# Patient Record
Sex: FEMALE | Race: WHITE | NOT HISPANIC OR LATINO | Employment: UNEMPLOYED | URBAN - METROPOLITAN AREA
[De-identification: names, ages, dates, MRNs, and addresses within clinical notes are randomized per-mention and may not be internally consistent; named-entity substitution may affect disease eponyms.]

---

## 2019-01-01 ENCOUNTER — TELEPHONE (OUTPATIENT)
Dept: ONCOLOGY | Facility: CLINIC | Age: 64
End: 2019-01-01

## 2019-01-01 ENCOUNTER — EDUCATION (OUTPATIENT)
Dept: ONCOLOGY | Facility: HOSPITAL | Age: 64
End: 2019-01-01

## 2019-01-01 ENCOUNTER — OFFICE VISIT (OUTPATIENT)
Dept: ONCOLOGY | Facility: CLINIC | Age: 64
End: 2019-01-01

## 2019-01-01 ENCOUNTER — INFUSION (OUTPATIENT)
Dept: ONCOLOGY | Facility: HOSPITAL | Age: 64
End: 2019-01-01

## 2019-01-01 ENCOUNTER — HOSPITAL ENCOUNTER (OUTPATIENT)
Dept: ONCOLOGY | Facility: HOSPITAL | Age: 64
Discharge: HOME | End: 2019-11-14

## 2019-01-01 ENCOUNTER — SPECIALTY PHARMACY (OUTPATIENT)
Dept: ONCOLOGY | Facility: HOSPITAL | Age: 64
End: 2019-01-01

## 2019-01-01 ENCOUNTER — RESULTS ENCOUNTER (OUTPATIENT)
Dept: ONCOLOGY | Facility: CLINIC | Age: 64
End: 2019-01-01

## 2019-01-01 ENCOUNTER — HOSPITAL ENCOUNTER (OUTPATIENT)
Dept: CARDIOLOGY | Facility: HOSPITAL | Age: 64
Discharge: HOME | End: 2019-12-11
Admitting: NURSE PRACTITIONER

## 2019-01-01 ENCOUNTER — TELEPHONE (OUTPATIENT)
Dept: NUTRITION | Facility: HOSPITAL | Age: 64
End: 2019-01-01

## 2019-01-01 ENCOUNTER — HOSPITAL ENCOUNTER (OUTPATIENT)
Dept: ONCOLOGY | Facility: HOSPITAL | Age: 64
Discharge: HOME | End: 2019-11-01
Admitting: INTERNAL MEDICINE

## 2019-01-01 ENCOUNTER — SPECIALTY PHARMACY (OUTPATIENT)
Dept: ONCOLOGY | Facility: CLINIC | Age: 64
End: 2019-01-01

## 2019-01-01 ENCOUNTER — TELEPHONE (OUTPATIENT)
Dept: ONCOLOGY | Facility: HOSPITAL | Age: 64
End: 2019-01-01

## 2019-01-01 ENCOUNTER — HOSPITAL ENCOUNTER (OUTPATIENT)
Dept: CARDIOLOGY | Facility: HOSPITAL | Age: 64
Discharge: HOME | End: 2019-11-01

## 2019-01-01 ENCOUNTER — HOSPITAL ENCOUNTER (OUTPATIENT)
Dept: ONCOLOGY | Facility: HOSPITAL | Age: 64
Setting detail: INFUSION SERIES
Discharge: HOME | End: 2019-11-14

## 2019-01-01 ENCOUNTER — APPOINTMENT (OUTPATIENT)
Dept: ONCOLOGY | Facility: HOSPITAL | Age: 64
End: 2019-01-01

## 2019-01-01 ENCOUNTER — LAB (OUTPATIENT)
Dept: ONCOLOGY | Facility: HOSPITAL | Age: 64
End: 2019-01-01

## 2019-01-01 ENCOUNTER — TELEPHONE (OUTPATIENT)
Dept: SOCIAL WORK | Facility: HOSPITAL | Age: 64
End: 2019-01-01

## 2019-01-01 ENCOUNTER — DOCUMENTATION (OUTPATIENT)
Dept: NUTRITION | Facility: HOSPITAL | Age: 64
End: 2019-01-01

## 2019-01-01 VITALS
SYSTOLIC BLOOD PRESSURE: 130 MMHG | BODY MASS INDEX: 23.23 KG/M2 | RESPIRATION RATE: 16 BRPM | DIASTOLIC BLOOD PRESSURE: 68 MMHG | TEMPERATURE: 98 F | HEART RATE: 90 BPM | WEIGHT: 145 LBS

## 2019-01-01 VITALS
TEMPERATURE: 98.6 F | WEIGHT: 148 LBS | SYSTOLIC BLOOD PRESSURE: 126 MMHG | DIASTOLIC BLOOD PRESSURE: 62 MMHG | HEIGHT: 66 IN | RESPIRATION RATE: 16 BRPM | HEART RATE: 74 BPM | BODY MASS INDEX: 23.78 KG/M2

## 2019-01-01 VITALS
BODY MASS INDEX: 22.93 KG/M2 | TEMPERATURE: 97.2 F | HEART RATE: 89 BPM | WEIGHT: 143.2 LBS | SYSTOLIC BLOOD PRESSURE: 138 MMHG | DIASTOLIC BLOOD PRESSURE: 89 MMHG | RESPIRATION RATE: 18 BRPM

## 2019-01-01 VITALS — WEIGHT: 143.08 LBS | BODY MASS INDEX: 22.99 KG/M2 | HEIGHT: 66 IN

## 2019-01-01 VITALS
BODY MASS INDEX: 23.24 KG/M2 | RESPIRATION RATE: 16 BRPM | HEART RATE: 103 BPM | SYSTOLIC BLOOD PRESSURE: 115 MMHG | TEMPERATURE: 97.7 F | DIASTOLIC BLOOD PRESSURE: 74 MMHG | WEIGHT: 144 LBS

## 2019-01-01 VITALS
WEIGHT: 143 LBS | HEIGHT: 66 IN | DIASTOLIC BLOOD PRESSURE: 80 MMHG | RESPIRATION RATE: 20 BRPM | BODY MASS INDEX: 22.98 KG/M2 | TEMPERATURE: 98.6 F | SYSTOLIC BLOOD PRESSURE: 130 MMHG | HEART RATE: 80 BPM

## 2019-01-01 VITALS
BODY MASS INDEX: 22.9 KG/M2 | SYSTOLIC BLOOD PRESSURE: 125 MMHG | HEART RATE: 85 BPM | TEMPERATURE: 98.3 F | DIASTOLIC BLOOD PRESSURE: 75 MMHG | RESPIRATION RATE: 18 BRPM | WEIGHT: 143 LBS

## 2019-01-01 VITALS
SYSTOLIC BLOOD PRESSURE: 138 MMHG | TEMPERATURE: 98 F | HEART RATE: 84 BPM | BODY MASS INDEX: 23.48 KG/M2 | WEIGHT: 146.6 LBS | DIASTOLIC BLOOD PRESSURE: 77 MMHG

## 2019-01-01 VITALS
DIASTOLIC BLOOD PRESSURE: 59 MMHG | HEART RATE: 80 BPM | BODY MASS INDEX: 23.3 KG/M2 | WEIGHT: 145 LBS | SYSTOLIC BLOOD PRESSURE: 130 MMHG | HEIGHT: 66 IN | RESPIRATION RATE: 18 BRPM | TEMPERATURE: 98.6 F

## 2019-01-01 VITALS
TEMPERATURE: 98.2 F | BODY MASS INDEX: 23.24 KG/M2 | DIASTOLIC BLOOD PRESSURE: 68 MMHG | RESPIRATION RATE: 18 BRPM | SYSTOLIC BLOOD PRESSURE: 131 MMHG | WEIGHT: 144 LBS | HEART RATE: 88 BPM

## 2019-01-01 VITALS
WEIGHT: 143.2 LBS | TEMPERATURE: 97.9 F | SYSTOLIC BLOOD PRESSURE: 130 MMHG | HEART RATE: 100 BPM | DIASTOLIC BLOOD PRESSURE: 66 MMHG | RESPIRATION RATE: 16 BRPM | BODY MASS INDEX: 23.11 KG/M2

## 2019-01-01 VITALS
SYSTOLIC BLOOD PRESSURE: 134 MMHG | RESPIRATION RATE: 16 BRPM | BODY MASS INDEX: 23.22 KG/M2 | HEART RATE: 103 BPM | DIASTOLIC BLOOD PRESSURE: 78 MMHG | WEIGHT: 145 LBS | TEMPERATURE: 98.8 F

## 2019-01-01 VITALS
RESPIRATION RATE: 16 BRPM | DIASTOLIC BLOOD PRESSURE: 75 MMHG | WEIGHT: 145.4 LBS | TEMPERATURE: 98.4 F | HEART RATE: 79 BPM | SYSTOLIC BLOOD PRESSURE: 141 MMHG | BODY MASS INDEX: 23.47 KG/M2

## 2019-01-01 VITALS
WEIGHT: 142.6 LBS | RESPIRATION RATE: 16 BRPM | TEMPERATURE: 98.1 F | HEART RATE: 102 BPM | DIASTOLIC BLOOD PRESSURE: 64 MMHG | BODY MASS INDEX: 23.02 KG/M2 | SYSTOLIC BLOOD PRESSURE: 123 MMHG

## 2019-01-01 VITALS
SYSTOLIC BLOOD PRESSURE: 173 MMHG | RESPIRATION RATE: 18 BRPM | HEART RATE: 83 BPM | DIASTOLIC BLOOD PRESSURE: 84 MMHG | BODY MASS INDEX: 22.66 KG/M2 | WEIGHT: 141 LBS | HEIGHT: 66 IN | TEMPERATURE: 98.9 F

## 2019-01-01 VITALS
WEIGHT: 149.4 LBS | TEMPERATURE: 97.7 F | DIASTOLIC BLOOD PRESSURE: 79 MMHG | SYSTOLIC BLOOD PRESSURE: 136 MMHG | WEIGHT: 146.8 LBS | BODY MASS INDEX: 23.93 KG/M2 | TEMPERATURE: 97.9 F | RESPIRATION RATE: 18 BRPM | DIASTOLIC BLOOD PRESSURE: 66 MMHG | SYSTOLIC BLOOD PRESSURE: 146 MMHG | RESPIRATION RATE: 18 BRPM | BODY MASS INDEX: 23.52 KG/M2 | HEART RATE: 72 BPM | HEART RATE: 83 BPM

## 2019-01-01 VITALS
WEIGHT: 143.96 LBS | HEART RATE: 82 BPM | TEMPERATURE: 98 F | SYSTOLIC BLOOD PRESSURE: 121 MMHG | DIASTOLIC BLOOD PRESSURE: 56 MMHG | BODY MASS INDEX: 23.05 KG/M2 | RESPIRATION RATE: 18 BRPM

## 2019-01-01 VITALS
DIASTOLIC BLOOD PRESSURE: 76 MMHG | HEIGHT: 66 IN | WEIGHT: 147 LBS | RESPIRATION RATE: 18 BRPM | BODY MASS INDEX: 23.63 KG/M2 | SYSTOLIC BLOOD PRESSURE: 139 MMHG | HEART RATE: 92 BPM | TEMPERATURE: 98.5 F

## 2019-01-01 VITALS
RESPIRATION RATE: 18 BRPM | HEIGHT: 66 IN | DIASTOLIC BLOOD PRESSURE: 80 MMHG | WEIGHT: 148 LBS | BODY MASS INDEX: 23.78 KG/M2 | SYSTOLIC BLOOD PRESSURE: 178 MMHG | TEMPERATURE: 98.8 F | HEART RATE: 126 BPM

## 2019-01-01 VITALS
HEIGHT: 66 IN | SYSTOLIC BLOOD PRESSURE: 120 MMHG | HEART RATE: 80 BPM | OXYGEN SATURATION: 97 % | BODY MASS INDEX: 23.95 KG/M2 | TEMPERATURE: 99.2 F | RESPIRATION RATE: 16 BRPM | DIASTOLIC BLOOD PRESSURE: 74 MMHG | WEIGHT: 149 LBS

## 2019-01-01 VITALS
HEART RATE: 94 BPM | TEMPERATURE: 98 F | RESPIRATION RATE: 16 BRPM | DIASTOLIC BLOOD PRESSURE: 82 MMHG | BODY MASS INDEX: 23.55 KG/M2 | SYSTOLIC BLOOD PRESSURE: 145 MMHG | WEIGHT: 147 LBS

## 2019-01-01 VITALS
DIASTOLIC BLOOD PRESSURE: 66 MMHG | WEIGHT: 147 LBS | RESPIRATION RATE: 18 BRPM | TEMPERATURE: 97.9 F | HEIGHT: 66 IN | BODY MASS INDEX: 23.63 KG/M2 | HEART RATE: 73 BPM | SYSTOLIC BLOOD PRESSURE: 127 MMHG

## 2019-01-01 VITALS
SYSTOLIC BLOOD PRESSURE: 110 MMHG | RESPIRATION RATE: 18 BRPM | HEART RATE: 112 BPM | BODY MASS INDEX: 23.22 KG/M2 | WEIGHT: 145 LBS | TEMPERATURE: 98.8 F | DIASTOLIC BLOOD PRESSURE: 65 MMHG

## 2019-01-01 VITALS
WEIGHT: 144 LBS | SYSTOLIC BLOOD PRESSURE: 138 MMHG | TEMPERATURE: 98.1 F | HEART RATE: 85 BPM | DIASTOLIC BLOOD PRESSURE: 79 MMHG | HEIGHT: 66 IN | RESPIRATION RATE: 18 BRPM | BODY MASS INDEX: 23.14 KG/M2

## 2019-01-01 VITALS
RESPIRATION RATE: 18 BRPM | HEART RATE: 84 BPM | BODY MASS INDEX: 23.16 KG/M2 | DIASTOLIC BLOOD PRESSURE: 88 MMHG | WEIGHT: 144.6 LBS | SYSTOLIC BLOOD PRESSURE: 156 MMHG | TEMPERATURE: 98.3 F

## 2019-01-01 VITALS — BODY MASS INDEX: 23.3 KG/M2 | HEIGHT: 66 IN | WEIGHT: 145 LBS

## 2019-01-01 VITALS
HEART RATE: 80 BPM | BODY MASS INDEX: 23.15 KG/M2 | DIASTOLIC BLOOD PRESSURE: 46 MMHG | RESPIRATION RATE: 18 BRPM | WEIGHT: 143.4 LBS | SYSTOLIC BLOOD PRESSURE: 130 MMHG | TEMPERATURE: 98.9 F

## 2019-01-01 DIAGNOSIS — C18.8 OVERLAPPING MALIGNANT NEOPLASM OF COLON (HCC): Primary | ICD-10-CM

## 2019-01-01 DIAGNOSIS — C18.8 OVERLAPPING MALIGNANT NEOPLASM OF COLON (HCC): ICD-10-CM

## 2019-01-01 DIAGNOSIS — Z45.2 ENCOUNTER FOR VENOUS ACCESS DEVICE CARE: ICD-10-CM

## 2019-01-01 DIAGNOSIS — C18.8 OVERLAPPING MALIGNANT NEOPLASM OF COLON (HCC): Primary | Chronic | ICD-10-CM

## 2019-01-01 DIAGNOSIS — C18.8 OVERLAPPING MALIGNANT NEOPLASM OF COLON (HCC): Chronic | ICD-10-CM

## 2019-01-01 DIAGNOSIS — Z51.11 ENCOUNTER FOR ANTINEOPLASTIC CHEMOTHERAPY: Primary | ICD-10-CM

## 2019-01-01 DIAGNOSIS — Z45.2 ENCOUNTER FOR VENOUS ACCESS DEVICE CARE: Primary | ICD-10-CM

## 2019-01-01 DIAGNOSIS — Z51.11 ENCOUNTER FOR ANTINEOPLASTIC CHEMOTHERAPY: ICD-10-CM

## 2019-01-01 DIAGNOSIS — C78.6 PERITONEAL CARCINOMATOSIS (HCC): ICD-10-CM

## 2019-01-01 DIAGNOSIS — C78.6 PERITONEAL CARCINOMATOSIS (HCC): Primary | ICD-10-CM

## 2019-01-01 LAB
ALBUMIN SERPL-MCNC: 3.6 G/DL (ref 3.6–4.8)
ALBUMIN SERPL-MCNC: 3.6 G/DL (ref 3.6–4.8)
ALBUMIN SERPL-MCNC: 3.7 G/DL (ref 3.6–4.8)
ALBUMIN SERPL-MCNC: 3.7 G/DL (ref 3.6–4.8)
ALBUMIN SERPL-MCNC: 3.8 G/DL (ref 3.6–4.8)
ALBUMIN SERPL-MCNC: 3.9 G/DL (ref 3.6–4.8)
ALBUMIN SERPL-MCNC: 4 G/DL (ref 3.5–5.2)
ALBUMIN SERPL-MCNC: 4 G/DL (ref 3.6–4.8)
ALBUMIN SERPL-MCNC: 4.1 G/DL (ref 3.5–5.2)
ALBUMIN SERPL-MCNC: 4.1 G/DL (ref 3.6–4.8)
ALBUMIN SERPL-MCNC: 4.1 G/DL (ref 3.6–4.8)
ALBUMIN SERPL-MCNC: NORMAL G/DL
ALBUMIN/GLOB SERPL: 1.3 G/DL
ALBUMIN/GLOB SERPL: 1.4 {RATIO} (ref 1.2–2.2)
ALBUMIN/GLOB SERPL: 1.5 {RATIO} (ref 1.2–2.2)
ALBUMIN/GLOB SERPL: 1.6 {RATIO} (ref 1.2–2.2)
ALBUMIN/GLOB SERPL: 1.6 {RATIO} (ref 1.2–2.2)
ALBUMIN/GLOB SERPL: 1.7 G/DL
ALBUMIN/GLOB SERPL: 1.7 {RATIO} (ref 1.2–2.2)
ALBUMIN/GLOB SERPL: 1.8 {RATIO} (ref 1.2–2.2)
ALBUMIN/GLOB SERPL: 2.1 {RATIO} (ref 1.2–2.2)
ALP SERPL-CCNC: 102 IU/L (ref 39–117)
ALP SERPL-CCNC: 105 IU/L (ref 39–117)
ALP SERPL-CCNC: 108 IU/L (ref 39–117)
ALP SERPL-CCNC: 116 IU/L (ref 39–117)
ALP SERPL-CCNC: 116 IU/L (ref 39–117)
ALP SERPL-CCNC: 121 IU/L (ref 39–117)
ALP SERPL-CCNC: 123 IU/L (ref 39–117)
ALP SERPL-CCNC: 145 U/L (ref 39–117)
ALP SERPL-CCNC: 148 IU/L (ref 39–117)
ALP SERPL-CCNC: 153 U/L (ref 39–117)
ALP SERPL-CCNC: 98 IU/L (ref 39–117)
ALP SERPL-CCNC: NORMAL U/L
ALT SERPL W P-5'-P-CCNC: 40 U/L (ref 1–33)
ALT SERPL-CCNC: 17 IU/L (ref 0–32)
ALT SERPL-CCNC: 20 IU/L (ref 0–32)
ALT SERPL-CCNC: 20 IU/L (ref 0–32)
ALT SERPL-CCNC: 27 IU/L (ref 0–32)
ALT SERPL-CCNC: 27 IU/L (ref 0–32)
ALT SERPL-CCNC: 28 IU/L (ref 0–32)
ALT SERPL-CCNC: 31 IU/L (ref 0–32)
ALT SERPL-CCNC: 36 IU/L (ref 0–32)
ALT SERPL-CCNC: 44 IU/L (ref 0–32)
ALT SERPL-CCNC: 47 U/L (ref 1–33)
ALT SERPL-CCNC: NORMAL U/L
ANION GAP SERPL CALCULATED.3IONS-SCNC: 11 MMOL/L (ref 5–15)
APPEARANCE UR: ABNORMAL
APPEARANCE UR: CLEAR
ASCENDING AORTA: 2.9 CM
AST SERPL-CCNC: 17 IU/L (ref 0–40)
AST SERPL-CCNC: 20 IU/L (ref 0–40)
AST SERPL-CCNC: 21 IU/L (ref 0–40)
AST SERPL-CCNC: 25 IU/L (ref 0–40)
AST SERPL-CCNC: 26 IU/L (ref 0–40)
AST SERPL-CCNC: 29 U/L (ref 1–32)
AST SERPL-CCNC: 31 IU/L (ref 0–40)
AST SERPL-CCNC: 33 IU/L (ref 0–40)
AST SERPL-CCNC: 35 U/L (ref 1–32)
AST SERPL-CCNC: NORMAL U/L
BASOPHILS # BLD AUTO: 0 X10E3/UL (ref 0–0.2)
BASOPHILS # BLD AUTO: 0.05 10*3/MM3 (ref 0–0.2)
BASOPHILS # BLD AUTO: 0.1 X10E3/UL (ref 0–0.2)
BASOPHILS # BLD AUTO: 0.1 X10E3/UL (ref 0–0.2)
BASOPHILS NFR BLD AUTO: 0 %
BASOPHILS NFR BLD AUTO: 0 %
BASOPHILS NFR BLD AUTO: 0.8 % (ref 0–1.5)
BASOPHILS NFR BLD AUTO: 1 %
BH CV ECHO MEAS - AO MAX PG (FULL): 1.5 MMHG
BH CV ECHO MEAS - AO MAX PG: 4.9 MMHG
BH CV ECHO MEAS - AO MEAN PG (FULL): 0.95 MMHG
BH CV ECHO MEAS - AO MEAN PG: 2.6 MMHG
BH CV ECHO MEAS - AO ROOT AREA (BSA CORRECTED): 1.6
BH CV ECHO MEAS - AO ROOT AREA: 6.1 CM^2
BH CV ECHO MEAS - AO ROOT DIAM: 2.8 CM
BH CV ECHO MEAS - AO V2 MAX: 110.8 CM/SEC
BH CV ECHO MEAS - AO V2 MEAN: 76.4 CM/SEC
BH CV ECHO MEAS - AO V2 VTI: 22.9 CM
BH CV ECHO MEAS - ASC AORTA: 2.9 CM
BH CV ECHO MEAS - AVA(I,A): 2.5 CM^2
BH CV ECHO MEAS - AVA(I,D): 2.5 CM^2
BH CV ECHO MEAS - AVA(V,A): 2.5 CM^2
BH CV ECHO MEAS - AVA(V,D): 2.5 CM^2
BH CV ECHO MEAS - BSA(HAYCOCK): 1.7 M^2
BH CV ECHO MEAS - BSA(HAYCOCK): 1.8 M^2
BH CV ECHO MEAS - BSA: 1.7 M^2
BH CV ECHO MEAS - BSA: 1.7 M^2
BH CV ECHO MEAS - BZI_BMI: 23.1 KILOGRAMS/M^2
BH CV ECHO MEAS - BZI_BMI: 23.4 KILOGRAMS/M^2
BH CV ECHO MEAS - BZI_METRIC_HEIGHT: 167.6 CM
BH CV ECHO MEAS - BZI_METRIC_HEIGHT: 167.6 CM
BH CV ECHO MEAS - BZI_METRIC_WEIGHT: 64.9 KG
BH CV ECHO MEAS - BZI_METRIC_WEIGHT: 65.8 KG
BH CV ECHO MEAS - EDV(CUBED): 103.2 ML
BH CV ECHO MEAS - EDV(CUBED): 139 ML
BH CV ECHO MEAS - EDV(MOD-SP2): 46 ML
BH CV ECHO MEAS - EDV(MOD-SP2): 70 ML
BH CV ECHO MEAS - EDV(MOD-SP4): 110 ML
BH CV ECHO MEAS - EDV(MOD-SP4): 79 ML
BH CV ECHO MEAS - EDV(TEICH): 101.9 ML
BH CV ECHO MEAS - EDV(TEICH): 128.3 ML
BH CV ECHO MEAS - EF(CUBED): 60.4 %
BH CV ECHO MEAS - EF(CUBED): 62.9 %
BH CV ECHO MEAS - EF(MOD-BP): 59 %
BH CV ECHO MEAS - EF(MOD-BP): 72 %
BH CV ECHO MEAS - EF(MOD-SP2): 55.7 %
BH CV ECHO MEAS - EF(MOD-SP2): 71.7 %
BH CV ECHO MEAS - EF(MOD-SP4): 60.9 %
BH CV ECHO MEAS - EF(MOD-SP4): 73.4 %
BH CV ECHO MEAS - EF(TEICH): 52 %
BH CV ECHO MEAS - EF(TEICH): 54 %
BH CV ECHO MEAS - ESV(CUBED): 40.9 ML
BH CV ECHO MEAS - ESV(CUBED): 51.6 ML
BH CV ECHO MEAS - ESV(MOD-SP2): 13 ML
BH CV ECHO MEAS - ESV(MOD-SP2): 31 ML
BH CV ECHO MEAS - ESV(MOD-SP4): 21 ML
BH CV ECHO MEAS - ESV(MOD-SP4): 43 ML
BH CV ECHO MEAS - ESV(TEICH): 48.9 ML
BH CV ECHO MEAS - ESV(TEICH): 59 ML
BH CV ECHO MEAS - FS: 26.6 %
BH CV ECHO MEAS - FS: 28.1 %
BH CV ECHO MEAS - IVS/LVPW: 0.99
BH CV ECHO MEAS - IVS/LVPW: 1.3
BH CV ECHO MEAS - IVSD: 0.72 CM
BH CV ECHO MEAS - IVSD: 0.89 CM
BH CV ECHO MEAS - LA DIMENSION: 3.1 CM
BH CV ECHO MEAS - LA/AO: 1.1
BH CV ECHO MEAS - LAD MAJOR: 4.3 CM
BH CV ECHO MEAS - LAT PEAK E' VEL: 9.5 CM/SEC
BH CV ECHO MEAS - LATERAL E/E' RATIO: 10.8
BH CV ECHO MEAS - LV DIASTOLIC VOL/BSA (35-75): 45.3 ML/M^2
BH CV ECHO MEAS - LV DIASTOLIC VOL/BSA (35-75): 63.4 ML/M^2
BH CV ECHO MEAS - LV MASS(C)D: 109.9 GRAMS
BH CV ECHO MEAS - LV MASS(C)D: 140.7 GRAMS
BH CV ECHO MEAS - LV MASS(C)DI: 63.3 GRAMS/M^2
BH CV ECHO MEAS - LV MASS(C)DI: 80.7 GRAMS/M^2
BH CV ECHO MEAS - LV MAX PG: 3.4 MMHG
BH CV ECHO MEAS - LV MEAN PG: 1.7 MMHG
BH CV ECHO MEAS - LV SYSTOLIC VOL/BSA (12-30): 12 ML/M^2
BH CV ECHO MEAS - LV SYSTOLIC VOL/BSA (12-30): 24.8 ML/M^2
BH CV ECHO MEAS - LV V1 MAX: 92.1 CM/SEC
BH CV ECHO MEAS - LV V1 MEAN: 59.9 CM/SEC
BH CV ECHO MEAS - LV V1 VTI: 18.6 CM
BH CV ECHO MEAS - LVIDD: 4.7 CM
BH CV ECHO MEAS - LVIDD: 5.2 CM
BH CV ECHO MEAS - LVIDS: 3.4 CM
BH CV ECHO MEAS - LVIDS: 3.7 CM
BH CV ECHO MEAS - LVLD AP2: 6.3 CM
BH CV ECHO MEAS - LVLD AP2: 6.8 CM
BH CV ECHO MEAS - LVLD AP4: 6.6 CM
BH CV ECHO MEAS - LVLD AP4: 7.6 CM
BH CV ECHO MEAS - LVLS AP2: 5.2 CM
BH CV ECHO MEAS - LVLS AP2: 5.5 CM
BH CV ECHO MEAS - LVLS AP4: 5.3 CM
BH CV ECHO MEAS - LVLS AP4: 5.8 CM
BH CV ECHO MEAS - LVOT AREA (M): 3.1 CM^2
BH CV ECHO MEAS - LVOT AREA: 3.1 CM^2
BH CV ECHO MEAS - LVOT DIAM: 2 CM
BH CV ECHO MEAS - LVPWD: 0.57 CM
BH CV ECHO MEAS - LVPWD: 0.9 CM
BH CV ECHO MEAS - MED PEAK E' VEL: 4.3 CM/SEC
BH CV ECHO MEAS - MEDIAL E/E' RATIO: 23.5
BH CV ECHO MEAS - MV A MAX VEL: 96 CM/SEC
BH CV ECHO MEAS - MV DEC TIME: 0.23 SEC
BH CV ECHO MEAS - MV E MAX VEL: 104.7 CM/SEC
BH CV ECHO MEAS - MV E/A: 1.1
BH CV ECHO MEAS - MV MAX PG: 4.9 MMHG
BH CV ECHO MEAS - MV MEAN PG: 2.1 MMHG
BH CV ECHO MEAS - MV V2 MAX: 111.2 CM/SEC
BH CV ECHO MEAS - MV V2 MEAN: 65.9 CM/SEC
BH CV ECHO MEAS - MV V2 VTI: 29.7 CM
BH CV ECHO MEAS - MVA(VTI): 1.9 CM^2
BH CV ECHO MEAS - PA ACC SLOPE: 570.7 CM/SEC^2
BH CV ECHO MEAS - PA ACC TIME: 0.15 SEC
BH CV ECHO MEAS - PA MAX PG: 3.5 MMHG
BH CV ECHO MEAS - PA MEAN PG: 2.1 MMHG
BH CV ECHO MEAS - PA PR(ACCEL): 12.5 MMHG
BH CV ECHO MEAS - PA V2 MAX: 93.1 CM/SEC
BH CV ECHO MEAS - PA V2 MEAN: 68.1 CM/SEC
BH CV ECHO MEAS - PA V2 VTI: 17.7 CM
BH CV ECHO MEAS - RAP SYSTOLE: 3 MMHG
BH CV ECHO MEAS - RVSP: 18 MMHG
BH CV ECHO MEAS - SI(AO): 80.5 ML/M^2
BH CV ECHO MEAS - SI(CUBED): 35.7 ML/M^2
BH CV ECHO MEAS - SI(CUBED): 50.4 ML/M^2
BH CV ECHO MEAS - SI(LVOT): 32.6 ML/M^2
BH CV ECHO MEAS - SI(MOD-SP2): 18.9 ML/M^2
BH CV ECHO MEAS - SI(MOD-SP2): 22.5 ML/M^2
BH CV ECHO MEAS - SI(MOD-SP4): 33.2 ML/M^2
BH CV ECHO MEAS - SI(MOD-SP4): 38.6 ML/M^2
BH CV ECHO MEAS - SI(TEICH): 30.4 ML/M^2
BH CV ECHO MEAS - SI(TEICH): 40 ML/M^2
BH CV ECHO MEAS - SV(AO): 139.7 ML
BH CV ECHO MEAS - SV(CUBED): 62.4 ML
BH CV ECHO MEAS - SV(CUBED): 87.4 ML
BH CV ECHO MEAS - SV(LVOT): 56.6 ML
BH CV ECHO MEAS - SV(MOD-SP2): 33 ML
BH CV ECHO MEAS - SV(MOD-SP2): 39 ML
BH CV ECHO MEAS - SV(MOD-SP4): 58 ML
BH CV ECHO MEAS - SV(MOD-SP4): 67 ML
BH CV ECHO MEAS - SV(TEICH): 53 ML
BH CV ECHO MEAS - SV(TEICH): 69.4 ML
BH CV ECHO MEAS - TAPSE (>1.6): 2.5 CM2
BH CV ECHO MEAS - TR MAX PG: 15 MMHG
BH CV ECHO MEAS - TR MAX VEL: 189.4 CM/SEC
BH CV ECHO MEASUREMENTS AVERAGE E/E' RATIO: 15.17
BH CV VAS BP RIGHT ARM: NORMAL MMHG
BH CV XLRA - RV BASE: 3.4 CM
BH CV XLRA - RV LENGTH: 6.9 CM
BH CV XLRA - RV MID: 2.2 CM
BH CV XLRA - TDI S': 13.8 CM/SEC
BILIRUB SERPL-MCNC: 0.2 MG/DL (ref 0.2–1.2)
BILIRUB SERPL-MCNC: 0.2 MG/DL (ref 0–1.2)
BILIRUB SERPL-MCNC: 0.3 MG/DL (ref 0–1.2)
BILIRUB SERPL-MCNC: 0.3 MG/DL (ref 0–1.2)
BILIRUB SERPL-MCNC: <0.2 MG/DL (ref 0.2–1.2)
BILIRUB SERPL-MCNC: <0.2 MG/DL (ref 0–1.2)
BILIRUB SERPL-MCNC: NORMAL MG/DL
BILIRUB UR QL STRIP: NEGATIVE
BUN BLD-MCNC: 12 MG/DL (ref 8–23)
BUN SERPL-MCNC: 10 MG/DL (ref 8–27)
BUN SERPL-MCNC: 12 MG/DL (ref 8–27)
BUN SERPL-MCNC: 12 MG/DL (ref 8–27)
BUN SERPL-MCNC: 13 MG/DL (ref 8–27)
BUN SERPL-MCNC: 14 MG/DL (ref 8–23)
BUN SERPL-MCNC: 14 MG/DL (ref 8–27)
BUN SERPL-MCNC: 17 MG/DL (ref 8–27)
BUN SERPL-MCNC: 9 MG/DL (ref 8–27)
BUN SERPL-MCNC: NORMAL MG/DL
BUN/CREAT SERPL: 16 (ref 12–28)
BUN/CREAT SERPL: 18 (ref 12–28)
BUN/CREAT SERPL: 18 (ref 12–28)
BUN/CREAT SERPL: 18.8 (ref 7–25)
BUN/CREAT SERPL: 19 (ref 12–28)
BUN/CREAT SERPL: 19 (ref 12–28)
BUN/CREAT SERPL: 21 (ref 12–28)
BUN/CREAT SERPL: 21 (ref 12–28)
BUN/CREAT SERPL: 22.2 (ref 7–25)
BUN/CREAT SERPL: 25 (ref 12–28)
BUN/CREAT SERPL: 26 (ref 12–28)
BUN/CREAT SERPL: 30 (ref 12–28)
CALCIUM SERPL-MCNC: 9 MG/DL (ref 8.7–10.3)
CALCIUM SERPL-MCNC: 9.1 MG/DL (ref 8.7–10.3)
CALCIUM SERPL-MCNC: 9.1 MG/DL (ref 8.7–10.3)
CALCIUM SERPL-MCNC: 9.3 MG/DL (ref 8.6–10.5)
CALCIUM SERPL-MCNC: 9.3 MG/DL (ref 8.7–10.3)
CALCIUM SERPL-MCNC: 9.4 MG/DL (ref 8.7–10.3)
CALCIUM SERPL-MCNC: 9.6 MG/DL (ref 8.7–10.3)
CALCIUM SERPL-MCNC: NORMAL MG/DL
CALCIUM SPEC-SCNC: 9.6 MG/DL (ref 8.6–10.5)
CEA SERPL-MCNC: 14.5 NG/ML (ref 0–4.7)
CEA SERPL-MCNC: 3.4 NG/ML (ref 0–4.7)
CEA SERPL-MCNC: 4.9 NG/ML (ref 0–4.7)
CEA SERPL-MCNC: 6.8 NG/ML (ref 0–4.7)
CEA SERPL-MCNC: 8.4 NG/ML (ref 0–4.7)
CHLORIDE SERPL-SCNC: 100 MMOL/L (ref 96–106)
CHLORIDE SERPL-SCNC: 101 MMOL/L (ref 96–106)
CHLORIDE SERPL-SCNC: 102 MMOL/L (ref 98–107)
CHLORIDE SERPL-SCNC: 103 MMOL/L (ref 96–106)
CHLORIDE SERPL-SCNC: 103 MMOL/L (ref 98–107)
CHLORIDE SERPL-SCNC: 104 MMOL/L (ref 96–106)
CHLORIDE SERPL-SCNC: 105 MMOL/L (ref 96–106)
CHLORIDE SERPL-SCNC: 106 MMOL/L (ref 96–106)
CHLORIDE SERPL-SCNC: 107 MMOL/L (ref 96–106)
CHLORIDE SERPL-SCNC: 107 MMOL/L (ref 96–106)
CHLORIDE SERPL-SCNC: NORMAL MMOL/L
CK SERPL-CCNC: 111 U/L (ref 24–173)
CK SERPL-CCNC: 53 U/L (ref 20–180)
CO2 SERPL-SCNC: 18 MMOL/L (ref 20–29)
CO2 SERPL-SCNC: 20 MMOL/L (ref 20–29)
CO2 SERPL-SCNC: 21 MMOL/L (ref 20–29)
CO2 SERPL-SCNC: 22 MMOL/L (ref 20–29)
CO2 SERPL-SCNC: 22 MMOL/L (ref 20–29)
CO2 SERPL-SCNC: 23 MMOL/L (ref 20–29)
CO2 SERPL-SCNC: 23 MMOL/L (ref 20–29)
CO2 SERPL-SCNC: 25 MMOL/L (ref 20–29)
CO2 SERPL-SCNC: 25.3 MMOL/L (ref 22–29)
CO2 SERPL-SCNC: 27 MMOL/L (ref 22–29)
CO2 SERPL-SCNC: NORMAL MMOL/L
COLOR UR: YELLOW
CONV .: NORMAL
CONV .: NORMAL
CREAT BLD-MCNC: 0.64 MG/DL (ref 0.57–1)
CREAT SERPL-MCNC: 0.5 MG/DL (ref 0.57–1)
CREAT SERPL-MCNC: 0.52 MG/DL (ref 0.57–1)
CREAT SERPL-MCNC: 0.56 MG/DL (ref 0.57–1)
CREAT SERPL-MCNC: 0.57 MG/DL (ref 0.57–1)
CREAT SERPL-MCNC: 0.57 MG/DL (ref 0.57–1)
CREAT SERPL-MCNC: 0.61 MG/DL (ref 0.57–1)
CREAT SERPL-MCNC: 0.63 MG/DL (ref 0.57–1)
CREAT SERPL-MCNC: 0.64 MG/DL (ref 0.57–1)
CREAT SERPL-MCNC: 0.67 MG/DL (ref 0.57–1)
CREAT SERPL-MCNC: 0.68 MG/DL (ref 0.57–1)
CREAT SERPL-MCNC: 0.68 MG/DL (ref 0.57–1)
CREAT SERPL-MCNC: NORMAL MG/DL
EOSINOPHIL # BLD AUTO: 0.2 X10E3/UL (ref 0–0.4)
EOSINOPHIL # BLD AUTO: 0.2 X10E3/UL (ref 0–0.4)
EOSINOPHIL # BLD AUTO: 0.3 X10E3/UL (ref 0–0.4)
EOSINOPHIL # BLD AUTO: 0.3 X10E3/UL (ref 0–0.4)
EOSINOPHIL # BLD AUTO: 0.4 X10E3/UL (ref 0–0.4)
EOSINOPHIL # BLD AUTO: 0.4 X10E3/UL (ref 0–0.4)
EOSINOPHIL # BLD AUTO: 0.44 10*3/MM3 (ref 0–0.4)
EOSINOPHIL # BLD AUTO: 0.5 X10E3/UL (ref 0–0.4)
EOSINOPHIL # BLD AUTO: 1 X10E3/UL (ref 0–0.4)
EOSINOPHIL NFR BLD AUTO: 11 %
EOSINOPHIL NFR BLD AUTO: 12 %
EOSINOPHIL NFR BLD AUTO: 6 %
EOSINOPHIL NFR BLD AUTO: 6 %
EOSINOPHIL NFR BLD AUTO: 7 %
EOSINOPHIL NFR BLD AUTO: 7.2 % (ref 0.3–6.2)
EOSINOPHIL NFR BLD AUTO: 8 %
EOSINOPHIL NFR BLD AUTO: 9 %
EOSINOPHIL NFR BLD AUTO: 9 %
ERYTHROCYTE [DISTWIDTH] IN BLOOD BY AUTOMATED COUNT: 14.2 % (ref 12.3–15.4)
ERYTHROCYTE [DISTWIDTH] IN BLOOD BY AUTOMATED COUNT: 14.8 % (ref 12.3–15.4)
ERYTHROCYTE [DISTWIDTH] IN BLOOD BY AUTOMATED COUNT: 15 % (ref 12.3–15.4)
ERYTHROCYTE [DISTWIDTH] IN BLOOD BY AUTOMATED COUNT: 15.8 % (ref 12.3–15.4)
ERYTHROCYTE [DISTWIDTH] IN BLOOD BY AUTOMATED COUNT: 15.8 % (ref 12.3–15.4)
ERYTHROCYTE [DISTWIDTH] IN BLOOD BY AUTOMATED COUNT: 16.2 % (ref 12.3–15.4)
ERYTHROCYTE [DISTWIDTH] IN BLOOD BY AUTOMATED COUNT: 16.7 % (ref 12.3–15.4)
ERYTHROCYTE [DISTWIDTH] IN BLOOD BY AUTOMATED COUNT: 17.6 % (ref 12.3–15.4)
ERYTHROCYTE [DISTWIDTH] IN BLOOD BY AUTOMATED COUNT: 18.9 % (ref 12.3–15.4)
ERYTHROCYTE [DISTWIDTH] IN BLOOD BY AUTOMATED COUNT: 21.1 % (ref 12.3–15.4)
ERYTHROCYTE [DISTWIDTH] IN BLOOD BY AUTOMATED COUNT: 22.5 % (ref 12.3–15.4)
ERYTHROCYTE [DISTWIDTH] IN BLOOD BY AUTOMATED COUNT: 23 % (ref 12.3–15.4)
GFR SERPL CREATININE-BSD FRML MDRD: 93 ML/MIN/1.73
GLOBULIN SER CALC-MCNC: 2 G/DL (ref 1.5–4.5)
GLOBULIN SER CALC-MCNC: 2.1 G/DL (ref 1.5–4.5)
GLOBULIN SER CALC-MCNC: 2.1 G/DL (ref 1.5–4.5)
GLOBULIN SER CALC-MCNC: 2.2 G/DL (ref 1.5–4.5)
GLOBULIN SER CALC-MCNC: 2.2 G/DL (ref 1.5–4.5)
GLOBULIN SER CALC-MCNC: 2.3 G/DL (ref 1.5–4.5)
GLOBULIN SER CALC-MCNC: 2.3 GM/DL
GLOBULIN SER CALC-MCNC: 2.4 G/DL (ref 1.5–4.5)
GLOBULIN SER CALC-MCNC: 2.5 G/DL (ref 1.5–4.5)
GLOBULIN SER CALC-MCNC: 2.6 G/DL (ref 1.5–4.5)
GLOBULIN UR ELPH-MCNC: 3.1 GM/DL
GLUCOSE BLD-MCNC: 143 MG/DL (ref 65–99)
GLUCOSE SERPL-MCNC: 100 MG/DL (ref 65–99)
GLUCOSE SERPL-MCNC: 103 MG/DL (ref 65–99)
GLUCOSE SERPL-MCNC: 116 MG/DL (ref 65–99)
GLUCOSE SERPL-MCNC: 133 MG/DL (ref 65–99)
GLUCOSE SERPL-MCNC: 166 MG/DL (ref 65–99)
GLUCOSE SERPL-MCNC: 78 MG/DL (ref 65–99)
GLUCOSE SERPL-MCNC: 81 MG/DL (ref 65–99)
GLUCOSE SERPL-MCNC: 83 MG/DL (ref 65–99)
GLUCOSE SERPL-MCNC: 83 MG/DL (ref 65–99)
GLUCOSE SERPL-MCNC: 87 MG/DL (ref 65–99)
GLUCOSE SERPL-MCNC: 88 MG/DL (ref 65–99)
GLUCOSE SERPL-MCNC: NORMAL MG/DL
GLUCOSE UR QL: NEGATIVE
HCT VFR BLD AUTO: 29.7 % (ref 34–46.6)
HCT VFR BLD AUTO: 30.6 % (ref 34–46.6)
HCT VFR BLD AUTO: 32.2 % (ref 34–46.6)
HCT VFR BLD AUTO: 32.3 % (ref 34–46.6)
HCT VFR BLD AUTO: 32.6 % (ref 34–46.6)
HCT VFR BLD AUTO: 32.8 % (ref 34–46.6)
HCT VFR BLD AUTO: 32.8 % (ref 34–46.6)
HCT VFR BLD AUTO: 33.2 % (ref 34–46.6)
HCT VFR BLD AUTO: 33.2 % (ref 34–46.6)
HCT VFR BLD AUTO: 34.9 % (ref 34–46.6)
HCT VFR BLD AUTO: 35.3 % (ref 34–46.6)
HCT VFR BLD AUTO: 36.8 % (ref 34–46.6)
HGB BLD-MCNC: 10.1 G/DL (ref 11.1–15.9)
HGB BLD-MCNC: 10.7 G/DL (ref 11.1–15.9)
HGB BLD-MCNC: 10.8 G/DL (ref 12–15.9)
HGB BLD-MCNC: 10.9 G/DL (ref 11.1–15.9)
HGB BLD-MCNC: 11 G/DL (ref 11.1–15.9)
HGB BLD-MCNC: 11 G/DL (ref 11.1–15.9)
HGB BLD-MCNC: 11 G/DL (ref 12–15.9)
HGB BLD-MCNC: 11.1 G/DL (ref 11.1–15.9)
HGB BLD-MCNC: 11.4 G/DL (ref 11.1–15.9)
HGB BLD-MCNC: 11.4 G/DL (ref 11.1–15.9)
HGB BLD-MCNC: 11.6 G/DL (ref 11.1–15.9)
HGB BLD-MCNC: 9.8 G/DL (ref 11.1–15.9)
HGB UR QL STRIP: NEGATIVE
IMM GRANULOCYTES # BLD AUTO: 0 X10E3/UL (ref 0–0.1)
IMM GRANULOCYTES # BLD AUTO: 0.01 10*3/MM3 (ref 0–0.05)
IMM GRANULOCYTES NFR BLD AUTO: 0 %
IMM GRANULOCYTES NFR BLD AUTO: 0.2 % (ref 0–0.5)
KETONES UR QL STRIP: NEGATIVE
LEFT ATRIUM VOLUME INDEX: 38.1 ML/M^2
LEFT ATRIUM VOLUME: 66 ML
LEUKOCYTE ESTERASE UR QL STRIP: ABNORMAL
LEUKOCYTE ESTERASE UR QL STRIP: NEGATIVE
LYMPHOCYTES # BLD AUTO: 0.3 X10E3/UL (ref 0.7–3.1)
LYMPHOCYTES # BLD AUTO: 0.4 X10E3/UL (ref 0.7–3.1)
LYMPHOCYTES # BLD AUTO: 0.45 10*3/MM3 (ref 0.7–3.1)
LYMPHOCYTES # BLD AUTO: 0.5 X10E3/UL (ref 0.7–3.1)
LYMPHOCYTES # BLD AUTO: 0.6 X10E3/UL (ref 0.7–3.1)
LYMPHOCYTES # BLD AUTO: 0.7 10*3/MM3 (ref 0.7–3.1)
LYMPHOCYTES # BLD AUTO: 0.9 X10E3/UL (ref 0.7–3.1)
LYMPHOCYTES NFR BLD AUTO: 11 %
LYMPHOCYTES NFR BLD AUTO: 11 %
LYMPHOCYTES NFR BLD AUTO: 13.8 % (ref 19.6–45.3)
LYMPHOCYTES NFR BLD AUTO: 17 %
LYMPHOCYTES NFR BLD AUTO: 17 %
LYMPHOCYTES NFR BLD AUTO: 5 %
LYMPHOCYTES NFR BLD AUTO: 7.3 % (ref 19.6–45.3)
LYMPHOCYTES NFR BLD AUTO: 8 %
LYMPHOCYTES NFR BLD AUTO: 9 %
Lab: NORMAL
Lab: NORMAL
MAGNESIUM SERPL-MCNC: 1.9 MG/DL (ref 1.6–2.3)
MAGNESIUM SERPL-MCNC: 2 MG/DL (ref 1.6–2.3)
MAGNESIUM SERPL-MCNC: 2 MG/DL (ref 1.6–2.4)
MAGNESIUM SERPL-MCNC: 2.1 MG/DL (ref 1.6–2.4)
MAXIMAL PREDICTED HEART RATE: 154 BPM
MAXIMAL PREDICTED HEART RATE: 154 BPM
MCH RBC QN AUTO: 29.8 PG (ref 26.6–33)
MCH RBC QN AUTO: 30.4 PG (ref 26.6–33)
MCH RBC QN AUTO: 30.4 PG (ref 26.6–33)
MCH RBC QN AUTO: 31 PG (ref 26.6–33)
MCH RBC QN AUTO: 31.6 PG (ref 26.6–33)
MCH RBC QN AUTO: 32.4 PG (ref 26.6–33)
MCH RBC QN AUTO: 32.7 PG (ref 26.6–33)
MCH RBC QN AUTO: 33 PG (ref 26.6–33)
MCH RBC QN AUTO: 33.1 PG (ref 26.6–33)
MCH RBC QN AUTO: 35.7 PG (ref 26.6–33)
MCHC RBC AUTO-ENTMCNC: 31 G/DL (ref 31.5–35.7)
MCHC RBC AUTO-ENTMCNC: 32.3 G/DL (ref 31.5–35.7)
MCHC RBC AUTO-ENTMCNC: 33 G/DL (ref 31.5–35.7)
MCHC RBC AUTO-ENTMCNC: 33 G/DL (ref 31.5–35.7)
MCHC RBC AUTO-ENTMCNC: 33.1 G/DL (ref 31.5–35.7)
MCHC RBC AUTO-ENTMCNC: 33.2 G/DL (ref 31.5–35.7)
MCHC RBC AUTO-ENTMCNC: 33.2 G/DL (ref 31.5–35.7)
MCHC RBC AUTO-ENTMCNC: 33.5 G/DL (ref 31.5–35.7)
MCHC RBC AUTO-ENTMCNC: 33.8 G/DL (ref 31.5–35.7)
MCHC RBC AUTO-ENTMCNC: 33.9 G/DL (ref 31.5–35.7)
MCV RBC AUTO: 100 FL (ref 79–97)
MCV RBC AUTO: 102 FL (ref 79–97)
MCV RBC AUTO: 107.6 FL (ref 79–97)
MCV RBC AUTO: 90 FL (ref 79–97)
MCV RBC AUTO: 91 FL (ref 79–97)
MCV RBC AUTO: 92 FL (ref 79–97)
MCV RBC AUTO: 92 FL (ref 79–97)
MCV RBC AUTO: 98 FL (ref 79–97)
MCV RBC AUTO: 99 FL (ref 79–97)
MCV RBC AUTO: 99 FL (ref 79–97)
MONOCYTES # BLD AUTO: 0.3 10*3/MM3 (ref 0.1–0.9)
MONOCYTES # BLD AUTO: 0.3 X10E3/UL (ref 0.1–0.9)
MONOCYTES # BLD AUTO: 0.4 X10E3/UL (ref 0.1–0.9)
MONOCYTES # BLD AUTO: 0.4 X10E3/UL (ref 0.1–0.9)
MONOCYTES # BLD AUTO: 0.5 X10E3/UL (ref 0.1–0.9)
MONOCYTES # BLD AUTO: 0.6 X10E3/UL (ref 0.1–0.9)
MONOCYTES # BLD AUTO: 0.66 10*3/MM3 (ref 0.1–0.9)
MONOCYTES NFR BLD AUTO: 10 %
MONOCYTES NFR BLD AUTO: 10.8 % (ref 5–12)
MONOCYTES NFR BLD AUTO: 15 %
MONOCYTES NFR BLD AUTO: 17 %
MONOCYTES NFR BLD AUTO: 19 %
MONOCYTES NFR BLD AUTO: 5.8 % (ref 5–12)
MONOCYTES NFR BLD AUTO: 6 %
MONOCYTES NFR BLD AUTO: 6 %
MONOCYTES NFR BLD AUTO: 7 %
MONOCYTES NFR BLD AUTO: 8 %
MONOCYTES NFR BLD AUTO: 8 %
MONOCYTES NFR BLD AUTO: 9 %
MORPHOLOGY BLD-IMP: (no result)
MORPHOLOGY BLD-IMP: (no result)
NEUTROPHILS # BLD AUTO: 1.5 X10E3/UL (ref 1.4–7)
NEUTROPHILS # BLD AUTO: 1.7 X10E3/UL (ref 1.4–7)
NEUTROPHILS # BLD AUTO: 2.7 X10E3/UL (ref 1.4–7)
NEUTROPHILS # BLD AUTO: 3.7 X10E3/UL (ref 1.4–7)
NEUTROPHILS # BLD AUTO: 3.7 X10E3/UL (ref 1.4–7)
NEUTROPHILS # BLD AUTO: 3.8 X10E3/UL (ref 1.4–7)
NEUTROPHILS # BLD AUTO: 4.1 X10E3/UL (ref 1.4–7)
NEUTROPHILS # BLD AUTO: 4.2 10*3/MM3 (ref 1.7–7)
NEUTROPHILS # BLD AUTO: 4.2 X10E3/UL (ref 1.4–7)
NEUTROPHILS # BLD AUTO: 4.52 10*3/MM3 (ref 1.7–7)
NEUTROPHILS # BLD AUTO: 4.9 X10E3/UL (ref 1.4–7)
NEUTROPHILS # BLD AUTO: 6.6 X10E3/UL (ref 1.4–7)
NEUTROPHILS NFR BLD AUTO: 51 %
NEUTROPHILS NFR BLD AUTO: 57 %
NEUTROPHILS NFR BLD AUTO: 67 %
NEUTROPHILS NFR BLD AUTO: 69 %
NEUTROPHILS NFR BLD AUTO: 73 %
NEUTROPHILS NFR BLD AUTO: 73 %
NEUTROPHILS NFR BLD AUTO: 73.7 % (ref 42.7–76)
NEUTROPHILS NFR BLD AUTO: 74 %
NEUTROPHILS NFR BLD AUTO: 75 %
NEUTROPHILS NFR BLD AUTO: 79 %
NEUTROPHILS NFR BLD AUTO: 80 %
NEUTROPHILS NFR BLD AUTO: 80.4 % (ref 42.7–76)
NITRITE UR QL STRIP: NEGATIVE
NRBC BLD AUTO-RTO: 0 /100 WBC (ref 0–0.2)
PH UR STRIP: 5.5 [PH] (ref 5–7.5)
PH UR STRIP: 6 [PH] (ref 5–7.5)
PLATELET # BLD AUTO: 267 X10E3/UL (ref 150–450)
PLATELET # BLD AUTO: 275 X10E3/UL (ref 150–450)
PLATELET # BLD AUTO: 284 X10E3/UL (ref 150–450)
PLATELET # BLD AUTO: 310 X10E3/UL (ref 150–450)
PLATELET # BLD AUTO: 316 10*3/MM3 (ref 140–450)
PLATELET # BLD AUTO: 347 X10E3/UL (ref 150–450)
PLATELET # BLD AUTO: 360 X10E3/UL (ref 150–450)
PLATELET # BLD AUTO: 368 X10E3/UL (ref 150–450)
PLATELET # BLD AUTO: 370 X10E3/UL (ref 150–450)
PLATELET # BLD AUTO: 423 10*3/MM3 (ref 140–450)
PLATELET # BLD AUTO: 428 X10E3/UL (ref 150–450)
PLATELET # BLD AUTO: 519 X10E3/UL (ref 150–450)
PMV BLD AUTO: 6.1 FL (ref 6–12)
POTASSIUM BLD-SCNC: 3.7 MMOL/L (ref 3.5–5.2)
POTASSIUM SERPL-SCNC: 3.4 MMOL/L (ref 3.5–5.2)
POTASSIUM SERPL-SCNC: 3.6 MMOL/L (ref 3.5–5.2)
POTASSIUM SERPL-SCNC: 3.8 MMOL/L (ref 3.5–5.2)
POTASSIUM SERPL-SCNC: 3.8 MMOL/L (ref 3.5–5.2)
POTASSIUM SERPL-SCNC: 4 MMOL/L (ref 3.5–5.2)
POTASSIUM SERPL-SCNC: 4 MMOL/L (ref 3.5–5.2)
POTASSIUM SERPL-SCNC: 4.1 MMOL/L (ref 3.5–5.2)
POTASSIUM SERPL-SCNC: 4.2 MMOL/L (ref 3.5–5.2)
POTASSIUM SERPL-SCNC: 4.5 MMOL/L (ref 3.5–5.2)
POTASSIUM SERPL-SCNC: NORMAL MMOL/L
PROT SERPL-MCNC: 5.8 G/DL (ref 6–8.5)
PROT SERPL-MCNC: 5.8 G/DL (ref 6–8.5)
PROT SERPL-MCNC: 5.9 G/DL (ref 6–8.5)
PROT SERPL-MCNC: 6 G/DL (ref 6–8.5)
PROT SERPL-MCNC: 6.1 G/DL (ref 6–8.5)
PROT SERPL-MCNC: 6.1 G/DL (ref 6–8.5)
PROT SERPL-MCNC: 6.3 G/DL (ref 6–8.5)
PROT SERPL-MCNC: 6.6 G/DL (ref 6–8.5)
PROT SERPL-MCNC: 7.2 G/DL (ref 6–8.5)
PROT SERPL-MCNC: NORMAL G/DL
PROT UR QL STRIP: ABNORMAL
PROT UR QL STRIP: ABNORMAL
PROT UR QL STRIP: NEGATIVE
PROT UR QL STRIP: NEGATIVE
RBC # BLD AUTO: 2.97 X10E6/UL (ref 3.77–5.28)
RBC # BLD AUTO: 3.03 10*6/MM3 (ref 3.77–5.28)
RBC # BLD AUTO: 3.12 X10E6/UL (ref 3.77–5.28)
RBC # BLD AUTO: 3.24 X10E6/UL (ref 3.77–5.28)
RBC # BLD AUTO: 3.32 10*6/MM3 (ref 3.77–5.28)
RBC # BLD AUTO: 3.33 X10E6/UL (ref 3.77–5.28)
RBC # BLD AUTO: 3.36 X10E6/UL (ref 3.77–5.28)
RBC # BLD AUTO: 3.58 X10E6/UL (ref 3.77–5.28)
RBC # BLD AUTO: 3.58 X10E6/UL (ref 3.77–5.28)
RBC # BLD AUTO: 3.61 X10E6/UL (ref 3.77–5.28)
RBC # BLD AUTO: 3.82 X10E6/UL (ref 3.77–5.28)
RBC # BLD AUTO: 3.82 X10E6/UL (ref 3.77–5.28)
SODIUM BLD-SCNC: 141 MMOL/L (ref 136–145)
SODIUM SERPL-SCNC: 139 MMOL/L (ref 134–144)
SODIUM SERPL-SCNC: 141 MMOL/L (ref 136–145)
SODIUM SERPL-SCNC: 142 MMOL/L (ref 134–144)
SODIUM SERPL-SCNC: 143 MMOL/L (ref 134–144)
SODIUM SERPL-SCNC: 143 MMOL/L (ref 134–144)
SODIUM SERPL-SCNC: 144 MMOL/L (ref 134–144)
SODIUM SERPL-SCNC: NORMAL MMOL/L
SP GR UR: 1.01 (ref 1–1.03)
SP GR UR: 1.01 (ref 1–1.03)
SP GR UR: 1.02 (ref 1–1.03)
SP GR UR: 1.02 (ref 1–1.03)
SPECIMEN STATUS: NORMAL
STRESS TARGET HR: 131 BPM
STRESS TARGET HR: 131 BPM
UROBILINOGEN UR STRIP-MCNC: 0.2 MG/DL (ref 0.2–1)
WBC # BLD AUTO: 2.9 X10E3/UL (ref 3.4–10.8)
WBC # BLD AUTO: 2.9 X10E3/UL (ref 3.4–10.8)
WBC # BLD AUTO: 4.1 X10E3/UL (ref 3.4–10.8)
WBC # BLD AUTO: 5 X10E3/UL (ref 3.4–10.8)
WBC # BLD AUTO: 5.2 X10E3/UL (ref 3.4–10.8)
WBC # BLD AUTO: 5.3 X10E3/UL (ref 3.4–10.8)
WBC # BLD AUTO: 5.3 X10E3/UL (ref 3.4–10.8)
WBC # BLD AUTO: 5.5 X10E3/UL (ref 3.4–10.8)
WBC # BLD AUTO: 6.13 10*3/MM3 (ref 3.4–10.8)
WBC # BLD AUTO: 6.3 X10E3/UL (ref 3.4–10.8)
WBC # BLD AUTO: 9 X10E3/UL (ref 3.4–10.8)
WBC NRBC COR # BLD: 5.2 10*3/MM3 (ref 3.4–10.8)
WRITTEN AUTHORIZATION: NORMAL
WRITTEN AUTHORIZATION: NORMAL

## 2019-01-01 PROCEDURE — 99215 OFFICE O/P EST HI 40 MIN: CPT | Performed by: INTERNAL MEDICINE

## 2019-01-01 PROCEDURE — 85025 COMPLETE CBC W/AUTO DIFF WBC: CPT | Performed by: INTERNAL MEDICINE

## 2019-01-01 PROCEDURE — 25010000002 BEVACIZUMAB 100 MG/4ML SOLUTION 4 ML VIAL: Performed by: NURSE PRACTITIONER

## 2019-01-01 PROCEDURE — 25010000002 METHYLPREDNISOLONE PER 125 MG: Performed by: INTERNAL MEDICINE

## 2019-01-01 PROCEDURE — 96413 CHEMO IV INFUSION 1 HR: CPT

## 2019-01-01 PROCEDURE — 36591 DRAW BLOOD OFF VENOUS DEVICE: CPT

## 2019-01-01 PROCEDURE — 96375 TX/PRO/DX INJ NEW DRUG ADDON: CPT

## 2019-01-01 PROCEDURE — 25010000002 DIPHENHYDRAMINE PER 50 MG: Performed by: NURSE PRACTITIONER

## 2019-01-01 PROCEDURE — 25010000003 HEPARIN LOCK FLUCH PER 10 UNITS: Performed by: INTERNAL MEDICINE

## 2019-01-01 PROCEDURE — 80053 COMPREHEN METABOLIC PANEL: CPT | Performed by: INTERNAL MEDICINE

## 2019-01-01 PROCEDURE — 25010000002 DEXAMETHASONE SODIUM PHOSPHATE 100 MG/10ML SOLUTION 10 ML VIAL: Performed by: NURSE PRACTITIONER

## 2019-01-01 PROCEDURE — 25010000002 PALONOSETRON PER 25 MCG: Performed by: NURSE PRACTITIONER

## 2019-01-01 PROCEDURE — 25010000002 BEVACIZUMAB PER 10 MG: Performed by: NURSE PRACTITIONER

## 2019-01-01 PROCEDURE — 25010000002 METHYLPREDNISOLONE PER 125 MG: Performed by: NURSE PRACTITIONER

## 2019-01-01 PROCEDURE — 25010000002 OXALIPLATIN PER 0.5 MG: Performed by: NURSE PRACTITIONER

## 2019-01-01 PROCEDURE — 99215 OFFICE O/P EST HI 40 MIN: CPT | Performed by: NURSE PRACTITIONER

## 2019-01-01 PROCEDURE — 93306 TTE W/DOPPLER COMPLETE: CPT

## 2019-01-01 PROCEDURE — 99213 OFFICE O/P EST LOW 20 MIN: CPT | Performed by: NURSE PRACTITIONER

## 2019-01-01 PROCEDURE — 82550 ASSAY OF CK (CPK): CPT | Performed by: INTERNAL MEDICINE

## 2019-01-01 PROCEDURE — 25010000002 DIPHENHYDRAMINE PER 50 MG: Performed by: INTERNAL MEDICINE

## 2019-01-01 PROCEDURE — 96415 CHEMO IV INFUSION ADDL HR: CPT

## 2019-01-01 PROCEDURE — 0399T HC MYOCARDL STRAIN IMAG QUAN ASSMT PER SESS: CPT

## 2019-01-01 PROCEDURE — 99214 OFFICE O/P EST MOD 30 MIN: CPT | Performed by: INTERNAL MEDICINE

## 2019-01-01 PROCEDURE — 96417 CHEMO IV INFUS EACH ADDL SEQ: CPT

## 2019-01-01 PROCEDURE — 25010000002 CETUXIMAB PER 10 MG: Performed by: NURSE PRACTITIONER

## 2019-01-01 PROCEDURE — 96367 TX/PROPH/DG ADDL SEQ IV INF: CPT

## 2019-01-01 PROCEDURE — 99212 OFFICE O/P EST SF 10 MIN: CPT | Performed by: NURSE PRACTITIONER

## 2019-01-01 PROCEDURE — 83735 ASSAY OF MAGNESIUM: CPT | Performed by: INTERNAL MEDICINE

## 2019-01-01 PROCEDURE — 36415 COLL VENOUS BLD VENIPUNCTURE: CPT

## 2019-01-01 PROCEDURE — 99214 OFFICE O/P EST MOD 30 MIN: CPT | Performed by: NURSE PRACTITIONER

## 2019-01-01 PROCEDURE — 25010000002 CETUXIMAB PER 10 MG: Performed by: INTERNAL MEDICINE

## 2019-01-01 PROCEDURE — 93306 TTE W/DOPPLER COMPLETE: CPT | Performed by: INTERNAL MEDICINE

## 2019-01-01 PROCEDURE — 93308 TTE F-UP OR LMTD: CPT

## 2019-01-01 PROCEDURE — 93308 TTE F-UP OR LMTD: CPT | Performed by: INTERNAL MEDICINE

## 2019-01-01 RX ORDER — FAMOTIDINE 10 MG/ML
20 INJECTION, SOLUTION INTRAVENOUS AS NEEDED
Status: CANCELLED | OUTPATIENT
Start: 2019-01-01

## 2019-01-01 RX ORDER — PALONOSETRON 0.05 MG/ML
0.25 INJECTION, SOLUTION INTRAVENOUS ONCE
Status: COMPLETED | OUTPATIENT
Start: 2019-01-01 | End: 2019-01-01

## 2019-01-01 RX ORDER — SODIUM CHLORIDE 9 MG/ML
250 INJECTION, SOLUTION INTRAVENOUS ONCE
Status: CANCELLED | OUTPATIENT
Start: 2019-01-01

## 2019-01-01 RX ORDER — MEPERIDINE HYDROCHLORIDE 50 MG/ML
25 INJECTION INTRAMUSCULAR; INTRAVENOUS; SUBCUTANEOUS
Status: DISCONTINUED | OUTPATIENT
Start: 2019-01-01 | End: 2019-01-01 | Stop reason: HOSPADM

## 2019-01-01 RX ORDER — METHYLPREDNISOLONE SODIUM SUCCINATE 125 MG/2ML
125 INJECTION, POWDER, LYOPHILIZED, FOR SOLUTION INTRAMUSCULAR; INTRAVENOUS ONCE
Status: CANCELLED | OUTPATIENT
Start: 2019-01-01

## 2019-01-01 RX ORDER — FAMOTIDINE 10 MG/ML
20 INJECTION, SOLUTION INTRAVENOUS AS NEEDED
Status: CANCELLED | OUTPATIENT
Start: 2020-01-01

## 2019-01-01 RX ORDER — PALONOSETRON 0.05 MG/ML
0.25 INJECTION, SOLUTION INTRAVENOUS ONCE
Status: CANCELLED | OUTPATIENT
Start: 2019-01-01

## 2019-01-01 RX ORDER — ACETAMINOPHEN 325 MG/1
650 TABLET ORAL ONCE
Status: COMPLETED | OUTPATIENT
Start: 2019-01-01 | End: 2019-01-01

## 2019-01-01 RX ORDER — ONDANSETRON HYDROCHLORIDE 8 MG/1
8 TABLET, FILM COATED ORAL 3 TIMES DAILY PRN
Qty: 30 TABLET | Refills: 5 | Status: SHIPPED | OUTPATIENT
Start: 2019-01-01 | End: 2020-01-01 | Stop reason: SDUPTHER

## 2019-01-01 RX ORDER — METHYLPREDNISOLONE SODIUM SUCCINATE 125 MG/2ML
125 INJECTION, POWDER, LYOPHILIZED, FOR SOLUTION INTRAMUSCULAR; INTRAVENOUS ONCE
Status: COMPLETED | OUTPATIENT
Start: 2019-01-01 | End: 2019-01-01

## 2019-01-01 RX ORDER — SODIUM CHLORIDE 9 MG/ML
250 INJECTION, SOLUTION INTRAVENOUS ONCE
Status: COMPLETED | OUTPATIENT
Start: 2019-01-01 | End: 2019-01-01

## 2019-01-01 RX ORDER — FAMOTIDINE 10 MG/ML
20 INJECTION, SOLUTION INTRAVENOUS ONCE
Status: CANCELLED | OUTPATIENT
Start: 2019-01-01

## 2019-01-01 RX ORDER — FAMOTIDINE 10 MG/ML
20 INJECTION, SOLUTION INTRAVENOUS ONCE
Status: COMPLETED | OUTPATIENT
Start: 2019-01-01 | End: 2019-01-01

## 2019-01-01 RX ORDER — DIPHENHYDRAMINE HYDROCHLORIDE 50 MG/ML
50 INJECTION INTRAMUSCULAR; INTRAVENOUS AS NEEDED
Status: CANCELLED | OUTPATIENT
Start: 2019-01-01

## 2019-01-01 RX ORDER — DEXTROSE MONOHYDRATE 50 MG/ML
250 INJECTION, SOLUTION INTRAVENOUS ONCE
Status: COMPLETED | OUTPATIENT
Start: 2019-01-01 | End: 2019-01-01

## 2019-01-01 RX ORDER — METHYLPREDNISOLONE SODIUM SUCCINATE 125 MG/2ML
125 INJECTION, POWDER, LYOPHILIZED, FOR SOLUTION INTRAMUSCULAR; INTRAVENOUS ONCE
Status: CANCELLED | OUTPATIENT
Start: 2020-01-01

## 2019-01-01 RX ORDER — ACETAMINOPHEN 325 MG/1
650 TABLET ORAL ONCE
Status: CANCELLED | OUTPATIENT
Start: 2019-01-01

## 2019-01-01 RX ORDER — MEPERIDINE HYDROCHLORIDE 50 MG/ML
25 INJECTION INTRAMUSCULAR; INTRAVENOUS; SUBCUTANEOUS
Status: CANCELLED | OUTPATIENT
Start: 2019-01-01 | End: 2019-01-01

## 2019-01-01 RX ORDER — DIPHENHYDRAMINE HYDROCHLORIDE 50 MG/ML
50 INJECTION INTRAMUSCULAR; INTRAVENOUS AS NEEDED
Status: CANCELLED | OUTPATIENT
Start: 2020-01-01

## 2019-01-01 RX ORDER — SODIUM CHLORIDE 9 MG/ML
250 INJECTION, SOLUTION INTRAVENOUS ONCE
Status: CANCELLED | OUTPATIENT
Start: 2020-01-01

## 2019-01-01 RX ORDER — LIDOCAINE AND PRILOCAINE 25; 25 MG/G; MG/G
CREAM TOPICAL AS NEEDED
Qty: 30 G | Refills: 3 | Status: SHIPPED | OUTPATIENT
Start: 2019-01-01

## 2019-01-01 RX ORDER — DEXTROSE MONOHYDRATE 50 MG/ML
250 INJECTION, SOLUTION INTRAVENOUS ONCE
Status: CANCELLED | OUTPATIENT
Start: 2019-01-01

## 2019-01-01 RX ORDER — MEPERIDINE HYDROCHLORIDE 50 MG/ML
25 INJECTION INTRAMUSCULAR; INTRAVENOUS; SUBCUTANEOUS
Status: CANCELLED | OUTPATIENT
Start: 2020-01-01

## 2019-01-01 RX ORDER — FAMOTIDINE 10 MG/ML
20 INJECTION, SOLUTION INTRAVENOUS ONCE
Status: CANCELLED | OUTPATIENT
Start: 2020-01-01

## 2019-01-01 RX ORDER — DIPHENOXYLATE HYDROCHLORIDE AND ATROPINE SULFATE 2.5; .025 MG/1; MG/1
1 TABLET ORAL 4 TIMES DAILY PRN
Qty: 30 TABLET | Refills: 0 | Status: SHIPPED | OUTPATIENT
Start: 2019-01-01

## 2019-01-01 RX ORDER — MEPERIDINE HYDROCHLORIDE 50 MG/ML
25 INJECTION INTRAMUSCULAR; INTRAVENOUS; SUBCUTANEOUS
Status: CANCELLED | OUTPATIENT
Start: 2019-01-01

## 2019-01-01 RX ORDER — DIPHENHYDRAMINE HYDROCHLORIDE 50 MG/ML
50 INJECTION INTRAMUSCULAR; INTRAVENOUS AS NEEDED
Status: DISCONTINUED | OUTPATIENT
Start: 2019-01-01 | End: 2019-01-01 | Stop reason: HOSPADM

## 2019-01-01 RX ORDER — FAMOTIDINE 10 MG/ML
20 INJECTION, SOLUTION INTRAVENOUS ONCE
Status: DISCONTINUED | OUTPATIENT
Start: 2019-01-01 | End: 2019-01-01 | Stop reason: HOSPADM

## 2019-01-01 RX ORDER — FAMOTIDINE 10 MG/ML
20 INJECTION, SOLUTION INTRAVENOUS AS NEEDED
Status: DISCONTINUED | OUTPATIENT
Start: 2019-01-01 | End: 2019-01-01 | Stop reason: HOSPADM

## 2019-01-01 RX ORDER — CAPECITABINE 500 MG/1
TABLET, FILM COATED ORAL
Qty: 84 TABLET | Refills: 7 | Status: SHIPPED | OUTPATIENT
Start: 2019-01-01 | End: 2019-01-01 | Stop reason: ALTCHOICE

## 2019-01-01 RX ORDER — ACETAMINOPHEN 325 MG/1
650 TABLET ORAL ONCE
Status: CANCELLED | OUTPATIENT
Start: 2020-01-01

## 2019-01-01 RX ORDER — ACETAMINOPHEN 325 MG/1
650 TABLET ORAL ONCE
Status: DISCONTINUED | OUTPATIENT
Start: 2019-01-01 | End: 2019-01-01 | Stop reason: HOSPADM

## 2019-01-01 RX ADMIN — HEPARIN 500 UNITS: 100 SYRINGE at 13:15

## 2019-01-01 RX ADMIN — PALONOSETRON HYDROCHLORIDE 0.25 MG: 0.25 INJECTION INTRAVENOUS at 09:39

## 2019-01-01 RX ADMIN — HEPARIN 500 UNITS: 100 SYRINGE at 14:54

## 2019-01-01 RX ADMIN — SODIUM CHLORIDE, PRESERVATIVE FREE 500 UNITS: 5 INJECTION INTRAVENOUS at 13:43

## 2019-01-01 RX ADMIN — CETUXIMAB 400 MG: 2 SOLUTION INTRAVENOUS at 11:21

## 2019-01-01 RX ADMIN — METHYLPREDNISOLONE SODIUM SUCCINATE 125 MG: 125 INJECTION, POWDER, FOR SOLUTION INTRAMUSCULAR; INTRAVENOUS at 10:15

## 2019-01-01 RX ADMIN — DIPHENHYDRAMINE HYDROCHLORIDE 50 MG: 50 INJECTION INTRAMUSCULAR; INTRAVENOUS at 10:48

## 2019-01-01 RX ADMIN — HEPARIN 500 UNITS: 100 SYRINGE at 14:10

## 2019-01-01 RX ADMIN — SODIUM CHLORIDE 250 ML: 9 INJECTION, SOLUTION INTRAVENOUS at 09:17

## 2019-01-01 RX ADMIN — PALONOSETRON HYDROCHLORIDE 0.25 MG: 0.25 INJECTION INTRAVENOUS at 09:32

## 2019-01-01 RX ADMIN — FAMOTIDINE 20 MG: 10 INJECTION INTRAVENOUS at 09:18

## 2019-01-01 RX ADMIN — FAMOTIDINE 20 MG: 10 INJECTION INTRAVENOUS at 13:19

## 2019-01-01 RX ADMIN — HEPARIN 500 UNITS: 100 SYRINGE at 09:35

## 2019-01-01 RX ADMIN — ACETAMINOPHEN 650 MG: 325 TABLET ORAL at 08:50

## 2019-01-01 RX ADMIN — DEXTROSE MONOHYDRATE 250 ML: 50 INJECTION, SOLUTION INTRAVENOUS at 11:18

## 2019-01-01 RX ADMIN — SODIUM CHLORIDE 250 ML: 9 INJECTION, SOLUTION INTRAVENOUS at 08:53

## 2019-01-01 RX ADMIN — HEPARIN 500 UNITS: 100 SYRINGE at 10:38

## 2019-01-01 RX ADMIN — FAMOTIDINE 20 MG: 10 INJECTION INTRAVENOUS at 13:40

## 2019-01-01 RX ADMIN — DEXAMETHASONE SODIUM PHOSPHATE 12 MG: 10 INJECTION, SOLUTION INTRAMUSCULAR; INTRAVENOUS at 09:36

## 2019-01-01 RX ADMIN — SODIUM CHLORIDE 250 ML: 9 INJECTION, SOLUTION INTRAVENOUS at 11:28

## 2019-01-01 RX ADMIN — HEPARIN 500 UNITS: 100 SYRINGE at 15:58

## 2019-01-01 RX ADMIN — HEPARIN SODIUM (PORCINE) LOCK FLUSH IV SOLN 100 UNIT/ML 500 UNITS: 100 SOLUTION at 10:10

## 2019-01-01 RX ADMIN — HEPARIN SODIUM (PORCINE) LOCK FLUSH IV SOLN 100 UNIT/ML 500 UNITS: 100 SOLUTION at 12:52

## 2019-01-01 RX ADMIN — SODIUM CHLORIDE 250 ML: 9 INJECTION, SOLUTION INTRAVENOUS at 13:39

## 2019-01-01 RX ADMIN — OXALIPLATIN 230 MG: 5 INJECTION, SOLUTION INTRAVENOUS at 11:34

## 2019-01-01 RX ADMIN — HEPARIN SODIUM (PORCINE) LOCK FLUSH IV SOLN 100 UNIT/ML 500 UNITS: 100 SOLUTION at 12:55

## 2019-01-01 RX ADMIN — DEXTROSE MONOHYDRATE 250 ML: 50 INJECTION, SOLUTION INTRAVENOUS at 10:43

## 2019-01-01 RX ADMIN — DEXAMETHASONE SODIUM PHOSPHATE 12 MG: 10 INJECTION, SOLUTION INTRAMUSCULAR; INTRAVENOUS at 11:19

## 2019-01-01 RX ADMIN — SODIUM CHLORIDE 250 ML: 9 INJECTION, SOLUTION INTRAVENOUS at 10:14

## 2019-01-01 RX ADMIN — METHYLPREDNISOLONE SODIUM SUCCINATE 125 MG: 125 INJECTION, POWDER, FOR SOLUTION INTRAMUSCULAR; INTRAVENOUS at 11:30

## 2019-01-01 RX ADMIN — DIPHENHYDRAMINE HYDROCHLORIDE 50 MG: 50 INJECTION INTRAMUSCULAR; INTRAVENOUS at 11:37

## 2019-01-01 RX ADMIN — METHYLPREDNISOLONE SODIUM SUCCINATE 125 MG: 125 INJECTION, POWDER, FOR SOLUTION INTRAMUSCULAR; INTRAVENOUS at 10:46

## 2019-01-01 RX ADMIN — METHYLPREDNISOLONE SODIUM SUCCINATE 125 MG: 125 INJECTION, POWDER, FOR SOLUTION INTRAMUSCULAR; INTRAVENOUS at 13:41

## 2019-01-01 RX ADMIN — SODIUM CHLORIDE 250 ML: 9 INJECTION, SOLUTION INTRAVENOUS at 09:45

## 2019-01-01 RX ADMIN — ACETAMINOPHEN 650 MG: 325 TABLET ORAL at 13:17

## 2019-01-01 RX ADMIN — HEPARIN 500 UNITS: 100 SYRINGE at 12:54

## 2019-01-01 RX ADMIN — OXALIPLATIN 230 MG: 5 INJECTION, SOLUTION INTRAVENOUS at 12:46

## 2019-01-01 RX ADMIN — HEPARIN SODIUM (PORCINE) LOCK FLUSH IV SOLN 100 UNIT/ML 500 UNITS: 100 SOLUTION at 14:00

## 2019-01-01 RX ADMIN — CETUXIMAB 400 MG: 2 SOLUTION INTRAVENOUS at 09:25

## 2019-01-01 RX ADMIN — OXALIPLATIN 230 MG: 5 INJECTION, SOLUTION INTRAVENOUS at 10:54

## 2019-01-01 RX ADMIN — BEVACIZUMAB 500 MG: 400 INJECTION, SOLUTION INTRAVENOUS at 09:53

## 2019-01-01 RX ADMIN — FAMOTIDINE 20 MG: 10 INJECTION, SOLUTION INTRAVENOUS at 10:44

## 2019-01-01 RX ADMIN — DEXTROSE MONOHYDRATE 250 ML: 50 INJECTION, SOLUTION INTRAVENOUS at 10:54

## 2019-01-01 RX ADMIN — METHYLPREDNISOLONE SODIUM SUCCINATE 125 MG: 125 INJECTION, POWDER, FOR SOLUTION INTRAMUSCULAR; INTRAVENOUS at 13:17

## 2019-01-01 RX ADMIN — FAMOTIDINE 20 MG: 10 INJECTION INTRAVENOUS at 08:59

## 2019-01-01 RX ADMIN — ACETAMINOPHEN 650 MG: 325 TABLET ORAL at 11:29

## 2019-01-01 RX ADMIN — BEVACIZUMAB 500 MG: 400 INJECTION, SOLUTION INTRAVENOUS at 10:02

## 2019-01-01 RX ADMIN — DEXAMETHASONE SODIUM PHOSPHATE 12 MG: 10 INJECTION, SOLUTION INTRAMUSCULAR; INTRAVENOUS at 09:48

## 2019-01-01 RX ADMIN — ACETAMINOPHEN 650 MG: 325 TABLET ORAL at 09:17

## 2019-01-01 RX ADMIN — DIPHENHYDRAMINE HYDROCHLORIDE 50 MG: 50 INJECTION INTRAMUSCULAR; INTRAVENOUS at 09:04

## 2019-01-01 RX ADMIN — HEPARIN 500 UNITS: 100 SYRINGE at 15:05

## 2019-01-01 RX ADMIN — BEVACIZUMAB 500 MG: 400 INJECTION, SOLUTION INTRAVENOUS at 10:09

## 2019-01-01 RX ADMIN — CETUXIMAB 400 MG: 2 SOLUTION INTRAVENOUS at 12:09

## 2019-01-01 RX ADMIN — FAMOTIDINE 20 MG: 10 INJECTION, SOLUTION INTRAVENOUS at 10:20

## 2019-01-01 RX ADMIN — CETUXIMAB 400 MG: 2 SOLUTION INTRAVENOUS at 09:47

## 2019-01-01 RX ADMIN — SODIUM CHLORIDE 250 ML: 9 INJECTION, SOLUTION INTRAVENOUS at 09:31

## 2019-01-01 RX ADMIN — OXALIPLATIN 230 MG: 5 INJECTION, SOLUTION, CONCENTRATE INTRAVENOUS at 10:43

## 2019-01-01 RX ADMIN — ACETAMINOPHEN 650 MG: 325 TABLET ORAL at 10:44

## 2019-01-01 RX ADMIN — HEPARIN 500 UNITS: 100 SYRINGE at 13:30

## 2019-01-01 RX ADMIN — SODIUM CHLORIDE 250 ML: 9 INJECTION, SOLUTION INTRAVENOUS at 10:44

## 2019-01-01 RX ADMIN — DIPHENHYDRAMINE HYDROCHLORIDE 50 MG: 50 INJECTION INTRAMUSCULAR; INTRAVENOUS at 13:43

## 2019-01-01 RX ADMIN — SODIUM CHLORIDE 250 ML: 9 INJECTION, SOLUTION INTRAVENOUS at 09:39

## 2019-01-01 RX ADMIN — HEPARIN SODIUM (PORCINE) LOCK FLUSH IV SOLN 100 UNIT/ML 500 UNITS: 100 SOLUTION at 13:30

## 2019-01-01 RX ADMIN — HEPARIN 500 UNITS: 100 SYRINGE at 12:29

## 2019-01-01 RX ADMIN — HEPARIN 500 UNITS: 100 SYRINGE at 14:16

## 2019-01-01 RX ADMIN — CETUXIMAB 700 MG: 2 SOLUTION INTRAVENOUS at 11:06

## 2019-01-01 RX ADMIN — DIPHENHYDRAMINE HYDROCHLORIDE 50 MG: 50 INJECTION INTRAMUSCULAR; INTRAVENOUS at 13:20

## 2019-01-01 RX ADMIN — DIPHENHYDRAMINE HYDROCHLORIDE 50 MG: 50 INJECTION INTRAMUSCULAR; INTRAVENOUS at 10:20

## 2019-01-01 RX ADMIN — CETUXIMAB 440 MG: 2 SOLUTION INTRAVENOUS at 13:57

## 2019-01-01 RX ADMIN — PALONOSETRON HYDROCHLORIDE 0.25 MG: 0.25 INJECTION INTRAVENOUS at 11:17

## 2019-01-01 RX ADMIN — HEPARIN SODIUM (PORCINE) LOCK FLUSH IV SOLN 100 UNIT/ML 500 UNITS: 100 SOLUTION at 09:30

## 2019-01-01 RX ADMIN — CETUXIMAB 400 MG: 2 SOLUTION INTRAVENOUS at 14:01

## 2019-01-01 RX ADMIN — HEPARIN SODIUM (PORCINE) LOCK FLUSH IV SOLN 100 UNIT/ML 500 UNITS: 100 SOLUTION at 10:47

## 2019-01-01 RX ADMIN — DEXAMETHASONE SODIUM PHOSPHATE 12 MG: 10 INJECTION, SOLUTION INTRAMUSCULAR; INTRAVENOUS at 09:40

## 2019-01-01 RX ADMIN — SODIUM CHLORIDE 250 ML: 9 INJECTION, SOLUTION INTRAVENOUS at 13:20

## 2019-01-01 RX ADMIN — BEVACIZUMAB 500 MG: 400 INJECTION, SOLUTION INTRAVENOUS at 11:36

## 2019-01-01 RX ADMIN — ACETAMINOPHEN 650 MG: 325 TABLET ORAL at 10:13

## 2019-01-01 RX ADMIN — METHYLPREDNISOLONE SODIUM SUCCINATE 125 MG: 125 INJECTION, POWDER, FOR SOLUTION INTRAMUSCULAR; INTRAVENOUS at 08:54

## 2019-01-01 RX ADMIN — PALONOSETRON HYDROCHLORIDE 0.25 MG: 0.25 INJECTION INTRAVENOUS at 09:46

## 2019-01-01 RX ADMIN — FAMOTIDINE 20 MG: 10 INJECTION, SOLUTION INTRAVENOUS at 11:32

## 2019-01-01 RX ADMIN — DIPHENHYDRAMINE HYDROCHLORIDE 50 MG: 50 INJECTION INTRAMUSCULAR; INTRAVENOUS at 09:23

## 2019-01-01 RX ADMIN — METHYLPREDNISOLONE SODIUM SUCCINATE 125 MG: 125 INJECTION, POWDER, FOR SOLUTION INTRAMUSCULAR; INTRAVENOUS at 09:21

## 2019-01-01 RX ADMIN — DEXTROSE MONOHYDRATE 250 ML: 50 INJECTION, SOLUTION INTRAVENOUS at 11:34

## 2019-04-30 ENCOUNTER — CONSULT (OUTPATIENT)
Dept: ONCOLOGY | Facility: CLINIC | Age: 64
End: 2019-04-30

## 2019-04-30 ENCOUNTER — RESULTS ENCOUNTER (OUTPATIENT)
Dept: ONCOLOGY | Facility: CLINIC | Age: 64
End: 2019-04-30

## 2019-04-30 VITALS — WEIGHT: 148 LBS | RESPIRATION RATE: 18 BRPM | HEIGHT: 66 IN | BODY MASS INDEX: 23.78 KG/M2 | TEMPERATURE: 98.5 F

## 2019-04-30 DIAGNOSIS — C18.8 OVERLAPPING MALIGNANT NEOPLASM OF COLON (HCC): Primary | ICD-10-CM

## 2019-04-30 DIAGNOSIS — C18.8 OVERLAPPING MALIGNANT NEOPLASM OF COLON (HCC): ICD-10-CM

## 2019-04-30 PROCEDURE — 99205 OFFICE O/P NEW HI 60 MIN: CPT | Performed by: INTERNAL MEDICINE

## 2019-04-30 RX ORDER — IBUPROFEN 400 MG/1
400 TABLET ORAL 2 TIMES DAILY
COMMUNITY

## 2019-05-01 ENCOUNTER — TELEPHONE (OUTPATIENT)
Dept: ONCOLOGY | Facility: CLINIC | Age: 64
End: 2019-05-01

## 2019-05-01 LAB
BASOPHILS # BLD AUTO: 0 X10E3/UL (ref 0–0.2)
BASOPHILS NFR BLD AUTO: 0 %
EOSINOPHIL # BLD AUTO: 0.2 X10E3/UL (ref 0–0.4)
EOSINOPHIL NFR BLD AUTO: 4 %
ERYTHROCYTE [DISTWIDTH] IN BLOOD BY AUTOMATED COUNT: 14.5 % (ref 12.3–15.4)
HCT VFR BLD AUTO: 36.5 % (ref 34–46.6)
HGB BLD-MCNC: 11.9 G/DL (ref 11.1–15.9)
IMM GRANULOCYTES # BLD AUTO: 0 X10E3/UL (ref 0–0.1)
IMM GRANULOCYTES NFR BLD AUTO: 0 %
LYMPHOCYTES # BLD AUTO: 1.5 X10E3/UL (ref 0.7–3.1)
LYMPHOCYTES NFR BLD AUTO: 27 %
MCH RBC QN AUTO: 29.5 PG (ref 26.6–33)
MCHC RBC AUTO-ENTMCNC: 32.6 G/DL (ref 31.5–35.7)
MCV RBC AUTO: 91 FL (ref 79–97)
MONOCYTES # BLD AUTO: 0.4 X10E3/UL (ref 0.1–0.9)
MONOCYTES NFR BLD AUTO: 8 %
NEUTROPHILS # BLD AUTO: 3.5 X10E3/UL (ref 1.4–7)
NEUTROPHILS NFR BLD AUTO: 61 %
PLATELET # BLD AUTO: 342 X10E3/UL (ref 150–379)
RBC # BLD AUTO: 4.03 X10E6/UL (ref 3.77–5.28)
WBC # BLD AUTO: 5.7 X10E3/UL (ref 3.4–10.8)

## 2019-05-01 NOTE — TELEPHONE ENCOUNTER
----- Message from Casi Vázquez sent at 5/1/2019  8:39 AM EDT -----  Regarding: BOOGIE - QUESTIONS   Contact: [phone]  PATIENT CALLED REGARDING HER CONSULTATION WITH DR HYMAN YESTERDAY. SHE WOULD LIKE TO SPEAK TO THE NURSE AND MAYBE GO OVER SOME THINGS THAT DR HYMAN HAD SAID AND MAKE SURE SHE UNDERSTOOD THINGS CORRECTLY AS TO WHAT KIND OF TEST HE WAS ORDERING, ETC.

## 2019-05-01 NOTE — TELEPHONE ENCOUNTER
Spoke with patient and explained MSI and MMR testing and why Dr. Menchaca ordered it.  Verbalized understanding.

## 2019-05-02 ENCOUNTER — TELEPHONE (OUTPATIENT)
Dept: GENETICS | Facility: HOSPITAL | Age: 64
End: 2019-05-02

## 2019-05-03 ENCOUNTER — TELEPHONE (OUTPATIENT)
Dept: ONCOLOGY | Facility: CLINIC | Age: 64
End: 2019-05-03

## 2019-05-03 NOTE — TELEPHONE ENCOUNTER
Advised patient she we would likely start about 1 week after her office visit on the 14th, but we will not know for sure until we know the definite plan of care.  We should have results for MSI/MMR testing by the 14th.  Patient also asked when she needs to see genetic counselor.  Discussed with Dr. Menchaca.  Ok to see sometime in the next 1-2 months if she needs to wait a little while to coordinate appointment so her  can be there.  Patient verbalized understanding.

## 2019-05-03 NOTE — TELEPHONE ENCOUNTER
----- Message from Carissa Johnson sent at 5/3/2019  9:05 AM EDT -----  Regarding: HYMAN-CHEMO  Contact: [phone]  Patient called and wants to know when her chemo is going to start she and her  are planning a vacation. Please call.

## 2019-05-14 ENCOUNTER — OFFICE VISIT (OUTPATIENT)
Dept: ONCOLOGY | Facility: CLINIC | Age: 64
End: 2019-05-14

## 2019-05-14 ENCOUNTER — TELEPHONE (OUTPATIENT)
Dept: GENETICS | Facility: HOSPITAL | Age: 64
End: 2019-05-14

## 2019-05-14 ENCOUNTER — SPECIALTY PHARMACY (OUTPATIENT)
Dept: ONCOLOGY | Facility: HOSPITAL | Age: 64
End: 2019-05-14

## 2019-05-14 VITALS
DIASTOLIC BLOOD PRESSURE: 55 MMHG | WEIGHT: 149 LBS | HEART RATE: 79 BPM | SYSTOLIC BLOOD PRESSURE: 121 MMHG | BODY MASS INDEX: 23.95 KG/M2 | HEIGHT: 66 IN | RESPIRATION RATE: 18 BRPM | TEMPERATURE: 98.4 F

## 2019-05-14 DIAGNOSIS — C18.8 OVERLAPPING MALIGNANT NEOPLASM OF COLON (HCC): Primary | Chronic | ICD-10-CM

## 2019-05-14 DIAGNOSIS — C18.8 OVERLAPPING MALIGNANT NEOPLASM OF COLON (HCC): Primary | ICD-10-CM

## 2019-05-14 PROCEDURE — 99214 OFFICE O/P EST MOD 30 MIN: CPT | Performed by: INTERNAL MEDICINE

## 2019-05-14 RX ORDER — CAPECITABINE 500 MG/1
TABLET, FILM COATED ORAL
Qty: 180 TABLET | Refills: 0 | Status: SHIPPED | OUTPATIENT
Start: 2019-05-14 | End: 2019-01-01 | Stop reason: SDUPTHER

## 2019-05-14 NOTE — PROGRESS NOTES
CHIEF COMPLAINT: Stage IIIb colon cancer with a history of breast and cervical cancer as below    Problem List:  Oncology/Hematology History    1. Stage IIIB [id number] moderately differentiated colon cancer  2. History of breast cancer 2003 treated with chemotherapy and radiation with Dr. Rivers  3. History of cervical cancer treated with radiation in 1993    -3/25/2019 screening colonoscopy found 5.5 cm tumor at 50 cm near the splenic fracture which was tattooed.  This showed moderately differentiated adenocarcinoma and there was a separate hyperplastic polyp 5 mm at 15 cm.  -3/27/2019 CT chest abdomen pelvis showed tumor at the splenic flexure T3 with transmural invasion and no distant metastases.  Positive gallstones and hiatal hernia.  CEA 2.9 with normal CMP.  -4/3/2019 transverse colectomy to the splenic flexure with primary re-anastomosis revealed moderately differentiated adenocarcinoma extending to the serosa/mesenteric fat with 8 out of 12 lymph nodes involved the largest being 2 cm.  Margins negative.  T3N2B stage IIIb  -4/30/2019 initial medical oncology visit: I reviewed her history.  She has 3 first-degree relatives with breast cancer, thyroid cancer in her family, prostate cancer in her first-degree relatives, and has had 2 other malignancies prior to her colon cancer as outlined above.  I have spoken with Dr. Freddy Esquivel to get mismatch repair testing.  She has seen Dr. Adam Royal who, because of the extent of this next to the spleen feels that localized radiation may be reasonable while not standard.  -5/14/2019 office visit: PCR showed she was mismatch repair proficient; therefore, consider Xeloda radiation followed by 6 months of Xeloda oxaliplatin.  Coordinated this with Dr. Royal who will start radiation May 27.  Following that we will start Xeloda oxaliplatin early July.  She will take 3 tablets of Xeloda twice a day Monday through Friday with radiation.  She will get a chemotherapy  education appointment with our pharmacy doctorate's in [place].  Though mismatch repair proficient, we will still have her see genetic counseling.  I        Overlapping malignant neoplasm of colon (CMS/HCC)    4/30/2019 Initial Diagnosis     Overlapping malignant neoplasm of colon (CMS/HCC)            HISTORY OF PRESENT ILLNESS:  The patient is a 63 y.o. female, here for follow up on management of stage IIIb colon cancer with history of breast and cervical cancer.  She was mismatch repair proficient.  See below for plans and discussions.      History reviewed. No pertinent past medical history.  Past Surgical History:   Procedure Laterality Date   • BREAST LUMPECTOMY Left 2003    [facility]   • COLON SURGERY  2019    [facility] Dr. Hammer       Allergies   Allergen Reactions   • Penicillins Nausea And Vomiting       Family History and Social History reviewed and changed as necessary      REVIEW OF SYSTEM:   Review of Systems   Constitutional: Negative for appetite change, chills, diaphoresis, fatigue, fever and unexpected weight change.   HENT:   Negative for mouth sores, sore throat and trouble swallowing.    Eyes: Negative for icterus.   Respiratory: Negative for cough, hemoptysis and shortness of breath.    Cardiovascular: Negative for chest pain, leg swelling and palpitations.   Gastrointestinal: Negative for abdominal distention, abdominal pain, blood in stool, constipation, diarrhea, nausea and vomiting.   Endocrine: Negative for hot flashes.   Genitourinary: Negative for bladder incontinence, difficulty urinating, dysuria, frequency and hematuria.    Musculoskeletal: Negative for gait problem, neck pain and neck stiffness.   Skin: Negative for rash.   Neurological: Negative for dizziness, gait problem, headaches, light-headedness and numbness.   Hematological: Negative for adenopathy. Does not bruise/bleed easily.   Psychiatric/Behavioral: Negative for depression. The patient is not nervous/anxious.    All  "other systems reviewed and are negative.       PHYSICAL EXAM    Vitals:    05/14/19 1044   BP: 121/55   Pulse: 79   Resp: 18   Temp: 98.4 °F (36.9 °C)   Weight: 67.6 kg (149 lb)   Height: 168.3 cm (66.25\")     Constitutional: Appears well-developed and well-nourished. No distress.   ECOG: (0) Fully active, able to carry on all predisease performance without restriction  HENT:   Head: Normocephalic.   Mouth/Throat: Oropharynx is clear and moist.   Eyes: Conjunctivae are normal. Pupils are equal, round, and reactive to light. No scleral icterus.   Neck: Neck supple. No JVD present. No thyromegaly present.   Cardiovascular: Normal rate, regular rhythm and normal heart sounds.    Pulmonary/Chest: Breath sounds normal. No respiratory distress.   Abdominal: Soft. Exhibits no distension and no mass. There is no hepatosplenomegaly. There is no tenderness. There is no rebound and no guarding.   Musculoskeletal:Exhibits no edema, tenderness or deformity.   Neurological: Alert and oriented to person, place, and time. Exhibits normal muscle tone.   Skin: No ecchymosis, no petechiae and no rash noted. Not diaphoretic. No cyanosis. Nails show no clubbing.   Psychiatric: Normal mood and affect.   Vitals reviewed.      Lab Results   Component Value Date    HGB 11.9 04/30/2019    HCT 36.5 04/30/2019    MCV 91 04/30/2019     04/30/2019    WBC 5.7 04/30/2019    NEUTROABS 3.5 04/30/2019    LYMPHSABS 1.5 04/30/2019    MONOSABS 0.4 04/30/2019    EOSABS 0.2 04/30/2019    BASOSABS 0.0 04/30/2019       No results found for: GLUCOSE, BUN, CREATININE, NA, K, CL, CO2, CALCIUM, PROTEINTOT, ALBUMIN, BILITOT, ALKPHOS, AST, ALT                ASSESSMENT & PLAN:    1. Stage III colon cancer  2. History of breast cancer  3. History of cervical cancer    Discussion:PCR showed she was mismatch repair proficient; therefore, consider Xeloda radiation followed by 6 months of Xeloda oxaliplatin.  Coordinated this with Dr. Royal who will start " radiation May 27.  Following that we will start Xeloda oxaliplatin early July.  She will take 3 tablets of Xeloda twice a day Monday through Friday with radiation.  She will get a chemotherapy education appointment with our pharmacy doctorate's in [place].  Though mismatch repair proficient, we will still have her see genetic counseling.  We will do periodic follow-up scanning every 3 months for the first year or 2 and this will all be according to NCCN guidelines for her follow-up imaging and labs and endoscopies.  She will see my nurse practitioner after she has started treatment for a couple of weeks to make sure she is tolerating the Xeloda along with radiation.  Side effects of Xeloda were covered in detail and will also be reiterated by our pharmacy education team.  Discussed with patient 30 minutes greater than 50% spent counseling.      Zhang Menchaca MD    05/14/2019

## 2019-05-14 NOTE — PROGRESS NOTES
Oral Chemotherapy Teaching      Patient Name/:  Naz Singh   1955  Oral Chemotherapy Regimen:Xeloda 1500mg PO BID on Monday-Friday with Radiation  Date Started Medication: pending radiation start date and medication acquisition      Additional Notes:  Script for Xeloda 1500mg PO BID on MOnday -Friday with RT #180 with 0 RF submitted to [facility] for processing. Pt will need scheduled in oral chemo education clinic for EDU and financial navigation.

## 2019-05-17 ENCOUNTER — SPECIALTY PHARMACY (OUTPATIENT)
Dept: ONCOLOGY | Facility: HOSPITAL | Age: 64
End: 2019-05-17

## 2019-05-17 ENCOUNTER — CLINICAL SUPPORT (OUTPATIENT)
Dept: GENETICS | Facility: HOSPITAL | Age: 64
End: 2019-05-17

## 2019-05-17 ENCOUNTER — APPOINTMENT (OUTPATIENT)
Dept: LAB | Facility: HOSPITAL | Age: 64
End: 2019-05-17

## 2019-05-17 ENCOUNTER — HOSPITAL ENCOUNTER (OUTPATIENT)
Dept: ONCOLOGY | Facility: HOSPITAL | Age: 64
Discharge: HOME | End: 2019-05-17

## 2019-05-17 DIAGNOSIS — Z80.3 FAMILY HISTORY OF BREAST CANCER: ICD-10-CM

## 2019-05-17 DIAGNOSIS — C18.8 OVERLAPPING MALIGNANT NEOPLASM OF COLON (HCC): Primary | ICD-10-CM

## 2019-05-17 DIAGNOSIS — C18.9 MALIGNANT NEOPLASM OF COLON, UNSPECIFIED PART OF COLON (HCC): ICD-10-CM

## 2019-05-17 DIAGNOSIS — Z85.3 HISTORY OF MALIGNANT NEOPLASM OF BREAST: Primary | ICD-10-CM

## 2019-05-17 DIAGNOSIS — Z13.79 GENETIC TESTING: Primary | ICD-10-CM

## 2019-05-17 PROCEDURE — 96040: CPT | Performed by: GENETIC COUNSELOR, MS

## 2019-05-17 RX ORDER — ONDANSETRON HYDROCHLORIDE 8 MG/1
8 TABLET, FILM COATED ORAL 3 TIMES DAILY PRN
Qty: 30 TABLET | Refills: 5 | Status: SHIPPED | OUTPATIENT
Start: 2019-05-17 | End: 2019-01-01 | Stop reason: SDUPTHER

## 2019-05-17 NOTE — PROGRESS NOTES
Oral Chemotherapy Teaching      Patient Name/:  Naz Singh   1955  Oral Chemotherapy Regimen:Xeloda 1500mg PO BID on Monday-Friday with Radiation  Date Started Medication: pending radiation start date and medication acquisition        Initial Teaching Follow Up Comments     Safety     Storage instructions (away from children; away from heat/cold, sunlight, or moisture), handling - use of gloves (caregivers), washing hands after touching pills, managing waste     “How are you storing your medications?”, reminders on storage, proper handling (caregivers using gloves, washing hands, away from children, managing waste, etc.), disposal of medication with D/C or dosage change    Patient counseled on hazardous handling and storage precautions. Discussed plan to wash hands after handling. Caregivers to handle with latex gloves. Store at room temp, away from pets and children. Pt verbalized understanding.      Adherence      patient and/or caregiver on how to take medication, take with/without food, assess their adherence potential, stress importance of adherence, ways to manage adherence (pill boxes, phone reminders, calendars), what to do if miss a dose   “How are you taking your medication?” “How are you remembering to take your medication?”, “How many doses have you missed?”, determine reasons for non-adherence (not remembering, side effects, etc), ways to improve, overadherence? Remind patient of ways to improve/maintain adherence   Discussed plan for Xeloda 1500mg (3 tablets) by mouth twice a day on Monday-Friday with RT. Plan to start with RT. Pt reports Rt start date still pending. Reviewed dosing instructions, take with food, and provided calendar template. All questions answered and printed handout provided. Medication ready to dispense from [facility].      Side Effects/Adverse Reactions      patient on potential side effects, s/s, ways to manage, when to call MD/seek help     Determine if patient  experiencing side effects, ways to manage  Discussed the following side effects including but not limited to: N/V, diarrhea, HFS, rash, neutropenia, fatigue, and mucositis. Reviewed PRN zofran use      Miscellaneous     Food interactions, DDIs, financial issues Determine if patient started any new medications since being placed on oral chemo (analyze for DDI) No DDIs noted with active medication list. Pt with no part D insurance, paying cash      Additional Notes:Discussed aforementioned material with patient in clinic/. All questions and concerns addressed. Provided patient with my contact information and instructions to call should additional questions arise.

## 2019-05-20 NOTE — PROGRESS NOTES
Naz Singh is a 63-year-old female who was seen for genetic counseling due to a personal history of breast cancer and colon cancer.  Ms. Singh was diagnosed with cervical cancer at age 38.  She was diagnosed with a breast cancer at age 47, for which she had a lumpectomy.  She was recently diagnosed with a colon cancer.  IHC showed normal staining of the MMR proteins.  She retains her uterus and ovaries.   Ms. Singh was interested in discussing her risk for a hereditary cancer syndrome, and decided to pursue genetic testing.   Ms. Singh opted to pursue comprehensive testing via the CancerNext panel ordered through [facility] which includes BRCA1/2 and 32 additional genes associated with an increased risk of breast cancer or other cancers (APC, OLIVER, BARD1, BMPR1A, BRCA1, BRCA2, BRIP1, CDH1, CDK4, CDKN2A, CHEK2, EPCAM, GREM1, MLH1, MRE11A, MSH2, MSH6, MUTYH, NBN, NF1, PALB2, PMS2, POLD1, POLE, PTEN, RAD50, RAD51C, RAD51D, SMAD4, SMARCA4, STK11, and TP53). Results are expected in 2-3 weeks.     PERTINENT FAMILY HISTORY:  Brother:  Prostate cancer, 84  Brother:  Colon cancer, 70s  Sister:   Breast cancer     Lung cancer     Kidney cancer  Sister:   Breast cancer, 56  Mother:  Breast cancer, 65  Mat. great-aunts (x2): Breast cancer    Multiple maternal and paternal female relatives have had RAIZA-BSOs in their 20-30s due to uterine fibroids.    RISK ASSESSMENT:  Ms. Singh’s personal history of early onset breast cancer raises the question of a hereditary cancer syndrome.   NCCN guidelines recommend BRCA1/2 testing for individual with breast cancer at or under age 50, if there is any additional history of breast cancer.  Therefore, testing is appropriate for Ms. Singh.  We discussed BRCA1/2 testing as well as the option of pursuing a panel that would test for other genes known to impact cancer risk in addition to BRCA1/2. This risk assessment is based on the family history information provided at the time of the appointment.   The assessment could change in the future should new information be obtained.     GENETIC COUNSELING (35 minutes): We reviewed the family history information in detail.  Cases of breast cancer follow three general patterns: sporadic, familial, and hereditary.  While most cancer is sporadic, some cases appear to occur in family clusters.  These cases are said to be familial and account for 10-20% of breast cancer cases.  Familial cases may be due to a combination of shared genes and environmental factors among family members.  In even fewer families, the cancer is said to be inherited, and the genes responsible for the cancer are known.       Family histories typical of hereditary cancer syndromes usually include multiple first- and second-degree relatives diagnosed with cancer types that define a syndrome.  These cases tend to be diagnosed at younger-than-expected ages and can be bilateral or multifocal.  The cancer in these families follows an autosomal dominant inheritance pattern, which indicates the likely presence of a mutation in a cancer susceptibility gene.  Children and siblings of an individual believed to carry this mutation have a 50% chance of inheriting that mutation, thereby inheriting the increased risk to develop cancer.  These mutations can be passed down from the maternal or the paternal lineage.     Hereditary breast cancer accounts for 5-10% of all cases of breast cancer.  A significant proportion of hereditary breast cancer can be attributed to mutations in the BRCA1 and BRCA2 genes.  Mutations in these genes confer an increased risk for breast cancer, ovarian cancer, male breast cancer, prostate cancer and pancreatic cancer.  Women with a BRCA1 or BRCA2 mutation who have already been diagnosed with breast cancer have up to a 60% lifetime risk of a second breast cancer.  There are other genes that are known to be associated with an increased risk for breast cancer and other cancers, including  colon cancer.   Based on Ms. Singh’s desire to get as much information as possible regarding her personal risks and potential risks for her family, she opted to pursue testing through a panel that would look at several other genes known to increase the risk of cancer.     GENETIC TESTING:  The risks, benefits and limitations of genetic testing and implications for clinical management following testing were reviewed.  DNA test results can influence decisions regarding screening, prevention and surgical management.  Genetic testing can have significant psychological implications for both individuals and families.  Also discussed was the possibility of insurance discrimination based on genetic test results and the laws ([place]) in place to prevent this.     We discussed panel testing, which would involve testing for BRCA1/2 as well as several other cancer susceptibility genes at the same time.  The benefits and limitations of genetic testing were discussed and Ms. Singh decided to pursue testing. The implications of a positive or negative test result were discussed. We discussed the possibility that, in some cases, genetic test results may be uninformative due to the identification of a genetic variant. These variants may or may not be associated with an increased cancer risk.  Given her personal history, a negative test result would not eliminate all breast cancer risk to her relatives, although the risk would not be as high as it would with positive genetic testing.          PLAN: Results are expected in 2-3 weeks, and the patient will be contacted by telephone at that time.  Genetic counseling remains available to Ms. Singh and her family in the future, and she is welcome to contact us at [phone] with any questions she may have.        Itzel Elliott MS, [facility], [facility]  Licensed Certified Genetic Counselor

## 2019-05-22 ENCOUNTER — TELEPHONE (OUTPATIENT)
Dept: ONCOLOGY | Facility: CLINIC | Age: 64
End: 2019-05-22

## 2019-05-22 NOTE — TELEPHONE ENCOUNTER
----- Message from Carissa Johnson sent at 5/22/2019  8:18 AM EDT -----  Regarding: BOOGIE-CHEMO PILL  Contact: [phone]  Patient called and she won't be starting the chemo pill until 6/3/19 because she is going on a mini vacation and will be back on 6/2/19 Dr. Jones is okay with this and she wanted Dr. Menchaca to know this.

## 2019-06-05 ENCOUNTER — DOCUMENTATION (OUTPATIENT)
Dept: GENETICS | Facility: HOSPITAL | Age: 64
End: 2019-06-05

## 2019-06-05 NOTE — PROGRESS NOTES
Naz Singh is a 63-year-old female who was seen for genetic counseling due to a personal history of breast cancer and colon cancer.  Ms. Singh was diagnosed with cervical cancer at age 38.  She was diagnosed with a breast cancer at age 47, for which she had a lumpectomy.  She was recently diagnosed with a colon cancer.  IHC showed normal staining of the MMR proteins.  She retains her uterus and ovaries.   Ms. Singh was interested in discussing her risk for a hereditary cancer syndrome, and decided to pursue genetic testing.   Ms. Singh opted to pursue comprehensive testing via the CancerNext panel ordered through [facility] which includes BRCA1/2 and 32 additional genes associated with an increased risk of breast cancer or other cancers (APC, OLIVER, BARD1, BMPR1A, BRCA1, BRCA2, BRIP1, CDH1, CDK4, CDKN2A, CHEK2, EPCAM, GREM1, MLH1, MRE11A, MSH2, MSH6, MUTYH, NBN, NF1, PALB2, PMS2, POLD1, POLE, PTEN, RAD50, RAD51C, RAD51D, SMAD4, SMARCA4, STK11, and TP53). Genetic testing was negative for mutations in BRCA1/2 and all 32 additional genes on the CancerNext Panel.  Ms. Singh was contacted with these normal results by telephone on 6/5/19.       PERTINENT FAMILY HISTORY:  Brother:  Prostate cancer, 84  Brother:  Colon cancer, 70s  Sister:   Breast cancer     Lung cancer     Kidney cancer  Sister:   Breast cancer, 56  Mother:  Breast cancer, 65  Mat. great-aunts (x2): Breast cancer    Multiple maternal and paternal female relatives have had RAIZA-BSOs in their 20-30s due to uterine fibroids.    RISK ASSESSMENT:  Ms. Singh’s personal history of early onset breast cancer raises the question of a hereditary cancer syndrome.   NCCN guidelines recommend BRCA1/2 testing for individual with breast cancer at or under age 50, if there is any additional history of breast cancer.  Therefore, genetic testing is appropriate for Ms. Singh.  Based on the extent of her personal and family history, we discussed pursuing a multigene panel that  would test for genes related to other cancer risks including colon cancer. This risk assessment is based on the family history information provided at the time of the appointment.  The assessment could change in the future should new information be obtained.     GENETIC COUNSELING: We reviewed the family history information in detail.  Cases of breast cancer follow three general patterns: sporadic, familial, and hereditary.  While most cancer is sporadic, some cases appear to occur in family clusters.  These cases are said to be familial and account for 10-20% of breast cancer cases.  Familial cases may be due to a combination of shared genes and environmental factors among family members.  In even fewer families, the cancer is said to be inherited, and the genes responsible for the cancer are known.       Family histories typical of hereditary cancer syndromes usually include multiple first- and second-degree relatives diagnosed with cancer types that define a syndrome.  These cases tend to be diagnosed at younger-than-expected ages and can be bilateral or multifocal.  The cancer in these families follows an autosomal dominant inheritance pattern, which indicates the likely presence of a mutation in a cancer susceptibility gene.  Children and siblings of an individual believed to carry this mutation have a 50% chance of inheriting that mutation, thereby inheriting the increased risk to develop cancer.  These mutations can be passed down from the maternal or the paternal lineage.     Hereditary breast cancer accounts for 5-10% of all cases of breast cancer.  A significant proportion of hereditary breast cancer can be attributed to mutations in the BRCA1 and BRCA2 genes.  Mutations in these genes confer an increased risk for breast cancer, ovarian cancer, male breast cancer, prostate cancer and pancreatic cancer.  Women with a BRCA1 or BRCA2 mutation who have already been diagnosed with breast cancer have up to a 60%  lifetime risk of a second breast cancer.  There are other genes that are known to be associated with an increased risk for breast cancer and other cancers, including colon cancer.   Based on Ms. Singh’s desire to get as much information as possible regarding her personal risks and potential risks for her family, she opted to pursue testing through a panel that would look at several other genes known to increase the risk of cancer.     GENETIC TESTING:  The risks, benefits and limitations of genetic testing and implications for clinical management following testing were reviewed.  DNA test results can influence decisions regarding screening, prevention and surgical management.  Genetic testing can have significant psychological implications for both individuals and families.  Also discussed was the possibility of insurance discrimination based on genetic test results and the laws ([place]) in place to prevent this.     We discussed panel testing, which would involve testing for BRCA1/2 as well as several other cancer susceptibility genes at the same time.  The benefits and limitations of genetic testing were discussed and Ms. Singh decided to pursue testing. The implications of a positive or negative test result were discussed. We discussed the possibility that, in some cases, genetic test results may be uninformative due to the identification of a genetic variant. These variants may or may not be associated with an increased cancer risk.  Given her personal history, a negative test result would not eliminate all breast cancer risk to her relatives, although the risk would not be as high as it would with positive genetic testing.          TEST RESULTS:  Genetic testing was negative by sequencing and deletion/duplication testing for mutations in BRCA1/2 and the additional 32 genes on the CancerNext panel.   The negative result greatly lowers the risk of a hereditary cancer syndrome.   Ms. Singh’s female relatives may still  be considered to have a somewhat increased risk for breast cancer based on the family history.  This assessment is based on the information provided at the time of the consultation.    Cancer Prevention:  Despite the negative genetic test results, Ms. Singh’s relatives may have an increased lifetime risk for cancer based on family history.      Surveillance for individuals with a high lifetime risk of breast cancer (>20%, versus the average risk of 12%), based on NCCN guidelines, would consist of semi-annual clinical breast exams and monthly self-breast exams starting by age 18 and annual mammography starting 10 years younger than the earliest diagnosis in a close relative, or starting by age 40.  According to an American Cancer Society expert panel, annual breast MRI should be offered to women whose lifetime risk of breast cancer is 20-25 percent or more, also starting by age 40 or earlier if indicated by family history.  Female relatives could have a risk assessment performed using a family history-based model, such as the Tyrer-Cuzick model, to determine their individual risks.      Per NCCN guidelines, based on her colon cancer diagnosis, first-degree relatives (including her children) should have screening colonoscopy at age 40 and repeat every 5 years or more frequently based on findings.      PLAN:  Genetic counseling remains available for Ms. Singh.  If Ms. Singh has any questions or concerns she is welcome to call us at [phone].           Itzel Elliott MS, [facility] [place], [facility]  Licensed Certified Genetic Counselor    Cc: Naz Menchaca MD

## 2019-06-12 ENCOUNTER — OFFICE VISIT (OUTPATIENT)
Dept: ONCOLOGY | Facility: CLINIC | Age: 64
End: 2019-06-12

## 2019-06-12 VITALS
TEMPERATURE: 98.6 F | DIASTOLIC BLOOD PRESSURE: 67 MMHG | HEART RATE: 83 BPM | HEIGHT: 66 IN | RESPIRATION RATE: 18 BRPM | BODY MASS INDEX: 23.78 KG/M2 | WEIGHT: 148 LBS | SYSTOLIC BLOOD PRESSURE: 119 MMHG

## 2019-06-12 DIAGNOSIS — C18.8 OVERLAPPING MALIGNANT NEOPLASM OF COLON (HCC): Primary | Chronic | ICD-10-CM

## 2019-06-12 PROCEDURE — 99213 OFFICE O/P EST LOW 20 MIN: CPT | Performed by: NURSE PRACTITIONER

## 2019-06-12 NOTE — PROGRESS NOTES
CHIEF COMPLAINT: follow up Stage IIIb colon cancer     Problem List:  Oncology/Hematology History    1.  Stage IIIB [id number] moderately differentiated colon cancer  2.   History of breast cancer 2003 treated with chemotherapy and radiation with Dr. Rivers  3.   History of cervical cancer treated with radiation in 1993    -3/25/2019 screening colonoscopy found 5.5 cm tumor at 50 cm near the splenic fracture which was tattooed.  This showed moderately differentiated adenocarcinoma and there was a separate hyperplastic polyp 5 mm at 15 cm.  -3/27/2019 CT chest abdomen pelvis showed tumor at the splenic flexure T3 with transmural invasion and no distant metastases.  Positive gallstones and hiatal hernia.  CEA 2.9 with normal CMP.  -4/3/2019 transverse colectomy to the splenic flexure with primary re-anastomosis revealed moderately differentiated adenocarcinoma extending to the serosa/mesenteric fat with 8 out of 12 lymph nodes involved the largest being 2 cm.  Margins negative.  T3N2B stage IIIb  -4/30/2019 initial medical oncology visit: I reviewed her history.  She has 3 first-degree relatives with breast cancer, thyroid cancer in her family, prostate cancer in her first-degree relatives, and has had 2 other malignancies prior to her colon cancer as outlined above.  I have spoken with Dr. Freddy Esquivel to get mismatch repair testing.  She has seen Dr. Adam Royal who, because of the extent of this next to the spleen feels that localized radiation may be reasonable while not standard.  -5/14/2019 office visit: PCR showed she was mismatch repair proficient; therefore, consider Xeloda radiation followed by 6 months of Xeloda oxaliplatin.  Coordinated this with Dr. Royal who will start radiation May 27.  Following that we will start Xeloda oxaliplatin early July.  She will take 3 tablets of Xeloda twice a day Monday through Friday with radiation.  She will get a chemotherapy education appointment with our pharmacy  doctorate's in [place].  Though mismatch repair proficient, we will still have her see genetic counseling    -6/5/2019 genetic counseling negatie    -Began radiation along with Xeloda 4/4/19  I        Overlapping malignant neoplasm of colon (CMS/HCC)    4/30/2019 Initial Diagnosis     Overlapping malignant neoplasm of colon (CMS/HCC)         6/4/2019 -  Chemotherapy/Radiation     OP COLON Capecitabine 825 mg/m2 M-F               6/5/2019 Genetic Testing      Genetic testing was negative for mutations in BRCA1/2 and all 32 additional genes on the CancerNext Panel.                 HISTORY OF PRESENT ILLNESS:  The patient is a 63 y.o. female, here for follow up on management of stage IIIb colon cancer with history of breast and cervical cancer.  She was mismatch repair proficient.  Started radiation and Xeloda 1 week ago on June 4, 2019.  Thus far the only side effects she is aware of his fatigue.      History reviewed. No pertinent past medical history.  Past Surgical History:   Procedure Laterality Date   • BREAST [facility]Y [facility]   • [place]    [facility] [place]       Allergies   Allergen Reactions   • Penicillins Nausea And Vomiting       Family History and Social History reviewed and changed as necessary      REVIEW OF SYSTEM:   Review of Systems   Constitutional: Negative for appetite change, chills, diaphoresis, fever and unexpected weight change. Positive for mild fatigue.  HENT:   Negative for mouth sores, sore throat and trouble swallowing.    Eyes: Negative for icterus.   Respiratory: Negative for cough, hemoptysis and shortness of breath.    Cardiovascular: Negative for chest pain, leg swelling and palpitations.   Gastrointestinal: Negative for abdominal distention, abdominal pain, blood in stool, constipation, diarrhea, nausea and vomiting.   Endocrine: Negative for hot flashes.   Genitourinary: Negative for bladder incontinence, difficulty urinating, dysuria, frequency  "and hematuria.    Musculoskeletal: Negative for gait problem, neck pain and neck stiffness.   Skin: Negative for rash.   Neurological: Negative for dizziness, gait problem, headaches, light-headedness and numbness.   Hematological: Negative for adenopathy. Does not bruise/bleed easily.   Psychiatric/Behavioral: Negative for depression. The patient is not nervous/anxious.    All other systems reviewed and are negative.       PHYSICAL EXAM    Vitals:    06/12/19 0901   BP: 119/67   Pulse: 83   Resp: 18   Temp: 98.6 °F (37 °C)   Weight: 67.1 kg (148 lb)   Height: 168.3 cm (66.25\")     Constitutional: Appears well-developed and well-nourished. No distress.   ECOG: (0) Fully active, able to carry on all predisease performance without restriction  HENT:   Head: Normocephalic.   Mouth/Throat: Oropharynx is clear and moist.   Eyes: Conjunctivae are normal. Pupils are equal, round, and reactive to light. No scleral icterus.   Neck: Neck supple. No JVD present. No thyromegaly present.   Cardiovascular: Normal rate, regular rhythm and normal heart sounds.    Pulmonary/Chest: Breath sounds normal. No respiratory distress.   Abdominal: Soft. Exhibits no distension and no mass. There is no hepatosplenomegaly. There is no tenderness. There is no rebound and no guarding.   Musculoskeletal:Exhibits no edema, tenderness or deformity.   Neurological: Alert and oriented to person, place, and time. Exhibits normal muscle tone.   Skin: No ecchymosis, no petechiae and no rash noted. Not diaphoretic. No cyanosis. Nails show no clubbing.   Psychiatric: Normal mood and affect.   Vitals reviewed.  Labs reviewed.    Lab Results   Component Value Date    HGB 11.9 04/30/2019    HCT 36.5 04/30/2019    MCV 91 04/30/2019     04/30/2019    WBC 5.7 04/30/2019    NEUTROABS 3.5 04/30/2019    LYMPHSABS 1.5 04/30/2019    MONOSABS 0.4 04/30/2019    EOSABS 0.2 04/30/2019    BASOSABS 0.0 04/30/2019         ASSESSMENT & PLAN:    1. Stage III colon " cancer  2. History of breast cancer  3. History of cervical cancer    Discussion:PCR showed she was mismatch repair proficient; therefore, treating with Xeloda radiation which will be followed by 6 months of Xeloda oxaliplatin.  Thus far tolerating radiation and Xeloda that began 1 week ago.  Following this we plan on Xeloda and oxaliplatin.  She is taking 3 tablets of Xeloda twice a day Monday through Friday with radiation.  Genetic testing was negative.  We will do periodic follow-up scanning every 3 months for the first year or 2 and this will all be according to NCCN guidelines for her follow-up imaging and labs and endoscopies.  We will see her back towards the end of radiation to assess for toxicities and get her set up for Xeloda and oxaliplatin.    I spent 15 minutes with the patient. I spent > 50% percent of this time counseling and discussing prognosis, diagnostic testing, evaluation, current status and management.    Itzel Curran, APRN    06/12/2019

## 2019-06-18 ENCOUNTER — LAB (OUTPATIENT)
Dept: ONCOLOGY | Facility: HOSPITAL | Age: 64
End: 2019-06-18

## 2019-06-18 VITALS
RESPIRATION RATE: 16 BRPM | SYSTOLIC BLOOD PRESSURE: 138 MMHG | TEMPERATURE: 98.1 F | WEIGHT: 146.6 LBS | HEART RATE: 83 BPM | BODY MASS INDEX: 23.48 KG/M2 | DIASTOLIC BLOOD PRESSURE: 70 MMHG

## 2019-06-18 DIAGNOSIS — C18.8 OVERLAPPING MALIGNANT NEOPLASM OF COLON (HCC): Chronic | ICD-10-CM

## 2019-06-18 PROCEDURE — 36415 COLL VENOUS BLD VENIPUNCTURE: CPT

## 2019-06-19 LAB
ALBUMIN SERPL-MCNC: 4 G/DL (ref 3.6–4.8)
ALBUMIN/GLOB SERPL: 1.6 {RATIO} (ref 1.2–2.2)
ALP SERPL-CCNC: 100 IU/L (ref 39–117)
ALT SERPL-CCNC: 14 IU/L (ref 0–32)
AST SERPL-CCNC: 18 IU/L (ref 0–40)
BASOPHILS # BLD AUTO: 0 X10E3/UL (ref 0–0.2)
BASOPHILS NFR BLD AUTO: 1 %
BILIRUB SERPL-MCNC: <0.2 MG/DL (ref 0–1.2)
BUN SERPL-MCNC: 12 MG/DL (ref 8–27)
BUN/CREAT SERPL: 20 (ref 12–28)
CALCIUM SERPL-MCNC: 9.4 MG/DL (ref 8.7–10.3)
CHLORIDE SERPL-SCNC: 102 MMOL/L (ref 96–106)
CO2 SERPL-SCNC: 23 MMOL/L (ref 20–29)
CREAT SERPL-MCNC: 0.59 MG/DL (ref 0.57–1)
EOSINOPHIL # BLD AUTO: 0.3 X10E3/UL (ref 0–0.4)
EOSINOPHIL NFR BLD AUTO: 4 %
ERYTHROCYTE [DISTWIDTH] IN BLOOD BY AUTOMATED COUNT: 14.9 % (ref 12.3–15.4)
GLOBULIN SER CALC-MCNC: 2.5 G/DL (ref 1.5–4.5)
GLUCOSE SERPL-MCNC: 76 MG/DL (ref 65–99)
HCT VFR BLD AUTO: 37.3 % (ref 34–46.6)
HGB BLD-MCNC: 11.9 G/DL (ref 11.1–15.9)
IMM GRANULOCYTES # BLD AUTO: 0 X10E3/UL (ref 0–0.1)
IMM GRANULOCYTES NFR BLD AUTO: 0 %
LYMPHOCYTES # BLD AUTO: 0.7 X10E3/UL (ref 0.7–3.1)
LYMPHOCYTES NFR BLD AUTO: 12 %
MCH RBC QN AUTO: 29.2 PG (ref 26.6–33)
MCHC RBC AUTO-ENTMCNC: 31.9 G/DL (ref 31.5–35.7)
MCV RBC AUTO: 91 FL (ref 79–97)
MONOCYTES # BLD AUTO: 0.4 X10E3/UL (ref 0.1–0.9)
MONOCYTES NFR BLD AUTO: 7 %
NEUTROPHILS # BLD AUTO: 4.5 X10E3/UL (ref 1.4–7)
NEUTROPHILS NFR BLD AUTO: 76 %
PLATELET # BLD AUTO: 385 X10E3/UL (ref 150–450)
POTASSIUM SERPL-SCNC: 4 MMOL/L (ref 3.5–5.2)
PROT SERPL-MCNC: 6.5 G/DL (ref 6–8.5)
RBC # BLD AUTO: 4.08 X10E6/UL (ref 3.77–5.28)
SODIUM SERPL-SCNC: 141 MMOL/L (ref 134–144)
WBC # BLD AUTO: 5.9 X10E3/UL (ref 3.4–10.8)

## 2019-06-25 ENCOUNTER — LAB (OUTPATIENT)
Dept: ONCOLOGY | Facility: HOSPITAL | Age: 64
End: 2019-06-25

## 2019-06-25 VITALS
SYSTOLIC BLOOD PRESSURE: 125 MMHG | HEART RATE: 77 BPM | DIASTOLIC BLOOD PRESSURE: 67 MMHG | RESPIRATION RATE: 20 BRPM | BODY MASS INDEX: 23.52 KG/M2 | TEMPERATURE: 97.8 F | WEIGHT: 146.8 LBS

## 2019-06-25 DIAGNOSIS — C18.8 OVERLAPPING MALIGNANT NEOPLASM OF COLON (HCC): Chronic | ICD-10-CM

## 2019-06-25 PROCEDURE — 36415 COLL VENOUS BLD VENIPUNCTURE: CPT

## 2019-06-26 LAB
BASOPHILS # BLD AUTO: 0 X10E3/UL (ref 0–0.2)
BASOPHILS NFR BLD AUTO: 0 %
EOSINOPHIL # BLD AUTO: 0.2 X10E3/UL (ref 0–0.4)
EOSINOPHIL NFR BLD AUTO: 4 %
ERYTHROCYTE [DISTWIDTH] IN BLOOD BY AUTOMATED COUNT: 15.4 % (ref 12.3–15.4)
HCT VFR BLD AUTO: 34.9 % (ref 34–46.6)
HGB BLD-MCNC: 11.6 G/DL (ref 11.1–15.9)
IMM GRANULOCYTES # BLD AUTO: 0 X10E3/UL (ref 0–0.1)
IMM GRANULOCYTES NFR BLD AUTO: 0 %
LYMPHOCYTES # BLD AUTO: 0.7 X10E3/UL (ref 0.7–3.1)
LYMPHOCYTES NFR BLD AUTO: 12 %
MCH RBC QN AUTO: 30.2 PG (ref 26.6–33)
MCHC RBC AUTO-ENTMCNC: 33.2 G/DL (ref 31.5–35.7)
MCV RBC AUTO: 91 FL (ref 79–97)
MONOCYTES # BLD AUTO: 0.4 X10E3/UL (ref 0.1–0.9)
MONOCYTES NFR BLD AUTO: 8 %
NEUTROPHILS # BLD AUTO: 4.4 X10E3/UL (ref 1.4–7)
NEUTROPHILS NFR BLD AUTO: 76 %
PLATELET # BLD AUTO: 310 X10E3/UL (ref 150–450)
RBC # BLD AUTO: 3.84 X10E6/UL (ref 3.77–5.28)
WBC # BLD AUTO: 5.8 X10E3/UL (ref 3.4–10.8)

## 2019-06-27 DIAGNOSIS — C18.8 OVERLAPPING MALIGNANT NEOPLASM OF COLON (HCC): Chronic | ICD-10-CM

## 2019-07-09 NOTE — PROGRESS NOTES
CHIEF COMPLAINT: Follow-up 3B colon cancer    Problem List:  Oncology/Hematology History    1.  Stage IIIB [id number] moderately differentiated colon cancer  2.   History of breast cancer 2003 treated with chemotherapy and radiation with Dr. Rivers  3.   History of cervical cancer treated with radiation in 1993    -3/25/2019 screening colonoscopy found 5.5 cm tumor at 50 cm near the splenic fracture which was tattooed.  This showed moderately differentiated adenocarcinoma and there was a separate hyperplastic polyp 5 mm at 15 cm.  -3/27/2019 CT chest abdomen pelvis showed tumor at the splenic flexure T3 with transmural invasion and no distant metastases.  Positive gallstones and hiatal hernia.  CEA 2.9 with normal CMP.  -4/3/2019 transverse colectomy to the splenic flexure with primary re-anastomosis revealed moderately differentiated adenocarcinoma extending to the serosa/mesenteric fat with 8 out of 12 lymph nodes involved the largest being 2 cm.  Margins negative.  T3N2B stage IIIb  -4/30/2019 initial medical oncology visit: I reviewed her history.  She has 3 first-degree relatives with breast cancer, thyroid cancer in her family, prostate cancer in her first-degree relatives, and has had 2 other malignancies prior to her colon cancer as outlined above.  I have spoken with Dr. Freddy Esquivel to get mismatch repair testing.  She has seen Dr. Adam Royal who, because of the extent of this next to the spleen feels that localized radiation may be reasonable while not standard.  -5/14/2019 office visit: PCR showed she was mismatch repair proficient; therefore, consider Xeloda radiation followed by 6 months of Xeloda oxaliplatin.  Coordinated this with Dr. Royal who will start radiation May 27.  Following that we will start Xeloda oxaliplatin.  She will take 3 tablets of Xeloda twice a day Monday through Friday with radiation.  She will get a chemotherapy education appointment with our pharmacy doctorate's in  [place].  Though mismatch repair proficient, we will still have her see genetic counseling    -6/5/2019 genetic counseling negative    -Began radiation along with Xeloda 6/4/19 and completed 7/9/19  I        Overlapping malignant neoplasm of colon (CMS/HCC)    4/30/2019 Initial Diagnosis     Overlapping malignant neoplasm of colon (CMS/HCC)         6/4/2019 - 7/9/2019 Chemotherapy/Radiation     OP COLON Capecitabine 825 mg/m2 M-F               6/5/2019 Genetic Testing      Genetic testing was negative for mutations in BRCA1/2 and all 32 additional genes on the CancerNext Panel.                 HISTORY OF PRESENT ILLNESS:  The patient is a 63 y.o. female, here for follow up on management of her 3B colon cancer.  Completes Xeloda radiation 7/15/19.  All in all tolerated the Xeloda radiation reasonably well.      History reviewed. No pertinent past medical history.  Past Surgical History:   Procedure Laterality Date   • BREAST LUMPECTOMY Left 2003     [place]   • [place]    [facility] [place]       Allergies   Allergen Reactions   • Penicillins Nausea And Vomiting       Family History and Social History reviewed and changed as necessary      REVIEW OF SYSTEM:   Review of Systems   Constitutional: Negative for appetite change, chills, diaphoresis, fatigue, fever and unexpected weight change.   HENT:   Negative for mouth sores, sore throat and trouble swallowing.    Eyes: Negative for icterus.   Respiratory: Negative for cough, hemoptysis and shortness of breath.    Cardiovascular: Negative for chest pain, leg swelling and palpitations.   Gastrointestinal: Negative for abdominal distention, abdominal pain, blood in stool, constipation, diarrhea, nausea and vomiting.   Endocrine: Negative for hot flashes.   Genitourinary: Negative for bladder incontinence, difficulty urinating, dysuria, frequency and hematuria.    Musculoskeletal: Negative for gait problem, neck pain and neck stiffness.   Skin: Negative  "for rash.   Neurological: Negative for dizziness, gait problem, headaches, light-headedness and numbness.   Hematological: Negative for adenopathy. Does not bruise/bleed easily.   Psychiatric/Behavioral: Negative for depression. The patient is not nervous/anxious.    All other systems reviewed and are negative.       PHYSICAL EXAM    Vitals:    07/09/19 0851   BP: 120/74   Pulse: 80   Resp: 16   Temp: 99.2 °F (37.3 °C)   SpO2: 97%   Weight: 67.6 kg (149 lb)   Height: 168.3 cm (66.25\")     Constitutional: Appears well-developed and well-nourished. No distress.   ECOG: (0) Fully active, able to carry on all predisease performance without restriction  HENT:   Head: Normocephalic.   Mouth/Throat: Oropharynx is clear and moist.   Eyes: Conjunctivae are normal. Pupils are equal, round, and reactive to light. No scleral icterus.   Neck: Neck supple. No JVD present. No thyromegaly present.   Cardiovascular: Normal rate, regular rhythm and normal heart sounds.    Pulmonary/Chest: Breath sounds normal. No respiratory distress.   Abdominal: Soft. Exhibits no distension and no mass. There is no hepatosplenomegaly. There is no tenderness. There is no rebound and no guarding.   Musculoskeletal:Exhibits no edema, tenderness or deformity.   Neurological: Alert and oriented to person, place, and time. Exhibits normal muscle tone.   Skin: No ecchymosis, no petechiae and no rash noted. Not diaphoretic. No cyanosis. Nails show no clubbing.   Psychiatric: Normal mood and affect.   Vitals reviewed.      Lab Results   Component Value Date    HGB 11.6 07/02/2019    HCT 34.9 07/02/2019    MCV 91 07/02/2019     07/02/2019    WBC 5.3 07/02/2019    NEUTROABS 4.2 07/02/2019    LYMPHSABS 0.5 (L) 07/02/2019    MONOSABS 0.3 07/02/2019    EOSABS 0.3 07/02/2019    BASOSABS 0.0 07/02/2019       Lab Results   Component Value Date    BUN 12 06/18/2019    CREATININE 0.59 06/18/2019     06/18/2019    K 4.0 06/18/2019     06/18/2019    " CO2 23 06/18/2019    CALCIUM 9.4 06/18/2019    ALBUMIN 4.0 06/18/2019    BILITOT <0.2 06/18/2019    ALKPHOS 100 06/18/2019    AST 18 06/18/2019    ALT 14 06/18/2019                   ASSESSMENT & PLAN:    1. Stage III colon cancer with local extension status post Xeloda radiation completing the radiation and Xeloda 7/15/2019.  Will get CT scans repeated to make sure there is been no progression outside of the radiation field and no metastasis and we will see her back in 3 weeks to start Xeloda oxaliplatin for 6 months.  She will need chemotherapy preparation visit.  We will scan her about every 3 months along with serial CBC CMP and CEA per routine NCCN guidelines.  She will have the chemotherapy preparation visit before start of chemo in 3 weeks and we will just start her that day in 3 weeks and see her back for her clinic visit 3 weeks after that for course #2.  My nurse practitioner will go over her CT scans and if there is metastasis I will see her before proceeding with systemic therapy.  We will get port with Dr. Hammer before start of chemotherapy.  Discussed with patient 40 minutes greater than 50% spent counseling      Zhang Menchaca MD    07/09/2019

## 2019-07-10 NOTE — PROGRESS NOTES
Oral Chemotherapy Teaching      Patient Name/:  Naz Singh   1955  Oral Chemotherapy Regimen:  Xeloda 1500mg PO BID on Days 1-14, off 7 + Oxaliplatin IV on Day 1 of a 21 Day cycle    Date Started Medication:Cycle 1: anticipate 19 as scheduled    Additional Notes:Released script for Xeloda 1500mg PO BID x 14 days, followed by 7 days off #84 with 7 RF to [facility] to begin processing. Pt scheduled for education session with [facility] in [place]. Pt with prior experience utilizing Xeloda, completed Xeloda/RT. Will mail patient updated calendar for use in combination with Oxaliplatin.

## 2019-07-25 NOTE — PROGRESS NOTES
CHEMOTHERAPY PREPARATION    Naz Singh  [id number]  1955    Chief Complaint: Chemotherapy preparation    History of present illness:  Naz Singh is a 63 y.o. year old female who is here today for chemotherapy preparation and needs assessment. The patient has been diagnosed with colon cancer and is scheduled to begin treatment with Xeloda, oxaliplatin and Avastin.     Oncology History:    Oncology/Hematology History    1.  Stage IIIB [id number] moderately differentiated colon cancer  2.   History of breast cancer 2003 treated with chemotherapy and radiation with Dr. Rivers  3.   History of cervical cancer treated with radiation in 1993    -3/25/2019 screening colonoscopy found 5.5 cm tumor at 50 cm near the splenic fracture which was tattooed.  This showed moderately differentiated adenocarcinoma and there was a separate hyperplastic polyp 5 mm at 15 cm.  -3/27/2019 CT chest abdomen pelvis showed tumor at the splenic flexure T3 with transmural invasion and no distant metastases.  Positive gallstones and hiatal hernia.  CEA 2.9 with normal CMP.  -4/3/2019 transverse colectomy to the splenic flexure with primary re-anastomosis revealed moderately differentiated adenocarcinoma extending to the serosa/mesenteric fat with 8 out of 12 lymph nodes involved the largest being 2 cm.  Margins negative.  T3N2B stage IIIb  -4/30/2019 initial medical oncology visit: I reviewed her history.  She has 3 first-degree relatives with breast cancer, thyroid cancer in her family, prostate cancer in her first-degree relatives, and has had 2 other malignancies prior to her colon cancer as outlined above.  I have spoken with Dr. Freddy Esquivel to get mismatch repair testing.  She has seen Dr. Adam Royal who, because of the extent of this next to the spleen feels that localized radiation may be reasonable while not standard.  -5/14/2019 office visit: PCR showed she was mismatch repair proficient; therefore, consider Xeloda  radiation followed by 6 months of Xeloda oxaliplatin.  Coordinated this with Dr. Royal who will start radiation May 27.  Following that we will start Xeloda oxaliplatin.  She will take 3 tablets of Xeloda twice a day Monday through Friday with radiation.  She will get a chemotherapy education appointment with our pharmacy doctorate's in [place].  Though mismatch repair proficient, we will still have her see genetic counseling    -6/5/2019 genetic counseling negative    -Began radiation along with Xeloda 6/4/19 and completed 7/15/19    -7/22/2019 CT chest abdomen and pelvis showed progression of disease with extensive peritoneal carcinomatosis  I        Overlapping malignant neoplasm of colon (CMS/HCC)    4/30/2019 Initial Diagnosis     Overlapping malignant neoplasm of colon (CMS/HCC)         6/4/2019 - 7/9/2019 Chemotherapy/Radiation     OP COLON Capecitabine 825 mg/m2 M-F               6/5/2019 Genetic Testing      Genetic testing was negative for mutations in BRCA1/2 and all 32 additional genes on the CancerNext Panel.              7/22/2019 Progression     CT chest abdomen and pelvis revealed rather extensive diffuse peritoneal carcinomatosis, at least 15-20 peritoneal nodules measuring between 1 cm in many as large as 2-3 cm scattered throughout the peritoneal cavity, significantly progressed from earlier this year.  No metastasis in the chest.  Small left pleural effusion with associated mild left bibasilar atelectasis, otherwise clear lungs.            History reviewed. No pertinent past medical history.    Past Surgical History:   Procedure Laterality Date   • BREAST LUMPECTOMY Left 2003    [facility]   • COLON SURGERY  2019    [facility] [place] [facility]       MEDICATIONS: The current medication list was reviewed and reconciled.     Allergies:  is allergic to penicillins.    Family History   Problem Relation Age of Onset   • Breast cancer Mother    • Heart disease Father    • Breast cancer Sister    • Prostate  "cancer Brother    • Colon cancer Brother    • Breast cancer Sister          Review of Systems   Constitutional: Negative for fatigue, fever and unexpected weight change.   HENT: Negative for congestion, hearing loss, sore throat and trouble swallowing.    Eyes: Negative for visual disturbance.   Respiratory: Negative for cough, shortness of breath and wheezing.    Cardiovascular: Negative for chest pain and leg swelling.   Gastrointestinal: Positive for abdominal pain. Negative for abdominal distention, constipation, diarrhea, nausea and vomiting.   Endocrine: Negative.    Genitourinary: Negative.    Musculoskeletal: Negative for arthralgias, back pain and gait problem.   Skin: Negative.    Allergic/Immunologic: Negative.    Neurological: Negative for dizziness, weakness, numbness and headaches.   Hematological: Negative for adenopathy. Does not bruise/bleed easily.   Psychiatric/Behavioral: Negative.    All other systems reviewed and are negative.      Physical Exam  Vital Signs: /62   Pulse 74   Temp 98.6 °F (37 °C)   Resp 16   Ht 168.3 cm (66.25\")   Wt 67.1 kg (148 lb)   BMI 23.71 kg/m²    General Appearance:  alert, cooperative, no apparent distress, appears stated age and normal weight   Neurologic/Psychiatric: A&O x 3, gait steady, appropriate affect   HEENT:  Normocephalic, without obvious abnormality, mucous membranes moist   Lungs:   Clear to auscultation bilaterally; respirations regular, even, and unlabored bilaterally   Heart:  Regular rate and rhythm, no murmurs appreciated   Extremities: Normal, atraumatic; no clubbing, cyanosis, or edema    Skin: No rashes, lesions, or abnormal coloration noted     ECOG Performance Status: (0) Fully active, able to carry on all predisease performance without restriction          NEEDS ASSESSMENTS    Genetics  The patient's new diagnosis and family history have been reviewed for genetic counseling needs. A genetic referral is recommended.  Patient has had " genetic testing that returned negative with cancer next panel.    Psychosocial  The patient has completed a PHQ-9 Depression Screening and the Distress Thermometer (DT) today.   PHQ-9 results show 5-9 (Mild Depression). The patient scored their distress today as 2 on a scale of 0-10 with 0 being no distress and 10 being extreme distress.   Problems marked by the patient as being an issue for them within the last week include physical problems.   Results were reviewed along with psychosocial resources offered by our cancer center. Our oncology social worker will be flagged for a DT score of 4 or above, and a same day call will be made for a score of 9 or 10. A mental health referral is not recommended at this time. The patient is not accepting of a referral to [facility].   Copies of patient's questionnaires will be scanned into EMR for details and further reference.    Barriers to care  A barriers form was also completed by the patient today. We discussed services offered by our facility to help her have adequate access to care. The patient was given the name and card for our Oncology Social Worker, Fern Brock. Based upon barriers assessment today, the patient will not require a follow-up call from the  to further discuss needs.   A copy of the barriers form will also be scanned into EMR for details and further reference.     VAD Assessment  The patient and I discussed planned intervenous chemotherapy as well as other IV treatments that are often needed throughout the course of treatment. These may include, but are not limited to blood transfusions, antibiotics, and IV hydration. The vasculature does not appear to be adequate for multiple peripheral IVs throughout their treatment course. Discussed risks and benefits of VADs. The patient would like to pursue Port-A-Cath insertion prior to initiation of treatment.     Advance Care Planning   Advance Care Planning Discussion:  Advanced Care  "Planning  The patient and I discussed advanced care planning, \"Conversations that Matter\".   This service was offered, free of charge, for development of advance directives with a certified ACP facilitator.  The patient does not have an up-to-date advanced directive. This document is not on file with our office. The patient is interested in an appointment with one of our facilitators to create or update their advanced directives.              Palliative Care  The patient and I discussed palliative care services. Palliative care is not the same as Hospice care. This is specialized medical care for people living with serious illness with the goal of improving quality of life for the patient and their family. [facility] has partnered with [facility] to offer our patients outpatient palliative care early along with their treatment to assist in coordination of care, symptom management, pain management, and medical decision making.  Oncology criteria for palliative care referral is not met at this time. The patient is not interested in a palliative care consultation.     Additional Referral needs  none      CHEMOTHERAPY EDUCATION    Booklets Given: Chemotherapy and You [x]  Eating Hints [x]    Sexuality/Fertility Books []      Chemotherapy/Biotherapy Education Sheets: (list all that apply)  nausea management, acid reflux management, diarrhea management, Cancer resourse contacts information, skin and mouth care and vaccination information                                                                                                                                                                 Chemotherapy Regimen:   Treatment Plans     Name Type Plan dates Plan Provider         Active    OP COLON CapeOX+ Bevacizumab (Capecitabine / OXALIplatin / Bevacizumab) ONCOLOGY TREATMENT  8/7/2019 - Present Zhang Menchaca MD                    TOPICS EDUCATION PROVIDED COMMENTS   ANEMIA:  role of RBC, cause, s/s, ways to " manage, role of transfusion [x]    THROMBOCYTOPENIA:  role of platelet, cause, s/s, ways to prevent bleeding, things to avoid, when to seek help [x]    NEUTROPENIA:  role of WBC, cause, infection precautions, s/s of infection, when to call MD [x]    NUTRITION & APPETITE CHANGES:  importance of maintaining healthy diet & weight, ways to manage to improve intake, dietary consult, exercise regimen [x]    DIARRHEA:  causes, s/s of dehydration, ways to manage, dietary changes, when to call MD [x]    CONSTIPATION:  causes, ways to manage, dietary changes, when to call MD [x]    NAUSEA & VOMITING:  cause, use of antiemetics, dietary changes, when to call MD [x]    MOUTH SORES:  causes, oral care, ways to manage [x]    ALOPECIA:  cause, ways to manage, resources [x]    INFERTILITY & SEXUALITY:  causes, fertility preservation options, sexuality changes, ways to manage, importance of birth control []    NERVOUS SYSTEM CHANGES:  causes, s/s, neuropathies, cognitive changes, ways to manage [x]    PAIN:  causes, ways to manage [x] ????   SKIN & NAIL CHANGES:  cause, s/s, ways to manage [x]    ORGAN TOXICITIES:  cause, s/s, need for diagnostic tests, labs, when to notify MD [x]    HOME CARE:  use of spill kits, storing of PO chemo, how to manage bodily fluids [x]    MISCELLANEOUS:  drug interactions, administration, vesicant, et [x]        Assessment and Plan:    Diagnoses and all orders for this visit:    Overlapping malignant neoplasm of colon (CMS/HCC)  -     CBC and Differential; Future  -     Comprehensive metabolic panel; Future  -     Urinalysis without microscopic (no culture) - Urine, Clean Catch; Future  -     Lab Appointment Request; Future  -     Clinic Appointment Request; Future  -     Infusion Appointment Request 7; Future  -     CBC and Differential; Future  -     Comprehensive metabolic panel; Future  -     Urinalysis without microscopic (no culture) - Urine, Clean Catch; Future  -     Lab Appointment Request;  Future  -     Clinic Appointment Request; Future  -     Infusion Appointment Request 7; Future    Other orders  -     sodium chloride 0.9 % infusion 250 mL  -     palonosetron (ALOXI) injection 0.25 mg  -     dexamethasone (DECADRON) 12 mg in sodium chloride 0.9 % IVPB  -     bevacizumab (AVASTIN) 500 mg in sodium chloride 0.9 % 120 mL chemo IVPB  -     dextrose (D5W) 5 % infusion 250 mL  -     OXALIplatin (ELOXATIN) 230 mg in dextrose (D5W) 5 % 296 mL chemo IVPB  -     hydrocortisone sodium succinate (Solu-CORTEF) injection 100 mg  -     diphenhydrAMINE (BENADRYL) injection 50 mg  -     famotidine (PEPCID) injection 20 mg        This was a 60 minute face-to-face visit with 55 minutes spent in  counseling and coordination of care as documented above.   The patient and I have reviewed their new cancer diagnosis and scheduled treatment plan. Needs assessment was completed including genetics, psychosocial needs, barriers to care, VAD evaluation, advanced care planning, and palliative care services. Referrals have been ordered as appropriate based upon our evaluation and patient desires.     Chemotherapy teaching was also completed today as documented above. Adequate time was given to answer all questions to her satisfaction. Patient and family are aware of their care team members and contact information if they have questions or problems throughout the treatment course. Needs assessments and education has been completed. The patient is adequately prepared to begin treatment as scheduled.     We reviewed the patient's CT scans from 7/22/2019, unfortunately there has been progression of disease with rather extensive diffuse peritoneal carcinomatosis.  I had a long discussion with the patient and her  today that her disease is now considered metastatic, stage IV.  They understand the palliative nature of treatment.  She had originally discussed therapy with Xeloda and oxaliplatin adjuvantly with Dr. Menchaca however  she understands in light of these new and progressive findings on CT scans therapy plan has changed.  We have now added Avastin.  She is scheduled for port placement.  We will see her back in a few weeks to begin treatment, she pushed this out just a little bit as she had a reunion to attend in early August.    Itzel Curran, APRN  07/24/2019

## 2019-07-25 NOTE — TELEPHONE ENCOUNTER
Patient called triage line stating that she was experiencing some nausea and stomach discomfort. Patient is eating and drinking and having regular BM's. Patient asked if she was taking Zofran as prescribed. Patient has not been taking. Instructed to being taking Zofran as ordered. Patient to call back if this does not help. Verbalized understanding.

## 2019-07-26 NOTE — TELEPHONE ENCOUNTER
WILL mailed a [place] living will packet to pt with contact information for completion and filing in medical record.  SW available for ongoing support and resource needs.

## 2019-07-30 NOTE — PROGRESS NOTES
CHIEF COMPLAINT: Peritoneal carcinomatosis    Problem List:  Oncology/Hematology History    1.  Stage IIIB [id number] moderately differentiated colon cancer  2.   History of breast cancer 2003 treated with chemotherapy and radiation with Dr. Rivers  3.   History of cervical cancer treated with radiation in 1993    -3/25/2019 screening colonoscopy found 5.5 cm tumor at 50 cm near the splenic fracture which was tattooed.  This showed moderately differentiated adenocarcinoma and there was a separate hyperplastic polyp 5 mm at 15 cm.  -3/27/2019 CT chest abdomen pelvis showed tumor at the splenic flexure T3 with transmural invasion and no distant metastases.  Positive gallstones and hiatal hernia.  CEA 2.9 with normal CMP.  -4/3/2019 transverse colectomy to the splenic flexure with primary re-anastomosis revealed moderately differentiated adenocarcinoma extending to the serosa/mesenteric fat with 8 out of 12 lymph nodes involved the largest being 2 cm.  Margins negative.  T3N2B stage IIIb  -4/30/2019 initial medical oncology visit: I reviewed her history.  She has 3 first-degree relatives with breast cancer, thyroid cancer in her family, prostate cancer in her first-degree relatives, and has had 2 other malignancies prior to her colon cancer as outlined above.  I have spoken with Dr. Freddy Esquivel to get mismatch repair testing.  She has seen Dr. Adam Royal who, because of the extent of this next to the spleen feels that localized radiation may be reasonable while not standard.  -5/14/2019 office visit: PCR showed she was mismatch repair proficient; therefore, consider Xeloda radiation followed by 6 months of Xeloda oxaliplatin.  Coordinated this with Dr. Royal who will start radiation May 27.  Following that we will start Xeloda oxaliplatin.  She will take 3 tablets of Xeloda twice a day Monday through Friday with radiation.  She will get a chemotherapy education appointment with our pharmacy doctorate's in  [place].  Though mismatch repair proficient, we will still have her see genetic counseling    -6/5/2019 genetic counseling negative    -Began radiation along with Xeloda 6/4/19 and completed 7/15/19    -7/22/2019 CT chest abdomen and pelvis showed progression of disease with extensive peritoneal carcinomatosis    -7/30/2019 office visit: I reviewed the results and images of her CT chest abdomen pelvis showing peritoneal carcinomatosis.  July 9 CEA elevated at 8.4.  Discussed with patient the plan to continue with the Xeloda oxaliplatin but to add Avastin.  Spoken with surgical oncologist at [facility] during a meeting who performs HIPEC.  He suggested primary chemotherapy and if get a response then send to surgeon accordingly.  I plan to give her Avastin Xeloda oxaliplatin and send strata as this is acting like a BRAF mutated cancer.  If that turns out to be the case, I will switch her to cetuximab, Encorafenib, and Binimetinib.  If we get a peritoneal response without distant metastatic visceral disease of the liver or lung appearing, then I will get her to Dr. Sims at the [facility] for consideration of peritoneal debulking with PCI scoring and possible HI PEC.  She understands the palliative nature of our attempts at this junction.  I        Overlapping malignant neoplasm of colon (CMS/HCC)    4/30/2019 Initial Diagnosis     Overlapping malignant neoplasm of colon (CMS/HCC)         6/4/2019 - 7/9/2019 Chemotherapy/Radiation     OP COLON Capecitabine 825 mg/m2 M-F               6/5/2019 Genetic Testing      Genetic testing was negative for mutations in BRCA1/2 and all 32 additional genes on the CancerNext Panel.              7/22/2019 Progression     CT chest abdomen and pelvis revealed rather extensive diffuse peritoneal carcinomatosis, at least 15-20 peritoneal nodules measuring between 1 cm in many as large as 2-3 cm scattered throughout the peritoneal cavity, significantly progressed from  earlier this year.  No metastasis in the chest.  Small left pleural effusion with associated mild left bibasilar atelectasis, otherwise clear lungs.            HISTORY OF PRESENT ILLNESS:  The patient is a 63 y.o. female, here for follow up on management of colon cancer now with peritoneal carcinomatosis.  Having increased belching and decreased diaphragmatic excursion with some difficulty taking deep breaths.  No respiratory distress.      History reviewed. No pertinent past medical history.  Past Surgical History:   Procedure Laterality Date   • BREAST LUMPECTOMY Left 2003    [facility]   • COLON SURGERY  2019    [facility] Dr. Hammer       Allergies   Allergen Reactions   • Penicillins Nausea And Vomiting       Family History and Social History reviewed and changed as necessary      REVIEW OF SYSTEM:   Review of Systems   Constitutional: Negative for appetite change, chills, diaphoresis, fatigue, fever and unexpected weight change.   HENT:   Negative for mouth sores, sore throat and trouble swallowing.    Eyes: Negative for icterus.   Respiratory: Negative for cough, hemoptysis and shortness of breath.    Cardiovascular: Negative for chest pain, leg swelling and palpitations.   Gastrointestinal: Negative for abdominal distention, abdominal pain, blood in stool, constipation, diarrhea, nausea and vomiting.   Endocrine: Negative for hot flashes.   Genitourinary: Negative for bladder incontinence, difficulty urinating, dysuria, frequency and hematuria.    Musculoskeletal: Negative for gait problem, neck pain and neck stiffness.   Skin: Negative for rash.   Neurological: Negative for dizziness, gait problem, headaches, light-headedness and numbness.   Hematological: Negative for adenopathy. Does not bruise/bleed easily.   Psychiatric/Behavioral: Negative for depression. The patient is not nervous/anxious.    All other systems reviewed and are negative.       PHYSICAL EXAM    Vitals:    07/30/19 0746   BP: 178/80  "  Pulse: (!) 126   Resp: 18   Temp: 98.8 °F (37.1 °C)   Weight: 67.1 kg (148 lb)   Height: 168.3 cm (66.25\")     Constitutional: Appears well-developed and well-nourished. No distress.   ECOG: (0) Fully active, able to carry on all predisease performance without restriction  HENT:   Head: Normocephalic.   Mouth/Throat: Oropharynx is clear and moist.   Eyes: Conjunctivae are normal. Pupils are equal, round, and reactive to light. No scleral icterus.   Neck: Neck supple. No JVD present. No thyromegaly present.   Cardiovascular: Normal rate, regular rhythm and normal heart sounds.    Pulmonary/Chest: Breath sounds normal. No respiratory distress.   Abdominal: Soft. Exhibits no distension and no mass. There is no hepatosplenomegaly. There is no tenderness. There is no rebound and no guarding.   Musculoskeletal:Exhibits no edema, tenderness or deformity.   Neurological: Alert and oriented to person, place, and time. Exhibits normal muscle tone.   Skin: No ecchymosis, no petechiae and no rash noted. Not diaphoretic. No cyanosis. Nails show no clubbing.   Psychiatric: Normal mood and affect.   Vitals reviewed.      Lab Results   Component Value Date    HGB 11.4 07/09/2019    HCT 35.3 07/09/2019    MCV 92 07/09/2019     07/09/2019    WBC 6.3 07/09/2019    NEUTROABS 4.9 07/09/2019    LYMPHSABS 0.3 (L) 07/09/2019    MONOSABS 0.5 07/09/2019    EOSABS 0.5 (H) 07/09/2019    BASOSABS 0.0 07/09/2019       Lab Results   Component Value Date    BUN 9 07/09/2019    CREATININE 0.57 07/09/2019     07/09/2019    K 3.8 07/09/2019     07/09/2019    CO2 23 07/09/2019    CALCIUM 9.1 07/09/2019    ALBUMIN 3.9 07/09/2019    BILITOT 0.2 07/09/2019    ALKPHOS 98 07/09/2019    AST 17 07/09/2019    ALT 20 07/09/2019                   ASSESSMENT & PLAN:    1. Stage IIIB [id number] moderately differentiated colon cancer status post adjuvant james-splenic radiation Xeloda with plans for adjuvant Xeloda oxaliplatin but with subsequent " development of peritoneal carcinomatosis before that could start  2.   History of breast cancer 2003 treated with chemotherapy and radiation with Dr. Rivers  3.   History of cervical cancer treated with radiation in 1993    Discussion: I reviewed the results and images of her CT chest abdomen pelvis showing peritoneal carcinomatosis.  July 9 CEA elevated at 8.4.  Discussed with patient the plan to continue with the Xeloda oxaliplatin but to add Avastin.  Spoken with surgical oncologist at [facility] during a meeting who performs HIPEC.  He suggested primary chemotherapy and if get a response then send to surgeon accordingly.  I plan to give her Avastin Xeloda oxaliplatin and send strata as this is acting like a BRAF mutated cancer.  If that turns out to be the case, I will switch her to cetuximab, Encorafenib, and Binimetinib.  If we get a peritoneal response without distant metastatic visceral disease of the liver or lung appearing, then I will get her to Dr. Sims at the [facility] for consideration of peritoneal debulking with PCI scoring and possible HI PEC.  She understands the palliative nature of our attempts at this junction.  I will get strata testing to look for next generation sequencing alternatives including BRAF mutation.  I discussed with patient and  the complexity of this decision tree and the palliative nature of our intent as well as reiterating the side effects of the above treatments.  I discussed with patient face-to-face 30 minutes greater than 50% spent counseling regarding complexity of her care.  She had thought about delaying her treatment until after she goes to [facility] to a family outing but we will move this up to next week giving her increasing symptoms of eructation and abdominal fullness with decreased diaphragmatic excursion symptoms.  We will see her back 3 weeks after that and Dr. Hammer is getting port placed.      Zhang Menchaca,  MD    07/30/2019

## 2019-08-02 NOTE — TELEPHONE ENCOUNTER
Xeloda prescription remains ready to dispense from [facility]. Pt due to start treatment on 8/8/19 per currently scheduled appts. Called to alert patient Xeldoa medication ready for . No answer 8/1 or 8/2/19. LVM with call back number

## 2019-08-05 NOTE — PROGRESS NOTES
Oral Chemotherapy Teaching      Patient Name/:  Naz Singh   1955  Oral Chemotherapy Regimen:  Xeloda 1500mg PO BID on Days 1-14, off 7 + Oxaliplatin IV on Day 1 of a 21 Day cycle + Avastin on Day 1 of each 21 Day cycle    Date Started Medication:Cycle 1: anticipate 19 as scheduled    Additional Notes:Pt returned call to report her spouse will stop by [employer] this afternoon to  script for Xeloda to start treatment this week with scheduled infusion. Plans to request mail out in future, but will  first script in person.

## 2019-08-05 NOTE — TELEPHONE ENCOUNTER
Patient called to report her spouse will be in on Weds to  Xeloda. Also reports she is using Advil 400mg PO every 6 hours as need for pain. Reports some abdominal pain, but notes it is best controlled with her over the counter Advil. Reviewed labs for renal function and PLT count, within normal limits.

## 2019-08-07 PROBLEM — Z45.2 ENCOUNTER FOR CARE RELATED TO VASCULAR ACCESS PORT: Status: ACTIVE | Noted: 2019-01-01

## 2019-08-07 PROBLEM — Z45.2 ENCOUNTER FOR VENOUS ACCESS DEVICE CARE: Status: ACTIVE | Noted: 2019-01-01

## 2019-08-08 NOTE — TELEPHONE ENCOUNTER
----- Message from Lynda Doty RN sent at 8/8/2019 12:48 PM EDT -----  Regarding: Dr. Menchaca - Home medication concern  Patient reports taking Ibuprofen multiple times daily for pain relief. Asked patient if she has tried Tylenol based products for relief. Patient reports Tylenol not effective. Concerned about taking Ibuprofen multiple times daily with Avastin and Oxaliplatin therapy. Please advise. Thanks, Lynda CHAVARRIA ([place]).

## 2019-08-08 NOTE — TELEPHONE ENCOUNTER
Called patient and left message that after discussing with Yael in Pharmacy on taking ibuprofen several times a day for pain management.  Reported that max daily dose is 3,200 mg.  Asked patient to return call to clinic to discuss what type of pain she is having, where the pain is, and what she rates her pain.

## 2019-08-09 NOTE — TELEPHONE ENCOUNTER
Patient returned call with more details with pain that she is having and taking ibuprofen for.  Patient reports that she take ibuprofen for her long term headaches and that it is the only medication that does help ease the pain of her headaches.  Patient reports that she only takes ibuprofen when needed and she does take it with food.  Educated the max dose of 3200 mg q day and patient verbalized understanding.

## 2019-08-14 NOTE — TELEPHONE ENCOUNTER
Spoke with patient. Ok to go to party.  Advised to avoid anyone who is obviously sick and wash her hand frequently.  Verbalized understanding.

## 2019-08-14 NOTE — TELEPHONE ENCOUNTER
----- Message from Carissa Johnson sent at 8/14/2019  9:50 AM EDT -----  Regarding: BOOGIE-QUESTION  Contact: [phone]  Patient wants to know if it is okay for her to go to her grandsons Birthday this Saturday she had treatment last Thursday?

## 2019-08-22 NOTE — TELEPHONE ENCOUNTER
Returned call to patient. Pt reports missing two doses of Xeloda over the past 14 days. Originally due to start 7 days off this morning 8/22/19, however took make up dose of Xeloda this AM and now reporting dizziness. Discussed with patient. Will plan to administer a dose of zofran. Will hold on PM dose. Currently on first of 7 day off. Cycle 2 due to start on 8/29/19. Pt requesting to pickup refill of Xeloda from [facility] early next week. Will alert [facility] to process medication for patient . Pt appreciative of telephone follow-up.     --- Message from Erin Austin RN sent at 8/22/2019  1:21 PM EDT -----  Regarding: FW: BOOGIE - CONCERNS ABOUT SIDE EFFECTS   Contact: [phone]      ----- Message -----  From: Casi Vázquez  Sent: 8/22/2019  12:47 PM  To: e [facility] Nurse Pool  Subject: BOOGIE - CONCERNS ABOUT SIDE EFFECTS              PATIENT LEFT A VOICEMAIL STATING SHE HAS QUESTIONS ABOUT CONCERNS ABOUT POSSIBLE SIDE EFFECTS WITH THE ORAL CHEMOTHERAPY.

## 2019-08-26 NOTE — TELEPHONE ENCOUNTER
Patient called to report to Dr. Menchaca issues she had with last treatment.  HA, dizziness, fatigue, aches, and changes in taste.  She is concerned she will experience the same issues with the next cycle on Thursday.  Denies fever, nausea, or diarrhea.  Advised patient we have a triage nurse and symptom clinic she can call anytime she is experiencing side effects.  Dr. Menchaca and Itzel notified.  Advised she will have the opportunity to also discuss in person with Itzel during office visit before she start this cycle on Thursday.  Verbalized understanding.

## 2019-08-29 NOTE — PROGRESS NOTES
CHIEF COMPLAINT: Metastatic colon cancer with peritoneal carcinomatosis    Problem List:  Oncology/Hematology History    1.  Stage IIIB [id number] moderately differentiated colon cancer  2.   History of breast cancer 2003 treated with chemotherapy and radiation with Dr. Rivers  3.   History of cervical cancer treated with radiation in 1993    -3/25/2019 screening colonoscopy found 5.5 cm tumor at 50 cm near the splenic fracture which was tattooed.  This showed moderately differentiated adenocarcinoma and there was a separate hyperplastic polyp 5 mm at 15 cm.  -3/27/2019 CT chest abdomen pelvis showed tumor at the splenic flexure T3 with transmural invasion and no distant metastases.  Positive gallstones and hiatal hernia.  CEA 2.9 with normal CMP.  -4/3/2019 transverse colectomy to the splenic flexure with primary re-anastomosis revealed moderately differentiated adenocarcinoma extending to the serosa/mesenteric fat with 8 out of 12 lymph nodes involved the largest being 2 cm.  Margins negative.  T3N2B stage IIIb  -4/30/2019 initial medical oncology visit: I reviewed her history.  She has 3 first-degree relatives with breast cancer, thyroid cancer in her family, prostate cancer in her first-degree relatives, and has had 2 other malignancies prior to her colon cancer as outlined above.  I have spoken with Dr. Freddy Esquivel to get mismatch repair testing.  She has seen Dr. Adam Royal who, because of the extent of this next to the spleen feels that localized radiation may be reasonable while not standard.  -5/14/2019 office visit: PCR showed she was mismatch repair proficient; therefore, consider Xeloda radiation followed by 6 months of Xeloda oxaliplatin.  Coordinated this with Dr. Royal who will start radiation May 27.  Following that we will start Xeloda oxaliplatin.  She will take 3 tablets of Xeloda twice a day Monday through Friday with radiation.  She will get a chemotherapy education appointment with our  pharmacy doctorate's in [place].  Though mismatch repair proficient, we will still have her see genetic counseling    -6/5/2019 genetic counseling negative    -Began radiation along with Xeloda 6/4/19 and completed 7/15/19    -7/22/2019 CT chest abdomen and pelvis showed progression of disease with extensive peritoneal carcinomatosis    -7/30/2019 office visit: I reviewed the results and images of her CT chest abdomen pelvis showing peritoneal carcinomatosis.  July 9 CEA elevated at 8.4.  Discussed with patient the plan to continue with the Xeloda oxaliplatin but to add Avastin.  Spoken with surgical oncologist at [facility] during a meeting who performs HIPEC.  He suggested primary chemotherapy and if get a response then send to surgeon accordingly.  I plan to give her Avastin Xeloda oxaliplatin and send strata as this is acting like a BRAF mutated cancer.  If that turns out to be the case, I will switch her to cetuximab, Encorafenib, and Binimetinib.  If we get a peritoneal response without distant metastatic visceral disease of the liver or lung appearing, then I will get her to Dr. Sims at the [facility] for consideration of peritoneal debulking with PCI scoring and possible HI PEC.  She understands the palliative nature of our attempts at this junction.  I        Overlapping malignant neoplasm of colon (CMS/HCC)    4/30/2019 Initial Diagnosis     Overlapping malignant neoplasm of colon (CMS/HCC)         6/4/2019 - 7/9/2019 Chemotherapy/Radiation     OP COLON Capecitabine 825 mg/m2 M-F               6/5/2019 Genetic Testing      Genetic testing was negative for mutations in BRCA1/2 and all 32 additional genes on the CancerNext Panel.              7/22/2019 Progression     CT chest abdomen and pelvis revealed rather extensive diffuse peritoneal carcinomatosis, at least 15-20 peritoneal nodules measuring between 1 cm in many as large as 2-3 cm scattered throughout the peritoneal cavity,  significantly progressed from earlier this year.  No metastasis in the chest.  Small left pleural effusion with associated mild left bibasilar atelectasis, otherwise clear lungs.         8/8/2019 -  Chemotherapy     OP COLON CapeOX+ Bevacizumab (Capecitabine / OXALIplatin / Bevacizumab)              HISTORY OF PRESENT ILLNESS:  The patient is a 64 y.o. female, here for follow up on management of Metastatic colon cancer with peritoneal carcinomatosis.  The patient tolerated her first cycle of chemotherapy fairly well.  She states that she felt fatigued for several days after her treatment but this past week is felt more like herself.  She has been able to get out and about and do the things important to her.  No fevers or chills.  She did have one day where she states that she had dizziness that lasted for several hours about a week ago, no other associated symptoms and no recurrence.  She denies any mouth sores, no diarrhea, no skin changes.  Appetite is fair.  Had expected cold intolerance for 4-5 days, no lingering neuropathy.      History reviewed. No pertinent past medical history.  Past Surgical History:   Procedure Laterality Date   • BREAST LUMPECTOMY Left 2003    [facility]   • COLON SURGERY  2019    [facility] Dr. Hammer       Allergies   Allergen Reactions   • Penicillins Nausea And Vomiting       Family History and Social History reviewed and changed as necessary      REVIEW OF SYSTEM:   Review of Systems   Constitutional: Negative for appetite change, chills, diaphoresis, fever and unexpected weight change.  Positive for fatigue.  HENT:   Negative for mouth sores, sore throat and trouble swallowing.    Eyes: Negative for icterus.   Respiratory: Negative for cough, hemoptysis and shortness of breath.    Cardiovascular: Negative for chest pain, leg swelling and palpitations.   Gastrointestinal: Negative for abdominal distention, blood in stool, constipation, diarrhea, nausea and vomiting.  Positive for  "intermittent abdominal pain.  Endocrine: Negative for hot flashes.   Genitourinary: Negative for bladder incontinence, difficulty urinating, dysuria, frequency and hematuria.    Musculoskeletal: Negative for gait problem, neck pain and neck stiffness.   Skin: Negative for rash.   Neurological: Negative for dizziness, gait problem, headaches, light-headedness and numbness.   Hematological: Negative for adenopathy. Does not bruise/bleed easily.   Psychiatric/Behavioral: Negative for depression. The patient is not nervous/anxious.    All other systems reviewed and are negative.       PHYSICAL EXAM    Vitals:    08/29/19 1019   BP: 127/66   Pulse: 73   Resp: 18   Temp: 97.9 °F (36.6 °C)   Weight: 66.7 kg (147 lb)   Height: 168.3 cm (66.25\")     Constitutional: Appears well-developed and well-nourished. No distress.   ECOG: (1) Restricted in physically strenuous activity, ambulatory and able to do work of light nature  HENT:   Head: Normocephalic.   Mouth/Throat: Oropharynx is clear and moist.   Eyes: Conjunctivae are normal. Pupils are equal, round, and reactive. No scleral icterus.   Neck: Neck supple. No JVD present.    Cardiovascular: Normal rate, regular rhythm and normal heart sounds.    Pulmonary/Chest: Breath sounds normal. No respiratory distress.   Abdominal: Soft. Exhibits no distension. There is no tenderness. There is no rebound and no guarding.   Musculoskeletal:Exhibits no edema, tenderness or deformity.   Neurological: Alert and oriented to person, place, and time. Exhibits normal muscle tone.   Skin: No ecchymosis, no petechiae and no rash noted. Not diaphoretic. No cyanosis.   Psychiatric: Normal mood and affect.   Vitals reviewed.  CBC 8/29/2019 WBC 4100, hemoglobin 11, hematocrit 34.4%, platelet count 312,000, ANC 3000.    Lab Results   Component Value Date    HGB 10.9 (L) 08/07/2019    HCT 32.2 (L) 08/07/2019    MCV 90 08/07/2019     (H) 08/07/2019    WBC 9.0 08/07/2019    NEUTROABS 6.6 " 08/07/2019    LYMPHSABS 0.9 08/07/2019    MONOSABS 0.6 08/07/2019    EOSABS 1.0 (H) 08/07/2019    BASOSABS 0.1 08/07/2019       Lab Results   Component Value Date    BUN 14 08/28/2019    CREATININE 0.68 08/28/2019     08/28/2019    K 4.1 08/28/2019     08/28/2019    CO2 25 08/28/2019    CALCIUM 9.4 08/28/2019    ALBUMIN 4.1 08/28/2019    BILITOT <0.2 08/28/2019    ALKPHOS 121 (H) 08/28/2019    AST 31 08/28/2019    ALT 36 (H) 08/28/2019         ASSESSMENT & PLAN:    1.  Stage IIIB [id number] moderately differentiated colon cancer status post adjuvant james-splenic radiation Xeloda with plans for adjuvant Xeloda oxaliplatin but with subsequent development of peritoneal carcinomatosis before that could start  2.   History of breast cancer 2003 treated with chemotherapy and radiation with Dr. Rivers  3.   History of cervical cancer treated with radiation in 1993  4.  Fatigue with chemotherapy  5.  Episode of dizziness    Discussion: The patient tolerated first cycle of XELOX plus Avastin with no significant side effects.  She has her Xeloda and is ready to start cycle 2 today.  We had to have her go to [facility] earlier today to repeat her CBC as somehow that did not get processed yesterday, results are shown above and are adequate for treatment today.  We will continue unchanged.  We will see her back in 3 weeks for follow-up.  I asked her to let us know if she had any further episodes of dizziness or other concerns prior to return.  We will plan on repeating scans probably after her third course.  I did call to see if the results of her strata testing were available however they have not resulted yet.    As outlined in Dr. Menchaca's note dated 7/30/2019 if we get a response to her peritoneal disease with chemotherapy without distant metastatic visceral disease of the liver or lung appearing then we would get her to Dr. Sims at the [facility] for consideration of peritoneal debulking and  possible HIPEC.  Also if strata testing returns with any actionable target such as B raft mutation then we would switch therapy, if B raft mutated would switch her to cetuximab, encorafenib, and binimetinib.    I spent 15 minutes with the patient. I spent > 50% percent of this time counseling and discussing prognosis, diagnostic testing, evaluation, current status and management.    Itzel Curran, APRN    08/29/2019

## 2019-09-06 NOTE — PROGRESS NOTES
Oncology Nutrition Screening    Patient Name:  Naz Singh  YOB: 1955  MRN: [id number]  Date:  09/06/19  Physician:  Dr. Menchaca    Type of Cancer Treatment:   Surgery: Transverse colectomy (4/3/19)  Radiation/Cyberknife: Number of Treatments:  25 (6/4/19 - 7/15/19)  Chemotherapy: Xeloda - po bid(D1-14) / Oxaliplatin(D1) / Avastin(D1) - every 21 days     Patient Active Problem List   Diagnosis   • Overlapping malignant neoplasm of colon (CMS/HCC)   • Encounter for care related to vascular access port   • Encounter for venous access device care       Current Outpatient Medications   Medication Sig Dispense Refill   • capecitabine (XELODA) 500 MG chemo tablet Take 3 tab(s) by mouth in the AM and 3 tab(s) by mouth in the PM on days 1-14, then off for 7 days. 84 tablet 7   • Cholecalciferol (VITAMIN D3) 5000 units capsule capsule Take 5,000 Units by mouth Daily.     • ibuprofen (ADVIL,MOTRIN) 400 MG tablet Take 400 mg by mouth 2 (Two) Times a Day.     • lidocaine-prilocaine (EMLA) 2.5-2.5 % cream Apply  topically to the appropriate area as directed As Needed (45-60 minutes prior to port access.  Cover with saran/plastic wrap.). 30 g 3   • Multiple Vitamins-Minerals (MULTIVITAMIN ADULT PO) Take  by mouth.     • ondansetron (ZOFRAN) 8 MG tablet Take 1 tablet by mouth 3 (Three) Times a Day As Needed for Nausea or Vomiting. 30 tablet 5     No current facility-administered medications for this visit.        Glycemic Risk:   n/a    Weight:   Height: 66 inches  Weight: 147 lbs.  Usual Body Weight: ~148 lbs.   BMI: 23.6  Normal  Weight has been stable    Oral Food Intake:  Regular Diet - No Restrictions    Hydration Status:   How many 8 ounce glass of water of fluid do you drink per day?  Not assessed at this time.    Enteral Feeding:   n/a    Nutrition Symptoms:   No nutritional symptoms noted.    Activity:   Not my normal self, but able to be up and about with fairly normal activities     reports that she  quit smoking about 43 years ago. Her smoking use included cigarettes. She smoked 0.50 packs per day. She has never used smokeless tobacco. She reports that she does not drink alcohol or use drugs.    Evaluation of Nutritional Risk:   Patient has been identified at mild nutritional risk due to diagnosis and treatment plan.  Patient's chart reviewed and nutritional screening completed as above.      Attempted to call patient for nutritional assessment however no answer.  VM message provided stating the nature of the phone call and RD's contact information with instructions to call back as needed.  Will also mail written diet materials with RD's contact information.  Will follow up as indicated.    Electronically signed by:  Risa Cuevas RD  11:28 AM

## 2019-09-18 NOTE — PROGRESS NOTES
Oral Chemotherapy Teaching      Patient Name/:  Naz Singh   1955  Oral Chemotherapy Regimen:  Xeloda 1500mg PO BID on Days 1-14, off 7 + Oxaliplatin IV on Day 1 of a 21 Day cycle + Avastin on Day 1 of each 21 Day cycle    Date Started Medication:  Cycle 3 anticipate 19 as scheduled    Additional Notes:Pt returned call to report her spouse will stop by [employer] this afternoon to  script for Xeloda to start treatment this week with scheduled infusion.

## 2019-09-19 NOTE — PROGRESS NOTES
CHIEF COMPLAINT: Metastatic colon cancer with peritoneal carcinomatosis    Problem List:  Oncology/Hematology History    1.  Stage IIIB [id number] moderately differentiated colon cancer  2.   History of breast cancer 2003 treated with chemotherapy and radiation with Dr. Rivers  3.   History of cervical cancer treated with radiation in 1993    -3/25/2019 screening colonoscopy found 5.5 cm tumor at 50 cm near the splenic fracture which was tattooed.  This showed moderately differentiated adenocarcinoma and there was a separate hyperplastic polyp 5 mm at 15 cm.  -3/27/2019 CT chest abdomen pelvis showed tumor at the splenic flexure T3 with transmural invasion and no distant metastases.  Positive gallstones and hiatal hernia.  CEA 2.9 with normal CMP.  -4/3/2019 transverse colectomy to the splenic flexure with primary re-anastomosis revealed moderately differentiated adenocarcinoma extending to the serosa/mesenteric fat with 8 out of 12 lymph nodes involved the largest being 2 cm.  Margins negative.  T3N2B stage IIIb  -4/30/2019 initial medical oncology visit: I reviewed her history.  She has 3 first-degree relatives with breast cancer, thyroid cancer in her family, prostate cancer in her first-degree relatives, and has had 2 other malignancies prior to her colon cancer as outlined above.  I have spoken with Dr. Freddy Esquivel to get mismatch repair testing.  She has seen Dr. Adam Royal who, because of the extent of this next to the spleen feels that localized radiation may be reasonable while not standard.  -5/14/2019 office visit: PCR showed she was mismatch repair proficient; therefore, consider Xeloda radiation followed by 6 months of Xeloda oxaliplatin.  Coordinated this with Dr. Royal who will start radiation May 27.  Following that we will start Xeloda oxaliplatin.  She will take 3 tablets of Xeloda twice a day Monday through Friday with radiation.  She will get a chemotherapy education appointment with our  pharmacy doctorate's in [place].  Though mismatch repair proficient, we will still have her see genetic counseling    -6/5/2019 genetic counseling negative    -Began radiation along with Xeloda 6/4/19 and completed 7/15/19    -7/22/2019 CT chest abdomen and pelvis showed progression of disease with extensive peritoneal carcinomatosis    -7/30/2019 office visit: I reviewed the results and images of her CT chest abdomen pelvis showing peritoneal carcinomatosis.  July 9 CEA elevated at 8.4.  Discussed with patient the plan to continue with the Xeloda oxaliplatin but to add Avastin.  Spoken with surgical oncologist at [facility] during a meeting who performs HIPEC.  He suggested primary chemotherapy and if get a response then send to surgeon accordingly.  I plan to give her Avastin Xeloda oxaliplatin and send strata as this is acting like a BRAF mutated cancer.  If that turns out to be the case, I will switch her to cetuximab, Encorafenib, and Binimetinib.  If we get a peritoneal response without distant metastatic visceral disease of the liver or lung appearing, then I will get her to Dr. Sims at the [facility] for consideration of peritoneal debulking with PCI scoring and possible HI PEC.  She understands the palliative nature of our attempts at this junction.  I        Overlapping malignant neoplasm of colon (CMS/HCC)    4/30/2019 Initial Diagnosis     Overlapping malignant neoplasm of colon (CMS/HCC)         6/4/2019 - 7/9/2019 Chemotherapy/Radiation     OP COLON Capecitabine 825 mg/m2 M-F               6/5/2019 Genetic Testing      Genetic testing was negative for mutations in BRCA1/2 and all 32 additional genes on the CancerNext Panel.              7/22/2019 Progression     CT chest abdomen and pelvis revealed rather extensive diffuse peritoneal carcinomatosis, at least 15-20 peritoneal nodules measuring between 1 cm in many as large as 2-3 cm scattered throughout the peritoneal cavity,  significantly progressed from earlier this year.  No metastasis in the chest.  Small left pleural effusion with associated mild left bibasilar atelectasis, otherwise clear lungs.         8/8/2019 -  Chemotherapy     OP COLON CapeOX+ Bevacizumab (Capecitabine / OXALIplatin / Bevacizumab)              HISTORY OF PRESENT ILLNESS:  The patient is a 64 y.o. female, here for follow up on management of Metastatic colon cancer with peritoneal carcinomatosis.  The patient continues to tolerate therapy with Xeloda, oxaliplatin and Avastin fairly well.  She states that she thinks this last cycle was actually a little easier than her first.  No fevers or chills.  She denies any mouth sores, no diarrhea, no skin changes.  Appetite is fair.  Had expected cold intolerance for 4-5 days, no lingering neuropathy.      History reviewed. No pertinent past medical history.  Past Surgical History:   Procedure Laterality Date   • BREAST LUMPECTOMY Left 2003    [facility]   • [place]  [place]    [facility] [place]       Allergies   Allergen Reactions   • Penicillins Nausea And Vomiting       Family History and Social History reviewed and changed as necessary      REVIEW OF SYSTEM:   Review of Systems   Constitutional: Negative for appetite change, chills, diaphoresis, fever and unexpected weight change.  Positive for fatigue.  HENT:   Negative for mouth sores, sore throat and trouble swallowing.    Eyes: Negative for icterus.   Respiratory: Negative for cough, hemoptysis and shortness of breath.    Cardiovascular: Negative for chest pain, leg swelling and palpitations.   Gastrointestinal: Negative for abdominal distention, blood in stool, constipation, diarrhea, nausea and vomiting.  Positive for intermittent abdominal pain.  Endocrine: Negative for hot flashes.   Genitourinary: Negative for bladder incontinence, difficulty urinating, dysuria, frequency and hematuria.    Musculoskeletal: Negative for gait problem, neck pain and neck  "stiffness.   Skin: Negative for rash.   Neurological: Negative for dizziness, gait problem, headaches, light-headedness and numbness.   Hematological: Negative for adenopathy. Does not bruise/bleed easily.   Psychiatric/Behavioral: Negative for depression. The patient is not nervous/anxious.    All other systems reviewed and are negative.       PHYSICAL EXAM    Vitals:    09/19/19 0858   BP: 139/76   Pulse: 92   Resp: 18   Temp: 98.5 °F (36.9 °C)   Weight: 66.7 kg (147 lb)   Height: 168.3 cm (66.25\")     Constitutional: Appears well-developed and well-nourished. No distress.   ECOG: (1) Restricted in physically strenuous activity, ambulatory and able to do work of light nature  HENT:   Head: Normocephalic.   Mouth/Throat: Oropharynx is clear and moist.   Eyes: Conjunctivae are normal. Pupils are equal, round, and reactive. No scleral icterus.   Neck: Neck supple. No JVD present.    Cardiovascular: Normal rate, regular rhythm and normal heart sounds.    Pulmonary/Chest: Breath sounds normal. No respiratory distress.   Abdominal: Soft. Exhibits no distension. There is no tenderness. There is no rebound and no guarding.   Musculoskeletal:Exhibits no edema, tenderness or deformity.   Neurological: Alert and oriented to person, place, and time. Exhibits normal muscle tone.   Skin: No ecchymosis, no petechiae and no rash noted. Not diaphoretic. No cyanosis.   Psychiatric: Normal mood and affect.   Vitals reviewed.  Labs reviewed.  Recent Results (from the past 168 hour(s))   Comprehensive metabolic panel    Collection Time: 09/18/19  1:30 PM   Result Value Ref Range    Glucose 103 (H) 65 - 99 mg/dL    BUN 13 8 - 27 mg/dL    Creatinine 0.61 0.57 - 1.00 mg/dL    eGFR Non African Am 95 >59 mL/min/1.73    eGFR African Am 109 >59 mL/min/1.73    BUN/Creatinine Ratio 21 12 - 28    Sodium 142 134 - 144 mmol/L    Potassium 4.0 3.5 - 5.2 mmol/L    Chloride 107 (H) 96 - 106 mmol/L    Total CO2 20 20 - 29 mmol/L    Calcium 9.4 8.7 - " 10.3 mg/dL    Total Protein 6.3 6.0 - 8.5 g/dL    Albumin 4.0 3.6 - 4.8 g/dL    Globulin 2.3 1.5 - 4.5 g/dL    A/G Ratio 1.7 1.2 - 2.2    Total Bilirubin 0.3 0.0 - 1.2 mg/dL    Alkaline Phosphatase 116 39 - 117 IU/L    AST (SGOT) 25 0 - 40 IU/L    ALT (SGPT) 27 0 - 32 IU/L   CBC and Differential    Collection Time: 09/18/19  1:30 PM   Result Value Ref Range    WBC 2.9 (L) 3.4 - 10.8 x10E3/uL    RBC 3.12 (L) 3.77 - 5.28 x10E6/uL    Hemoglobin 10.1 (L) 11.1 - 15.9 g/dL    Hematocrit 30.6 (L) 34.0 - 46.6 %    MCV 98 (H) 79 - 97 fL    MCH 32.4 26.6 - 33.0 pg    MCHC 33.0 31.5 - 35.7 g/dL    RDW 23.0 (H) 12.3 - 15.4 %    Platelets 284 150 - 450 x10E3/uL    Neutrophil Rel % 57 Not Estab. %    Lymphocyte Rel % 17 Not Estab. %    Monocyte Rel % 17 Not Estab. %    Eosinophil Rel % 8 Not Estab. %    Basophil Rel % 1 Not Estab. %    Neutrophils Absolute 1.7 1.4 - 7.0 x10E3/uL    Lymphocytes Absolute 0.5 (L) 0.7 - 3.1 x10E3/uL    Monocytes Absolute 0.5 0.1 - 0.9 x10E3/uL    Eosinophils Absolute 0.2 0.0 - 0.4 x10E3/uL    Basophils Absolute 0.0 0.0 - 0.2 x10E3/uL    Immature Granulocyte Rel % 0 Not Estab. %    Immature Grans Absolute 0.0 0.0 - 0.1 x10E3/uL    Hematology Comments: Note:    CEA    Collection Time: 09/18/19  1:30 PM   Result Value Ref Range    CEA 6.8 (H) 0.0 - 4.7 ng/mL   Urinalysis without microscopic (no culture) - Urine, Clean Catch    Collection Time: 09/18/19  1:30 PM   Result Value Ref Range    Specific Gravity, UA 1.016 1.005 - 1.030    pH, UA 5.5 5.0 - 7.5    Color, UA Yellow Yellow    Appearance, UA Clear Clear    Leukocytes, UA 2+ (A) Negative    Protein Negative Negative/Trace    Glucose, UA Negative Negative    Ketones Negative Negative    Blood, UA Negative Negative    Bilirubin, UA Negative Negative    Urobilinogen, UA 0.2 0.2 - 1.0 mg/dL    Nitrite, UA Negative Negative       ASSESSMENT & PLAN:    1. Stage IIIB [id number] moderately differentiated colon cancer status post adjuvant james-splenic radiation  Xeloda with plans for adjuvant Xeloda oxaliplatin but with subsequent development of peritoneal carcinomatosis before that could start  2.   History of breast cancer 2003 treated with chemotherapy and radiation with Dr. Rivers  3.   History of cervical cancer treated with radiation in 1993  4.   Fatigue with chemotherapy    Discussion: The patient continues to tolerate therapy with XELOX plus Avastin with no significant side effects.  She has her Xeloda and is ready to start cycle 3 today.  We will continue unchanged.  We will see her back in 3 weeks for follow-up.  I will plan on restaging her with repeat CT scans after her fourth course, I will order those when she returns.  It is encouraging to see that her CEA has decreased to 6.8 from 14.5 on 8/28/2019.  I gave the patient an application for handicap placard as she has fatigue and shortness of breath when she has to walk a significant distance.    As outlined in Dr. Menchaca's note dated 7/30/2019 if we get a response to her peritoneal disease with chemotherapy without distant metastatic visceral disease of the liver or lung appearing then we would get her to Dr. Sims at the [facility] for consideration of peritoneal debulking and possible HIPEC.  Also if strata testing returns with any actionable target such as B raft mutation then we would switch therapy, if BRAF mutated would switch her to cetuximab, encorafenib, and binimetinib.    I spent 15 minutes with the patient. I spent > 50% percent of this time counseling and discussing prognosis, diagnostic testing, evaluation, current status and management.    Itzel Curran, APRN    09/19/2019

## 2019-10-07 NOTE — PROGRESS NOTES
Oral Chemotherapy Teaching      Patient Name/:  Naz Singh   1955  Oral Chemotherapy Regimen:  Xeloda 1500mg PO BID on Days 1-14, off 7 + Oxaliplatin IV on Day 1 of a 21 Day cycle + Avastin on Day 1 of each 21 Day cycle    Date Started Medication:  Cycle 4 anticipate 10/10/19 as scheduled    Additional Notes: Reached out to patient for toxicity check and refill call. Pt reports finishing last day of oral therapy this past , currently on 7 days off. With Cycle 4 scheduled to start on 10/10/19. Reports taste changes (metallic taste) during conversation. Discussed plan for plastic utensils in lieu of metal to aide in reported metallic taste when eating. Also describes diarrhea starting on Day 14 of most recent cycle. Reports she has not used loperamide, stating that she is hesitant to utilize medication although she knows it would likely help. Discussed can use loperamide at the first onset of a loose stool. Pt verbalized understanding. Discussed importance of maintaining hydration, especially on days when diarrhea presents. PT requesting refill of her Xeloda, will plan to start by in person on  to  medication for next cycle. Will alert [facility].

## 2019-10-10 NOTE — PROGRESS NOTES
CHIEF COMPLAINT: Metastatic colon cancer with peritoneal carcinomatosis    Problem List:  Oncology/Hematology History    1.  Stage IIIB [id number] moderately differentiated colon cancer  2.   History of breast cancer 2003 treated with chemotherapy and radiation with Dr. Rivers  3.   History of cervical cancer treated with radiation in 1993    -3/25/2019 screening colonoscopy found 5.5 cm tumor at 50 cm near the splenic fracture which was tattooed.  This showed moderately differentiated adenocarcinoma and there was a separate hyperplastic polyp 5 mm at 15 cm.  -3/27/2019 CT chest abdomen pelvis showed tumor at the splenic flexure T3 with transmural invasion and no distant metastases.  Positive gallstones and hiatal hernia.  CEA 2.9 with normal CMP.  -4/3/2019 transverse colectomy to the splenic flexure with primary re-anastomosis revealed moderately differentiated adenocarcinoma extending to the serosa/mesenteric fat with 8 out of 12 lymph nodes involved the largest being 2 cm.  Margins negative.  T3N2B stage IIIb  -4/30/2019 initial medical oncology visit: I reviewed her history.  She has 3 first-degree relatives with breast cancer, thyroid cancer in her family, prostate cancer in her first-degree relatives, and has had 2 other malignancies prior to her colon cancer as outlined above.  I have spoken with Dr. Freddy Esquivel to get mismatch repair testing.  She has seen Dr. Adam Royal who, because of the extent of this next to the spleen feels that localized radiation may be reasonable while not standard.  -5/14/2019 office visit: PCR showed she was mismatch repair proficient; therefore, consider Xeloda radiation followed by 6 months of Xeloda oxaliplatin.  Coordinated this with Dr. Royal who will start radiation May 27.  Following that we will start Xeloda oxaliplatin.  She will take 3 tablets of Xeloda twice a day Monday through Friday with radiation.  She will get a chemotherapy education appointment with our  pharmacy doctorate's in [place].  Though mismatch repair proficient, we will still have her see genetic counseling    -6/5/2019 genetic counseling negative    -Began radiation along with Xeloda 6/4/19 and completed 7/15/19    -7/22/2019 CT chest abdomen and pelvis showed progression of disease with extensive peritoneal carcinomatosis    -7/30/2019 office visit: I reviewed the results and images of her CT chest abdomen pelvis showing peritoneal carcinomatosis.  July 9 CEA elevated at 8.4.  Discussed with patient the plan to continue with the Xeloda oxaliplatin but to add Avastin.  Spoken with surgical oncologist at [facility] during a meeting who performs HIPEC.  He suggested primary chemotherapy and if get a response then send to surgeon accordingly.  I plan to give her Avastin Xeloda oxaliplatin and send strata as this is acting like a BRAF mutated cancer.  If that turns out to be the case, I will switch her to cetuximab, Encorafenib, and Binimetinib.  If we get a peritoneal response without distant metastatic visceral disease of the liver or lung appearing, then I will get her to Dr. Sims at the [facility] for consideration of peritoneal debulking with PCI scoring and possible HI PEC.  She understands the palliative nature of our attempts at this junction.    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  Microsatellite stable.  TMB low.  PD-L1 low.  Potential relevant therapies include Binimetinib + Encorafenib, see Strata report for full list.         Overlapping malignant neoplasm of colon (CMS/HCC)    4/30/2019 Initial Diagnosis     Overlapping malignant neoplasm of colon (CMS/HCC)         6/4/2019 - 7/9/2019 Chemotherapy/Radiation     OP COLON Capecitabine 825 mg/m2 M-F               6/5/2019 Genetic Testing      Genetic testing was negative for mutations in BRCA1/2 and all 32 additional genes on the CancerNext Panel.              7/22/2019 Progression      CT chest abdomen and pelvis revealed rather extensive diffuse peritoneal carcinomatosis, at least 15-20 peritoneal nodules measuring between 1 cm in many as large as 2-3 cm scattered throughout the peritoneal cavity, significantly progressed from earlier this year.  No metastasis in the chest.  Small left pleural effusion with associated mild left bibasilar atelectasis, otherwise clear lungs.         8/8/2019 -  Chemotherapy     OP COLON CapeOX+ Bevacizumab (Capecitabine / OXALIplatin / Bevacizumab)              HISTORY OF PRESENT ILLNESS:  The patient is a 64 y.o. female, here for follow up on management of Metastatic colon cancer with peritoneal carcinomatosis.  The patient continues to tolerate therapy with Xeloda, oxaliplatin and Avastin fairly well.  She had more fatigue after this last cycle, also some diarrhea but is now taking Imodium and that is well controlled.  No fevers or chills.  She denies any mouth sores, no skin changes.  Appetite is fair.  Had expected cold intolerance for 4-5 days.  Now with mild peripheral neuropathy in her fingertips.      History reviewed. No pertinent past medical history.  Past Surgical History:   Procedure Laterality Date   • BREAST LUMPECTOMY Left 2003    [facility]   • [place]  [place]    [facility] [place]       Allergies   Allergen Reactions   • Penicillins Nausea And Vomiting       Family History and Social History reviewed and changed as necessary      REVIEW OF SYSTEM:   Review of Systems   Constitutional: Negative for appetite change, chills, diaphoresis, fever and unexpected weight change.  Positive for fatigue.  HENT:   Negative for mouth sores, sore throat and trouble swallowing.    Eyes: Negative for icterus.   Respiratory: Negative for cough, hemoptysis and shortness of breath.    Cardiovascular: Negative for chest pain, leg swelling and palpitations.   Gastrointestinal: Negative for abdominal distention, blood in stool, constipation, nausea and  "vomiting.  Positive for intermittent abdominal pain, occasional diarrhea controlled with Imodium.  Endocrine: Negative for hot flashes.   Genitourinary: Negative for bladder incontinence, difficulty urinating, dysuria, frequency and hematuria.    Musculoskeletal: Negative for gait problem, neck pain and neck stiffness.   Skin: Negative for rash.   Neurological: Negative for dizziness, gait problem, headaches, light-headedness.  Positive for numbness in the fingertips.   Hematological: Negative for adenopathy. Does not bruise/bleed easily.   Psychiatric/Behavioral: Negative for depression. The patient is not nervous/anxious.    All other systems reviewed and are negative.       PHYSICAL EXAM    Vitals:    10/10/19 0853   BP: 130/59   Pulse: 80   Resp: 18   Temp: 98.6 °F (37 °C)   Weight: 65.8 kg (145 lb)   Height: 168.3 cm (66.25\")     Constitutional: Appears well-developed and well-nourished. No distress.   ECOG: (1) Restricted in physically strenuous activity, ambulatory and able to do work of light nature  HENT:   Head: Normocephalic.   Mouth/Throat: Oropharynx is clear and moist.   Eyes: Conjunctivae are normal. Pupils are equal, round, and reactive. No scleral icterus.   Neck: Neck supple. No JVD present.    Cardiovascular: Normal rate, regular rhythm and normal heart sounds.    Pulmonary/Chest: Breath sounds normal. No respiratory distress.   Abdominal: Soft. Exhibits no distension. There is no tenderness. There is no rebound and no guarding.   Musculoskeletal:Exhibits no edema, tenderness or deformity.   Neurological: Alert and oriented to person, place, and time. Exhibits normal muscle tone.   Skin: No ecchymosis, no petechiae and no rash noted. Not diaphoretic. No cyanosis.   Psychiatric: Normal mood and affect.   Vitals reviewed.  Labs reviewed.  Recent Results (from the past 168 hour(s))   Urinalysis without microscopic (no culture) - Urine, Clean Catch    Collection Time: 10/09/19  9:35 AM   Result Value " Ref Range    Specific Gravity, UA 1.015 1.005 - 1.030    pH, UA 6.0 5.0 - 7.5    Color, UA Yellow Yellow    Appearance, UA Cloudy (A) Clear    Leukocytes, UA Trace (A) Negative    Protein Trace Negative/Trace    Glucose, UA Negative Negative    Ketones Negative Negative    Blood, UA Negative Negative    Bilirubin, UA Negative Negative    Urobilinogen, UA 0.2 0.2 - 1.0 mg/dL    Nitrite, UA Negative Negative   Comprehensive metabolic panel    Collection Time: 10/09/19  9:35 AM   Result Value Ref Range    Glucose 83 65 - 99 mg/dL    BUN 13 8 - 27 mg/dL    Creatinine 0.52 (L) 0.57 - 1.00 mg/dL    eGFR Non African Am 100 >59 mL/min/1.73    eGFR African Am 115 >59 mL/min/1.73    BUN/Creatinine Ratio 25 12 - 28    Sodium 142 134 - 144 mmol/L    Potassium 3.4 (L) 3.5 - 5.2 mmol/L    Chloride 103 96 - 106 mmol/L    Total CO2 21 20 - 29 mmol/L    Calcium 9.0 8.7 - 10.3 mg/dL    Total Protein 6.1 6.0 - 8.5 g/dL    Albumin 4.1 3.6 - 4.8 g/dL    Globulin 2.0 1.5 - 4.5 g/dL    A/G Ratio 2.1 1.2 - 2.2    Total Bilirubin 0.3 0.0 - 1.2 mg/dL    Alkaline Phosphatase 105 39 - 117 IU/L    AST (SGOT) 26 0 - 40 IU/L    ALT (SGPT) 28 0 - 32 IU/L   CBC and Differential    Collection Time: 10/09/19  9:35 AM   Result Value Ref Range    WBC 2.9 (L) 3.4 - 10.8 x10E3/uL    RBC 2.97 (L) 3.77 - 5.28 x10E6/uL    Hemoglobin 9.8 (L) 11.1 - 15.9 g/dL    Hematocrit 29.7 (L) 34.0 - 46.6 %     (H) 79 - 97 fL    MCH 33.0 26.6 - 33.0 pg    MCHC 33.0 31.5 - 35.7 g/dL    RDW 22.5 (H) 12.3 - 15.4 %    Platelets 310 150 - 450 x10E3/uL    Neutrophil Rel % 51 Not Estab. %    Lymphocyte Rel % 17 Not Estab. %    Monocyte Rel % 19 Not Estab. %    Eosinophil Rel % 12 Not Estab. %    Basophil Rel % 1 Not Estab. %    Neutrophils Absolute 1.5 1.4 - 7.0 x10E3/uL    Lymphocytes Absolute 0.5 (L) 0.7 - 3.1 x10E3/uL    Monocytes Absolute 0.6 0.1 - 0.9 x10E3/uL    Eosinophils Absolute 0.4 0.0 - 0.4 x10E3/uL    Basophils Absolute 0.0 0.0 - 0.2 x10E3/uL    Immature  Granulocyte Rel % 0 Not Estab. %    Immature Grans Absolute 0.0 0.0 - 0.1 x10E3/uL    Hematology Comments: Note:    CEA    Collection Time: 10/09/19  9:35 AM   Result Value Ref Range    CEA 4.9 (H) 0.0 - 4.7 ng/mL       ASSESSMENT & PLAN:    1. Stage IIIB [id number] moderately differentiated colon cancer status post adjuvant james-splenic radiation Xeloda with plans for adjuvant Xeloda oxaliplatin but with subsequent development of peritoneal carcinomatosis before that could start  2.   History of breast cancer 2003 treated with chemotherapy and radiation with Dr. Rivers  3.   History of cervical cancer treated with radiation in 1993  4.   Fatigue with chemotherapy  5.  Chemotherapy-induced diarrhea  6.  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  Microsatellite stable.  TMB low.  PD-L1 low.     Discussion: The patient continues to tolerate therapy with XELOX plus Avastin fairly well, she is having a little more fatigue, some mild neuropathy in her fingertips and occasional diarrhea well-controlled with Imodium.  She has her Xeloda and is ready to start cycle 4 today.  We will continue unchanged.  We will see her back in 3 weeks for follow-up with restaging CT scans prior to return and I have ordered those today.  Her CEA has decreased again, now 4.9 from 14.5 on 8/28/2019.   We discussed also today the results of her strata findings with BRAF mutation and potential future targeted therapies.  We will not change her therapy as of yet as she is tolerating XELOX plus Avastin and her CEA has decreased.    Also as outlined in Dr. Menchaca's note dated 7/30/2019 if we get a response to her peritoneal disease with chemotherapy without distant metastatic visceral disease of the liver or lung appearing then we would get her to Dr. Sims at the [facility] for consideration of peritoneal debulking and possible HIPEC.       I spent 25 minutes with the patient. I spent > 50% percent of this time counseling and discussing prognosis, diagnostic testing, evaluation, current status and management.    Itzel Curran, APRN    10/10/2019

## 2019-10-28 NOTE — TELEPHONE ENCOUNTER
Patient called triage line reporting that this treatment cycle she has had continuous diarrhea, Imodium has not helped this time around. She has lost give or take around 3 lb in the last week. She denies fever, abdominal pain, or other symptoms. She does report that she is feeling weak because of the diarrhea. She is having 8-12 episodes per day. Patient has been offered a [facility] appointment today with GATITO Shell which she has declined. Per Milagros Ronquillo to be called in to local pharmacy. Patient made aware and verbalized understanding.

## 2019-10-29 PROBLEM — Z51.11 ENCOUNTER FOR ANTINEOPLASTIC CHEMOTHERAPY: Status: ACTIVE | Noted: 2019-01-01

## 2019-10-29 NOTE — PROGRESS NOTES
Oral Chemotherapy Teaching      Patient Name/:  Naz Singh   1955  Oral Chemotherapy Regimen:  Cetuximab 400 mg/m2 D1C1, Cetuximab 250 mg/m2 D1C2+ ; Binimetinib 45 mg BID; Encorafenib 300 mg PO Daily  Date Started Medication:     Cycle 1: Anticipated start date 19      Additional Notes: Received referral from , plan for transition to Cetuximab/Binimetinib/Encorafenib given progression of disease noted on prior therapy and BRAF status. Released scripts for Binimetinib 45mg PO BID and Encorafenib 300mg PO daily to [facility] to begin processing. Pt scheduled in oral chemo education clinic for education and financial navigation on 19.

## 2019-10-29 NOTE — PROGRESS NOTES
CHIEF COMPLAINT: Severe diarrhea    Problem List:  Oncology/Hematology History    1.  Stage IIIB [id number] moderately differentiated colon cancer  2.   History of breast cancer 2003 treated with chemotherapy and radiation with Dr. Rivers  3.   History of cervical cancer treated with radiation in 1993    -3/25/2019 screening colonoscopy found 5.5 cm tumor at 50 cm near the splenic fracture which was tattooed.  This showed moderately differentiated adenocarcinoma and there was a separate hyperplastic polyp 5 mm at 15 cm.  -3/27/2019 CT chest abdomen pelvis showed tumor at the splenic flexure T3 with transmural invasion and no distant metastases.  Positive gallstones and hiatal hernia.  CEA 2.9 with normal CMP.  -4/3/2019 transverse colectomy to the splenic flexure with primary re-anastomosis revealed moderately differentiated adenocarcinoma extending to the serosa/mesenteric fat with 8 out of 12 lymph nodes involved the largest being 2 cm.  Margins negative.  T3N2B stage IIIb  -4/30/2019 initial medical oncology visit: I reviewed her history.  She has 3 first-degree relatives with breast cancer, thyroid cancer in her family, prostate cancer in her first-degree relatives, and has had 2 other malignancies prior to her colon cancer as outlined above.  I have spoken with Dr. Freddy Esquivel to get mismatch repair testing.  She has seen Dr. Adam Royal who, because of the extent of this next to the spleen feels that localized radiation may be reasonable while not standard.  -5/14/2019 office visit: PCR showed she was mismatch repair proficient; therefore, consider Xeloda radiation followed by 6 months of Xeloda oxaliplatin.  Coordinated this with Dr. Royal who will start radiation May 27.  Following that we will start Xeloda oxaliplatin.  She will take 3 tablets of Xeloda twice a day Monday through Friday with radiation.  She will get a chemotherapy education appointment with our pharmacy doctorate's in [place].  Though  mismatch repair proficient, we will still have her see genetic counseling    -6/5/2019 genetic counseling negative    -Began radiation along with Xeloda 6/4/19 and completed 7/15/19    -7/22/2019 CT chest abdomen and pelvis showed progression of disease with extensive peritoneal carcinomatosis    -7/30/2019 office visit: I reviewed the results and images of her CT chest abdomen pelvis showing peritoneal carcinomatosis.  July 9 CEA elevated at 8.4.  Discussed with patient the plan to continue with the Xeloda oxaliplatin but to add Avastin.  Spoken with surgical oncologist at [facility] during a meeting who performs HIPEC.  He suggested primary chemotherapy and if get a response then send to surgeon accordingly.  I plan to give her Avastin Xeloda oxaliplatin and send strata as this is acting like a BRAF mutated cancer.  If that turns out to be the case, I will switch her to cetuximab, Encorafenib, and Binimetinib.  If we get a peritoneal response without distant metastatic visceral disease of the liver or lung appearing, then I will get her to Dr. Sims at the [facility] for consideration of peritoneal debulking with PCI scoring and possible HI PEC.  She understands the palliative nature of our attempts at this junction.    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  Microsatellite stable.  TMB low.  PD-L1 low.  Potential relevant therapies include Binimetinib + Encorafenib, see Strata report for full list.     -10/28/2019CT chest abdomen pelvis shows previous granulomatous disease with some emphysematous changes, small left pleural effusion with atelectasis or pneumonitis with decrease in size compared to prior CT.  1.3 cm hepatic dome lesion likely related to serosal implant with mild perihepatic ascites.  Has perisplenic carcinomatosis with additional peritoneal carcinomatosis.  Compared to July the peritoneal carcinomatosis has progressed diffusely including  down into the deep pelvis with numerous implants demonstrated.    -10/29/2019 [facility] medical oncology follow-up visit: I reviewed the images and reports of the CT scan above compared to July where there is clear-cut progression.  CEA had come down from 14.5 at the peak end of August to 4.9 as of 10/9/2019, clearly there is peritoneal progression on CT and she has poorly tolerated the Xeloda oxaliplatin Avastin.  Hence we will, based on that B BRAF V600 E mutation, place her on cetuximab, Binimetinib, and Encorafenib which is the most active regimen for this particular mutation and colorectal cancers.  We will check her ejection fraction and have her see my nurse practitioner back in a week to make sure that is doing well and start her into on this regimen and will have my pharmacy staff educate her on this.  Went over the side effects including rashes and diarrhea.  She is currently having diarrhea from the Xeloda and I will is on Lomotil and Imodium which hopefully will subside over the next week.        Overlapping malignant neoplasm of colon (CMS/HCC)    4/30/2019 Initial Diagnosis     Overlapping malignant neoplasm of colon (CMS/HCC)         6/4/2019 - 7/9/2019 Chemotherapy/Radiation     OP COLON Capecitabine 825 mg/m2 M-F               6/5/2019 Genetic Testing      Genetic testing was negative for mutations in BRCA1/2 and all 32 additional genes on the CancerNext Panel.              7/22/2019 Progression     CT chest abdomen and pelvis revealed rather extensive diffuse peritoneal carcinomatosis, at least 15-20 peritoneal nodules measuring between 1 cm in many as large as 2-3 cm scattered throughout the peritoneal cavity, significantly progressed from earlier this year.  No metastasis in the chest.  Small left pleural effusion with associated mild left bibasilar atelectasis, otherwise clear lungs.         8/8/2019 - 10/21/2019 Chemotherapy     OP COLON CapeOX+ Bevacizumab (Capecitabine / OXALIplatin /  Bevacizumab)           10/28/2019 Imaging     CT chest abdomen pelvis shows previous granulomatous disease with some emphysematous changes, small left pleural effusion with atelectasis or pneumonitis with decrease in size compared to prior CT.  1.3 cm hepatic dome lesion likely related to serosal implant with mild perihepatic ascites.  Has perisplenic carcinomatosis with additional peritoneal carcinomatosis.  Compared to July the peritoneal carcinomatosis has progressed diffusely including down into the deep pelvis with numerous implants demonstrated.            HISTORY OF PRESENT ILLNESS:  The patient is a 64 y.o. female, here for follow up on management of on Xeloda oxaliplatin Avastin, she is felt horrible and is having over 6 stools a day.  This is despite the Imodium and has started Lomotil in the past 48 hours and is starting to slow down.      History reviewed. No pertinent past medical history.  Past Surgical History:   Procedure Laterality Date   • BREAST LUMPECTOMY Left 2003    [facility]   • COLON SURGERY  2019    [facility] Dr. Hammer       Allergies   Allergen Reactions   • Penicillins Nausea And Vomiting       Family History and Social History reviewed and changed as necessary      REVIEW OF SYSTEM:   Review of Systems   Constitutional: Negative for appetite change, chills, diaphoresis, fatigue, fever and unexpected weight change.   HENT:   Negative for mouth sores, sore throat and trouble swallowing.    Eyes: Negative for icterus.   Respiratory: Negative for cough, hemoptysis and shortness of breath.    Cardiovascular: Negative for chest pain, leg swelling and palpitations.   Gastrointestinal: Negative for abdominal distention, abdominal pain, blood in stool, constipation, diarrhea, nausea and vomiting.   Endocrine: Negative for hot flashes.   Genitourinary: Negative for bladder incontinence, difficulty urinating, dysuria, frequency and hematuria.    Musculoskeletal: Negative for gait problem, neck pain  "and neck stiffness.   Skin: Negative for rash.   Neurological: Negative for dizziness, gait problem, headaches, light-headedness and numbness.   Hematological: Negative for adenopathy. Does not bruise/bleed easily.   Psychiatric/Behavioral: Negative for depression. The patient is not nervous/anxious.    All other systems reviewed and are negative.       PHYSICAL EXAM    Vitals:    10/29/19 1209   BP: 130/80   Pulse: 80   Resp: 20   Temp: 98.6 °F (37 °C)   Weight: 64.9 kg (143 lb)   Height: 168.3 cm (66.25\")     Constitutional: Appears well-developed and well-nourished. No distress.   ECOG: (1) Restricted in physically strenuous activity, ambulatory and able to do work of light nature  HENT:   Head: Normocephalic.   Mouth/Throat: Oropharynx is clear and moist.   Eyes: Conjunctivae are normal. Pupils are equal, round, and reactive to light. No scleral icterus.   Neck: Neck supple. No JVD present. No thyromegaly present.   Cardiovascular: Normal rate, regular rhythm and normal heart sounds.    Pulmonary/Chest: Breath sounds normal. No respiratory distress.   Abdominal: Soft. Exhibits no distension and no mass. There is no hepatosplenomegaly. There is no tenderness. There is no rebound and no guarding.   Musculoskeletal:Exhibits no edema, tenderness or deformity.   Neurological: Alert and oriented to person, place, and time. Exhibits normal muscle tone.   Skin: No ecchymosis, no petechiae and no rash noted. Not diaphoretic. No cyanosis. Nails show no clubbing.   Psychiatric: Normal mood and affect.   Vitals reviewed.      Lab Results   Component Value Date    HGB 9.8 (L) 10/09/2019    HCT 29.7 (L) 10/09/2019     (H) 10/09/2019     10/09/2019    WBC 2.9 (L) 10/09/2019    NEUTROABS 1.5 10/09/2019    LYMPHSABS 0.5 (L) 10/09/2019    MONOSABS 0.6 10/09/2019    EOSABS 0.4 10/09/2019    BASOSABS 0.0 10/09/2019       Lab Results   Component Value Date    BUN 13 10/09/2019    CREATININE 0.52 (L) 10/09/2019    NA " 142 10/09/2019    K 3.4 (L) 10/09/2019     10/09/2019    CO2 21 10/09/2019    CALCIUM 9.0 10/09/2019    ALBUMIN 4.1 10/09/2019    BILITOT 0.3 10/09/2019    ALKPHOS 105 10/09/2019    AST 26 10/09/2019    ALT 28 10/09/2019                   ASSESSMENT & PLAN:    1.  Stage IIIB [id number] moderately differentiated colon cancer with subsequent peritoneal carcinomatosis BRAF V600E mutated  2.   History of breast cancer 2003 treated with chemotherapy and radiation with Dr. Rivers  3.   History of cervical cancer treated with radiation in 1993    -3/25/2019 screening colonoscopy found 5.5 cm tumor at 50 cm near the splenic fracture which was tattooed.  This showed moderately differentiated adenocarcinoma and there was a separate hyperplastic polyp 5 mm at 15 cm.  -3/27/2019 CT chest abdomen pelvis showed tumor at the splenic flexure T3 with transmural invasion and no distant metastases.  Positive gallstones and hiatal hernia.  CEA 2.9 with normal CMP.  -4/3/2019 transverse colectomy to the splenic flexure with primary re-anastomosis revealed moderately differentiated adenocarcinoma extending to the serosa/mesenteric fat with 8 out of 12 lymph nodes involved the largest being 2 cm.  Margins negative.  T3N2B stage IIIb  -4/30/2019 initial medical oncology visit: I reviewed her history.  She has 3 first-degree relatives with breast cancer, thyroid cancer in her family, prostate cancer in her first-degree relatives, and has had 2 other malignancies prior to her colon cancer as outlined above.  I have spoken with Dr. Freddy Esquivel to get mismatch repair testing.  She has seen Dr. Adam Royal who, because of the extent of this next to the spleen feels that localized radiation may be reasonable while not standard.  -5/14/2019 office visit: PCR showed she was mismatch repair proficient; therefore, consider Xeloda radiation followed by 6 months of Xeloda oxaliplatin.  Coordinated this with Dr. Royal who will start radiation  May 27.  Following that we will start Xeloda oxaliplatin.  She will take 3 tablets of Xeloda twice a day Monday through Friday with radiation.  She will get a chemotherapy education appointment with our pharmacy doctorate's in [place].  Though mismatch repair proficient, we will still have her see genetic counseling    -6/5/2019 genetic counseling negative    -Began radiation along with Xeloda 6/4/19 and completed 7/15/19    -7/22/2019 CT chest abdomen and pelvis showed progression of disease with extensive peritoneal carcinomatosis    -7/30/2019 office visit: I reviewed the results and images of her CT chest abdomen pelvis showing peritoneal carcinomatosis.  July 9 CEA elevated at 8.4.  Discussed with patient the plan to continue with the Xeloda oxaliplatin but to add Avastin.  Spoken with surgical oncologist at [facility] during a meeting who performs HIPEC.  He suggested primary chemotherapy and if get a response then send to surgeon accordingly.  I plan to give her Avastin Xeloda oxaliplatin and send strata as this is acting like a BRAF mutated cancer.  If that turns out to be the case, I will switch her to cetuximab, Encorafenib, and Binimetinib.  If we get a peritoneal response without distant metastatic visceral disease of the liver or lung appearing, then I will get her to Dr. Sims at the [facility] for consideration of peritoneal debulking with PCI scoring and possible HI PEC.  She understands the palliative nature of our attempts at this junction.    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  Microsatellite stable.  TMB low.  PD-L1 low.  Potential relevant therapies include Binimetinib + Encorafenib, see Strata report for full list.     -10/28/2019CT chest abdomen pelvis shows previous granulomatous disease with some emphysematous changes, small left pleural effusion with atelectasis or pneumonitis with decrease in size compared to prior CT.  1.3  cm hepatic dome lesion likely related to serosal implant with mild perihepatic ascites.  Has perisplenic carcinomatosis with additional peritoneal carcinomatosis.  Compared to July the peritoneal carcinomatosis has progressed diffusely including down into the deep pelvis with numerous implants demonstrated.    -10/29/2019 [facility] medical oncology follow-up visit: I reviewed the images and reports of the CT scan above compared to July where there is clear-cut progression.  CEA had come down from 14.5 at the peak end of August to 4.9 as of 10/9/2019, clearly there is peritoneal progression on CT and she has poorly tolerated the Xeloda oxaliplatin Avastin.  Hence we will, based on that B BRAF V600 E mutation, place her on cetuximab, Binimetinib, and Encorafenib which is the most active regimen for this particular mutation and colorectal cancers.  We will check her ejection fraction and have her see my nurse practitioner back in a week to make sure that is doing well and start her into on this regimen and will have my pharmacy staff educate her on this.  Went over the side effects including rashes and diarrhea.  She is currently having diarrhea from the Xeloda and I will is on Lomotil and Imodium which hopefully will subside over the next week.    I discussed with patient face-to-face 30 minutes greater than 50% spent counseling regarding this complex plan and the change in therapy as outlined above.  Zhang Menchaca MD    10/29/2019

## 2019-10-29 NOTE — PROGRESS NOTES
"Onc Nutrition    Patient: Naz Singh  YOB: 1955    Weight: 143# - down ~4# x 6 weeks  Nutrition Symptoms: diarrhea    Message received from [facility] office regarding patient with diarrhea and recent weight loss.  Patient's chart reviewed and attempted to contact patient for nutritional consult, however no answer.      VM message provided stating the nature of the phone, RD's contact information, and to call back with nutritional questions/concerns.  Will also mail written diet material \"Tips for Control of Diarrhea\" to aid with symptom management.    Will follow up as indicated.  RD available to assist prn.  Thanks,    Risa Cuevas, KETURAH  10/29/19          "

## 2019-11-01 NOTE — PROGRESS NOTES
Oral Chemotherapy Teaching      Patient Name/:  Naz Singh   1955  Oral Chemotherapy Regimen:  Cetuximab 400 mg/m2 D1C1, Cetuximab 250 mg/m2 D1C2+ ; Binimetinib 45 mg BID; Encorafenib 300 mg PO Daily  Date Started Medication:     Cycle 1: Anticipated start date 19 (pending medication acquisition - no part D insurance coverage)      Initial Teaching Follow Up Comments     Safety     Storage instructions (away from children; away from heat/cold, sunlight, or moisture), handling - use of gloves (caregivers), washing hands after touching pills, managing waste     “How are you storing your medications?”, reminders on storage, proper handling (caregivers using gloves, washing hands, away from children, managing waste, etc.), disposal of medication with D/C or dosage change    Patient counseled on hazardous handling and storage precautions. Discussed plan to wash hands after handling. Caregivers to handle with latex gloves. Store at room temp, away from pets and children. Pt verbalized understanding.      Adherence      patient and/or caregiver on how to take medication, take with/without food, assess their adherence potential, stress importance of adherence, ways to manage adherence (pill boxes, phone reminders, calendars), what to do if miss a dose   “How are you taking your medication?” “How are you remembering to take your medication?”, “How many doses have you missed?”, determine reasons for non-adherence (not remembering, side effects, etc), ways to improve, overadherence? Remind patient of ways to improve/maintain adherence   Pt report no Part D prescription drug coverage. Financial navigator assisting with manufacture forms. Discussed plan for IV Cetuximab to be administered weekly. Reviewed Binimetinib 45mg (3 tablets) by mouth twice a day and Encorafenib 300mg (4 capsules) by mouth once a day. Medication may be taken with or without meals. Provided patient with tentative calendar.      Side  Effects/Adverse Reactions      patient on potential side effects, s/s, ways to manage, when to call MD/seek help     Determine if patient experiencing side effects, ways to manage  Discussed the following side effects including but not limited to: infusion reaction, acneiform rash, nail changes, changes in kidney function, diarrhea, N/V, abdominal pain, changes in blood counts, fatigue, and diarrhea. Discussed PRN us of loperamide and ondansetron. Pt reports concern regarding diarrhea, plans to bring in medications she previously was prescribed to discuss during next infusion appt.      Miscellaneous     Food interactions, DDIs, financial issues Determine if patient started any new medications since being placed on oral chemo (analyze for DDI) No DDIs noted with active medication list and new treatment regimen. No prescription drug coverage. Financial navigator assisting with completion of manufacture forms for assistance with oral therapy.      Additional Notes: Discussed aforementioned material with patient in clinic. All questions and concerns addressed. Provided patient with my contact information and instructions to call should additional questions arise. CCA on file. Obtained Consent.

## 2019-11-05 NOTE — PROGRESS NOTES
Onc Nutrition     Patient Name:  Naz Singh  YOB: 1955  MRN: [id number]  Admit Date:  (Not on file)    Received VM message from patient regarding written diet materials she had received and needing another copy.  Returned patient's phone call however no answer.  VM message provided stating the nature of the phone call and RD's contact information with instructions to return call as needed.  Will mail written diet materials patient is requesting.  Will follow up as indicated.  RD available to assist prn.    Electronically signed by:  Risa Cuevas RD  11/05/19 9:53 AM

## 2019-11-06 NOTE — PROGRESS NOTES
CHIEF COMPLAINT: Follow up metastatic colon cancer    Problem List:  Oncology/Hematology History    1.  Stage IIIB [id number] moderately differentiated colon cancer  2.   History of breast cancer 2003 treated with chemotherapy and radiation with Dr. Rivers  3.   History of cervical cancer treated with radiation in 1993    -3/25/2019 screening colonoscopy found 5.5 cm tumor at 50 cm near the splenic fracture which was tattooed.  This showed moderately differentiated adenocarcinoma and there was a separate hyperplastic polyp 5 mm at 15 cm.  -3/27/2019 CT chest abdomen pelvis showed tumor at the splenic flexure T3 with transmural invasion and no distant metastases.  Positive gallstones and hiatal hernia.  CEA 2.9 with normal CMP.  -4/3/2019 transverse colectomy to the splenic flexure with primary re-anastomosis revealed moderately differentiated adenocarcinoma extending to the serosa/mesenteric fat with 8 out of 12 lymph nodes involved the largest being 2 cm.  Margins negative.  T3N2B stage IIIb  -4/30/2019 initial medical oncology visit: I reviewed her history.  She has 3 first-degree relatives with breast cancer, thyroid cancer in her family, prostate cancer in her first-degree relatives, and has had 2 other malignancies prior to her colon cancer as outlined above.  I have spoken with Dr. Freddy Esquivel to get mismatch repair testing.  She has seen Dr. Adam Royal who, because of the extent of this next to the spleen feels that localized radiation may be reasonable while not standard.  -5/14/2019 office visit: PCR showed she was mismatch repair proficient; therefore, consider Xeloda radiation followed by 6 months of Xeloda oxaliplatin.  Coordinated this with Dr. Royal who will start radiation May 27.  Following that we will start Xeloda oxaliplatin.  She will take 3 tablets of Xeloda twice a day Monday through Friday with radiation.  She will get a chemotherapy education appointment with our pharmacy doctorate's in  [place].  Though mismatch repair proficient, we will still have her see genetic counseling    -6/5/2019 genetic counseling negative    -Began radiation along with Xeloda 6/4/19 and completed 7/15/19    -7/22/2019 CT chest abdomen and pelvis showed progression of disease with extensive peritoneal carcinomatosis    -7/30/2019 office visit: I reviewed the results and images of her CT chest abdomen pelvis showing peritoneal carcinomatosis.  July 9 CEA elevated at 8.4.  Discussed with patient the plan to continue with the Xeloda oxaliplatin but to add Avastin.  Spoken with surgical oncologist at [facility] during a meeting who performs HIPEC.  He suggested primary chemotherapy and if get a response then send to surgeon accordingly.  I plan to give her Avastin Xeloda oxaliplatin and send strata as this is acting like a BRAF mutated cancer.  If that turns out to be the case, I will switch her to cetuximab, Encorafenib, and Binimetinib.  If we get a peritoneal response without distant metastatic visceral disease of the liver or lung appearing, then I will get her to Dr. Sims at the [facility] for consideration of peritoneal debulking with PCI scoring and possible HI PEC.  She understands the palliative nature of our attempts at this junction.    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  Microsatellite stable.  TMB low.  PD-L1 low.  Potential relevant therapies include Binimetinib + Encorafenib, see Strata report for full list.     -10/28/2019CT chest abdomen pelvis shows previous granulomatous disease with some emphysematous changes, small left pleural effusion with atelectasis or pneumonitis with decrease in size compared to prior CT.  1.3 cm hepatic dome lesion likely related to serosal implant with mild perihepatic ascites.  Has perisplenic carcinomatosis with additional peritoneal carcinomatosis.  Compared to July the peritoneal carcinomatosis has progressed  diffusely including down into the deep pelvis with numerous implants demonstrated.    -10/29/2019 [facility] medical oncology follow-up visit: I reviewed the images and reports of the CT scan above compared to July where there is clear-cut progression.  CEA had come down from 14.5 at the peak end of August to 4.9 as of 10/9/2019, clearly there is peritoneal progression on CT and she has poorly tolerated the Xeloda oxaliplatin Avastin.  Hence we will, based on that B BRAF V600 E mutation, place her on cetuximab, Binimetinib, and Encorafenib which is the most active regimen for this particular mutation and colorectal cancers.  We will check her ejection fraction and have her see my nurse practitioner back in a week to make sure that is doing well and start her into on this regimen and will have my pharmacy staff educate her on this.  Went over the side effects including rashes and diarrhea.  She is currently having diarrhea from the Xeloda and I will is on Lomotil and Imodium which hopefully will subside over the next week.    -11/1/2019 echocardiogram with normal left ventricular systolic function calculated EF 59%.        Overlapping malignant neoplasm of colon (CMS/HCC)    4/30/2019 Initial Diagnosis     Overlapping malignant neoplasm of colon (CMS/HCC)         6/4/2019 - 7/9/2019 Chemotherapy/Radiation     OP COLON Capecitabine 825 mg/m2 M-F               6/5/2019 Genetic Testing      Genetic testing was negative for mutations in BRCA1/2 and all 32 additional genes on the CancerNext Panel.              7/22/2019 Progression     CT chest abdomen and pelvis revealed rather extensive diffuse peritoneal carcinomatosis, at least 15-20 peritoneal nodules measuring between 1 cm in many as large as 2-3 cm scattered throughout the peritoneal cavity, significantly progressed from earlier this year.  No metastasis in the chest.  Small left pleural effusion with associated mild left bibasilar atelectasis, otherwise clear  lungs.         8/8/2019 - 10/21/2019 Chemotherapy     OP COLON CapeOX+ Bevacizumab (Capecitabine / OXALIplatin / Bevacizumab)           10/28/2019 Imaging     CT chest abdomen pelvis shows previous granulomatous disease with some emphysematous changes, small left pleural effusion with atelectasis or pneumonitis with decrease in size compared to prior CT.  1.3 cm hepatic dome lesion likely related to serosal implant with mild perihepatic ascites.  Has perisplenic carcinomatosis with additional peritoneal carcinomatosis.  Compared to July the peritoneal carcinomatosis has progressed diffusely including down into the deep pelvis with numerous implants demonstrated.         11/1/2019 -  Other Event     Echocardiogram  · Left ventricular systolic function is normal.  · Calculated EF = 59.0%. Normal Global Longitudinal LV strain = -20.5%.  · Left ventricular diastolic dysfunction (grade II) consistent with pseudonormalization.  · Left atrial cavity size is mildly dilated.            HISTORY OF PRESENT ILLNESS:  The patient is a 64 y.o. female, here for follow up on management of metastatic colon cancer with peritoneal carcinomatosis.  Off of treatment for a few weeks the patient is now feeling much better.  She is eating well.  She has gained a pound.  Diarrhea has stopped, bowel movements are now regular.  She denies any pain.  Awaiting shipment of her new oral chemotherapy.  Had her echocardiogram last week, here today to go over those results.    History reviewed. No pertinent past medical history.  Past Surgical History:   Procedure Laterality Date   • BREAST LUMPECTOMY Left 2003    [facility]   • COLON SURGERY  [place]    [facility] [place] [place]       Allergies   Allergen Reactions   • Penicillins Nausea And Vomiting       Family History and Social History reviewed and changed as necessary      REVIEW OF SYSTEM:   Review of Systems   Constitutional: Negative for appetite change, chills, diaphoresis, fever and unexpected  "weight change.  Positive for mild fatigue.  HENT:   Negative for mouth sores, sore throat and trouble swallowing.    Eyes: Negative for icterus.   Respiratory: Negative for cough, hemoptysis and shortness of breath.    Cardiovascular: Negative for chest pain, leg swelling and palpitations.   Gastrointestinal: Negative for abdominal distention, abdominal pain, blood in stool, constipation, diarrhea, nausea and vomiting.   Endocrine: Negative for hot flashes.   Genitourinary: Negative for bladder incontinence, difficulty urinating, dysuria, frequency and hematuria.    Musculoskeletal: Negative for gait problem, neck pain and neck stiffness.   Skin: Negative for rash.   Neurological: Negative for dizziness, gait problem, headaches, light-headedness and numbness.   Hematological: Negative for adenopathy. Does not bruise/bleed easily.   Psychiatric/Behavioral: Negative for depression. The patient is not nervous/anxious.    All other systems reviewed and are negative.       PHYSICAL EXAM    Vitals:    11/06/19 0955   BP: 138/79   Pulse: 85   Resp: 18   Temp: 98.1 °F (36.7 °C)   Weight: 65.3 kg (144 lb)   Height: 168.3 cm (66.25\")     Constitutional: Appears well-developed and well-nourished. No distress.   ECOG: (1) Restricted in physically strenuous activity, ambulatory and able to do work of light nature  HENT:   Head: Normocephalic.   Mouth/Throat: Oropharynx is clear and moist.   Eyes: Conjunctivae are normal. Pupils are equal, round, and reactive. No scleral icterus.   Neck: Neck supple. No JVD present. No thyromegaly present.   Pulmonary/Chest: No respiratory distress.   Abdominal: Soft. Exhibits no distension.   Neurological: Alert and oriented to person, place, and time. Exhibits normal muscle tone.   Skin: No ecchymosis, no petechiae and no rash noted. Not diaphoretic. No cyanosis. Nails show no clubbing.   Psychiatric: Normal mood and affect.   Vitals reviewed.  Labs reviewed.    Lab Results   Component Value " Date    HGB 9.8 (L) 10/09/2019    HCT 29.7 (L) 10/09/2019     (H) 10/09/2019     10/09/2019    WBC 2.9 (L) 10/09/2019    NEUTROABS 1.5 10/09/2019    LYMPHSABS 0.5 (L) 10/09/2019    MONOSABS 0.6 10/09/2019    EOSABS 0.4 10/09/2019    BASOSABS 0.0 10/09/2019       Lab Results   Component Value Date    BUN 13 10/09/2019    CREATININE 0.52 (L) 10/09/2019     10/09/2019    K 3.4 (L) 10/09/2019     10/09/2019    CO2 21 10/09/2019    CALCIUM 9.0 10/09/2019    ALBUMIN 4.1 10/09/2019    BILITOT 0.3 10/09/2019    ALKPHOS 105 10/09/2019    AST 26 10/09/2019    ALT 28 10/09/2019         ASSESSMENT & PLAN:    1.  Stage IIIB [id number] moderately differentiated colon cancer with subsequent peritoneal carcinomatosis BRAF V600E mutated  2.   History of breast cancer 2003 treated with chemotherapy and radiation with Dr. Rivers  3.   History of cervical cancer treated with radiation in 1993    -3/25/2019 screening colonoscopy found 5.5 cm tumor at 50 cm near the splenic fracture which was tattooed.  This showed moderately differentiated adenocarcinoma and there was a separate hyperplastic polyp 5 mm at 15 cm.  -3/27/2019 CT chest abdomen pelvis showed tumor at the splenic flexure T3 with transmural invasion and no distant metastases.  Positive gallstones and hiatal hernia.  CEA 2.9 with normal CMP.  -4/3/2019 transverse colectomy to the splenic flexure with primary re-anastomosis revealed moderately differentiated adenocarcinoma extending to the serosa/mesenteric fat with 8 out of 12 lymph nodes involved the largest being 2 cm.  Margins negative.  T3N2B stage IIIb  -4/30/2019 initial medical oncology visit: I reviewed her history.  She has 3 first-degree relatives with breast cancer, thyroid cancer in her family, prostate cancer in her first-degree relatives, and has had 2 other malignancies prior to her colon cancer as outlined above.  I have spoken with Dr. Freddy Esquivel to get mismatch repair testing.  She  has seen Dr. Adam Royal who, because of the extent of this next to the spleen feels that localized radiation may be reasonable while not standard.  -5/14/2019 office visit: PCR showed she was mismatch repair proficient; therefore, consider Xeloda radiation followed by 6 months of Xeloda oxaliplatin.  Coordinated this with Dr. Royal who will start radiation May 27.  Following that we will start Xeloda oxaliplatin.  She will take 3 tablets of Xeloda twice a day Monday through Friday with radiation.  She will get a chemotherapy education appointment with our pharmacy doctorate's in [place].  Though mismatch repair proficient, we will still have her see genetic counseling    -6/5/2019 genetic counseling negative    -Began radiation along with Xeloda 6/4/19 and completed 7/15/19    -7/22/2019 CT chest abdomen and pelvis showed progression of disease with extensive peritoneal carcinomatosis    -7/30/2019 office visit: I reviewed the results and images of her CT chest abdomen pelvis showing peritoneal carcinomatosis.  July 9 CEA elevated at 8.4.  Discussed with patient the plan to continue with the Xeloda oxaliplatin but to add Avastin.  Spoken with surgical oncologist at [facility] during a meeting who performs HIPEC.  He suggested primary chemotherapy and if get a response then send to surgeon accordingly.  I plan to give her Avastin Xeloda oxaliplatin and send strata as this is acting like a BRAF mutated cancer.  If that turns out to be the case, I will switch her to cetuximab, Encorafenib, and Binimetinib.  If we get a peritoneal response without distant metastatic visceral disease of the liver or lung appearing, then I will get her to Dr. Sims at the [facility] for consideration of peritoneal debulking with PCI scoring and possible HI PEC.  She understands the palliative nature of our attempts at this junction.    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   Microsatellite stable.  TMB low.  PD-L1 low.  Potential relevant therapies include Binimetinib + Encorafenib, see Strata report for full list.     -10/28/2019CT chest abdomen pelvis shows previous granulomatous disease with some emphysematous changes, small left pleural effusion with atelectasis or pneumonitis with decrease in size compared to prior CT.  1.3 cm hepatic dome lesion likely related to serosal implant with mild perihepatic ascites.  Has perisplenic carcinomatosis with additional peritoneal carcinomatosis.  Compared to July the peritoneal carcinomatosis has progressed diffusely including down into the deep pelvis with numerous implants demonstrated.    -10/29/2019 [facility] medical oncology follow-up visit: I reviewed the images and reports of the CT scan above compared to July where there is clear-cut progression.  CEA had come down from 14.5 at the peak end of August to 4.9 as of 10/9/2019, clearly there is peritoneal progression on CT and she has poorly tolerated the Xeloda oxaliplatin Avastin.  Hence we will, based on that B BRAF V600 E mutation, place her on cetuximab, Binimetinib, and Encorafenib which is the most active regimen for this particular mutation and colorectal cancers.  We will check her ejection fraction and have her see my nurse practitioner back in a week to make sure that is doing well and start her into on this regimen and will have my pharmacy staff educate her on this.  Went over the side effects including rashes and diarrhea.  She is currently having diarrhea from the Xeloda and I will is on Lomotil and Imodium which hopefully will subside over the next week.    -11/6/2019 [facility] medical oncology clinic visit:  The patient is feeling much better a few weeks out from any chemotherapy.  She is awaiting shipment of her oral chemotherapy.  Echocardiogram on 11/1/2019 showed normal left ventricular systolic function with calculated EF 59%.  She has been working with our pharmacist  and oral chemotherapy team and has been able to obtain free drug from the manufacture and should receive shipment of Binimetinib and Encorafenib hopefully this week.  She is set to begin cetuximab next Thursday in [place].  I will see her back early December with her third cycle to see how she is doing.      Itzel Curran, APRN    11/06/2019

## 2019-11-07 NOTE — TELEPHONE ENCOUNTER
----- Message from Risa Cortés RN sent at 11/7/2019  1:35 PM EST -----  Regarding: HYMAN- Ibuprofen  Contact: [phone]  Pt called wanting to make sure it is okay for her to take ibuprofen. She states she is concerned about risk for GI bleeding associated with it and her treatment.  Her call back number is [phone].

## 2019-11-07 NOTE — TELEPHONE ENCOUNTER
Pt called wanting to make sure it is okay for her to take ibuprofen. She states she is concerned about risk for GI bleeding associated with it and her treatment.  Her call back number is [phone].  Message sent to RN to discuss with physician and return call to patient

## 2019-11-13 NOTE — TELEPHONE ENCOUNTER
Patient needs a call back on this as soon as possible. She needs to know weather or not to take her medication before her appointment tomorrow.

## 2019-11-14 NOTE — TELEPHONE ENCOUNTER
Reached out to patient this morning to confirm plan to take AM dose of Mektovi. No answer, LVM. Pt scheduled for pharmacy chemo education visit this AM prior to infusion. Will plan to review in person.

## 2019-11-14 NOTE — PLAN OF CARE
Outpatient Infusion • [address] • [address] • [place] [id number] • [phone]      CHEMOTHERAPY EDUCATION SHEET    NAME:  Naz Singh      : 1955           DATE: 19    Booklets Given: Chemotherapy and You []  Eating Hints []    Sexuality/Fertility Books []     Chemotherapy/Biotherapy Education Sheets: (list all that apply)  Cetuximab                                                                                                                                                                Chemotherapy Regimen:  Cetuximab Q weekly Infusion    TOPICS EDUCATION PROVIDED EDUCATION REINFORCED COMMENTS   ANEMIA:  role of RBC, cause, s/s, ways to manage, role of transfusion [x] [] Counseled patient on rbc    THROMBOCYTOPENIA:  role of platelet, cause, s/s, ways to prevent bleeding, things to avoid, when to seek help [x] [] Counseled patient on platelets and increased risk of bleeding   NEUTROPENIA:  role of WBC, cause, infection precautions, s/s of infection, when to call MD [x] [] Counseled patient on WBC and higher risk of infection. Family does NOT have flu shots but were recommended to get them   NUTRITION & APPETITE CHANGES:  importance of maintaining healthy diet & weight, ways to manage to improve intake, dietary consult, exercise regimen [x] [] Counseled patient to maintain a healthy diet   DIARRHEA:  causes, s/s of dehydration, ways to manage, dietary changes, when to call MD [x] [] Counseled patient on using loperamide   CONSTIPATION:  causes, ways to manage, dietary changes, when to call MD [x] [] Counseled patient on consuming fiber and recommended metamucil OTC    NAUSEA & VOMITING:  cause, use of antiemetics, dietary changes, when to call MD [x] [] Counseled patient to take Zofran PRN TID   MOUTH SORES:  causes, oral care, ways to manage [x] [] Counseled patient on good oral hygiene   ALOPECIA:  cause, ways to manage, resources [] []    INFERTILITY & SEXUALITY:  causes,  fertility preservation options, sexuality changes, ways to manage, importance of birth control [] []    NERVOUS SYSTEM CHANGES:  causes, s/s, neuropathies, cognitive changes, ways to manage [] []    PAIN:  causes, ways to manage [] [] ????   SKIN & NAIL CHANGES:  cause, s/s, ways to manage [x] [] Counseled patient on darkened nail beds   ORGAN TOXICITIES:  cause, s/s, need for diagnostic tests, labs, when to notify MD [x] [] Counseled patient on liver toxicity signs and symptoms. Also counseled patient on potential for medication to cause rash and to call for a prescription of tetracycline. Also counseled to avoid sunlight.    SURVIVORSHIP:  distress, distress assessment, secondary malignancies, early/late effects, follow-up, social issues, social support [] []    HOME CARE:  use of spill kits, storing of PO chemo, how to manage bodily fluids [] []    MISCELLANEOUS:  drug interactions, administration, vesicant, et [x] [] Counseled patient to inform nurse if feeling very different during infusion     Referrals:        Notes:   Met with patient for about 35 minutes to discuss this medication and two oral medications. Patient was concerned about side effects but we reassured her multiple times that she would not experience all of the side effects but counseling was still important.

## 2019-11-14 NOTE — PLAN OF CARE
Oral Chemotherapy Teaching      Patient Name/:  Naz Singh   1955  Oral Chemotherapy Regimen:  [unfilled]  Date Started Medication: Binimetinib 45 mg BID and Encorafenib 300 mg QD     Initial Teaching Follow Up Comments     Safety     Storage instructions (away from children; away from heat/cold, sunlight, or moisture), handling - use of gloves (caregivers), washing hands after touching pills, managing waste     “How are you storing your medications?”, reminders on storage, proper handling (caregivers using gloves, washing hands, away from children, managing waste, etc.), disposal of medication with D/C or dosage change    Discussed how to store and handle medication with patient and her . Knows to separate chemotherapy from regular medication      Adherence      patient and/or caregiver on how to take medication, take with/without food, assess their adherence potential, stress importance of adherence, ways to manage adherence (pill boxes, phone reminders, calendars), what to do if miss a dose   “How are you taking your medication?” “How are you remembering to take your medication?”, “How many doses have you missed?”, determine reasons for non-adherence (not remembering, side effects, etc), ways to improve, overadherence? Remind patient of ways to improve/maintain adherence   Counseled on how to time doses during the day. Patient wanted to know if encorafenib capsules could be split into two doses and we advised her to take all four at once but gave her the autonomy to choose when to help with adherence. Discussed how to handle missed doses and to take capsules with apple sauce if swallowing was an issue.      Side Effects/Adverse Reactions      patient on potential side effects, s/s, ways to manage, when to call MD/seek help     Determine if patient experiencing side effects, ways to manage  Counseled patient on side effects: ocular toxicity, QT prolongation, headache, fever, skin  lesion, nausea, diarrhea, vomit     Miscellaneous     Food interactions, DDIs, financial issues Determine if patient started any new medications since being placed on oral chemo (analyze for DDI) Patient asked about if she could use acetaminophen, explained how acetaminophen is toxic to the liver      Additional Notes: Patient seems to have a good grasp on how to take medications but was noticeably anxious about the many side effects of the medications. It will be important to evaluate her comfort with her medications at following visits.

## 2019-11-14 NOTE — PROGRESS NOTES
Onc Nutrition     Patient:  Naz Singh  YOB: 1955    Diagnosis: metastatic colon cancer with peritoneal carcinomatosis  Treatment: Erbitux(IV) / Binimetinib(po bid) / Encorafenib(po daily) - 14 day cycle    Weight: 141#   Nutrition Symptoms:  none    Met with patient and her  during her chemotherapy infusion appointment.  Note patient has started on a new treatment plan due to progressive disease.  She reports receiving written diet materials mailed to her.  She states her diarrhea has resolved and she denies other nutritional complaints at this time.  She also reports she has made recent diet changes of avoiding / limiting sweets which has resulted in some weight loss but states her weight has leveled off.    Reviewed the importance of good nutrition during her treatment course.  Advised her to be eating smaller more frequent meals/snacks throughout the day with an emphasis on high protein foods and adequate hydration.  Reviewed high protein foods and offered high protein snack ideas.  Also reviewed hydrating fluid options and strongly encouraged her to increase her intake with a goal of 64 ounces daily.    Answered her questions and she voiced understanding of information discussed.  Encouraged her to call RD with questions.  Will monitor as needed during her treatment course.    Risa Cuevas RD  11/14/19

## 2019-12-05 PROBLEM — C78.6 PERITONEAL CARCINOMATOSIS (HCC): Status: ACTIVE | Noted: 2019-01-01

## 2019-12-05 NOTE — PROGRESS NOTES
CHIEF COMPLAINT: Follow up metastatic colon cancer    Problem List:  Oncology/Hematology History    1.  Stage IIIB [id number] moderately differentiated colon cancer  2.   History of breast cancer 2003 treated with chemotherapy and radiation with Dr. Rivers  3.   History of cervical cancer treated with radiation in 1993    -3/25/2019 screening colonoscopy found 5.5 cm tumor at 50 cm near the splenic fracture which was tattooed.  This showed moderately differentiated adenocarcinoma and there was a separate hyperplastic polyp 5 mm at 15 cm.  -3/27/2019 CT chest abdomen pelvis showed tumor at the splenic flexure T3 with transmural invasion and no distant metastases.  Positive gallstones and hiatal hernia.  CEA 2.9 with normal CMP.  -4/3/2019 transverse colectomy to the splenic flexure with primary re-anastomosis revealed moderately differentiated adenocarcinoma extending to the serosa/mesenteric fat with 8 out of 12 lymph nodes involved the largest being 2 cm.  Margins negative.  T3N2B stage IIIb  -4/30/2019 initial medical oncology visit: I reviewed her history.  She has 3 first-degree relatives with breast cancer, thyroid cancer in her family, prostate cancer in her first-degree relatives, and has had 2 other malignancies prior to her colon cancer as outlined above.  I have spoken with Dr. Freddy Esquivel to get mismatch repair testing.  She has seen Dr. Adam Royal who, because of the extent of this next to the spleen feels that localized radiation may be reasonable while not standard.  -5/14/2019 office visit: PCR showed she was mismatch repair proficient; therefore, consider Xeloda radiation followed by 6 months of Xeloda oxaliplatin.  Coordinated this with Dr. Royal who will start radiation May 27.  Following that we will start Xeloda oxaliplatin.  She will take 3 tablets of Xeloda twice a day Monday through Friday with radiation.  She will get a chemotherapy education appointment with our pharmacy doctorate's in  [place].  Though mismatch repair proficient, we will still have her see genetic counseling    -6/5/2019 genetic counseling negative    -Began radiation along with Xeloda 6/4/19 and completed 7/15/19    -7/22/2019 CT chest abdomen and pelvis showed progression of disease with extensive peritoneal carcinomatosis    -7/30/2019 office visit: I reviewed the results and images of her CT chest abdomen pelvis showing peritoneal carcinomatosis.  July 9 CEA elevated at 8.4.  Discussed with patient the plan to continue with the Xeloda oxaliplatin but to add Avastin.  Spoken with surgical oncologist at [facility] during a meeting who performs HIPEC.  He suggested primary chemotherapy and if get a response then send to surgeon accordingly.  I plan to give her Avastin Xeloda oxaliplatin and send strata as this is acting like a BRAF mutated cancer.  If that turns out to be the case, I will switch her to cetuximab, Encorafenib, and Binimetinib.  If we get a peritoneal response without distant metastatic visceral disease of the liver or lung appearing, then I will get her to Dr. Sims at the [facility] for consideration of peritoneal debulking with PCI scoring and possible HI PEC.  She understands the palliative nature of our attempts at this junction.    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  Microsatellite stable.  TMB low.  PD-L1 low.  Potential relevant therapies include Binimetinib + Encorafenib, see Strata report for full list.     -10/28/2019CT chest abdomen pelvis shows previous granulomatous disease with some emphysematous changes, small left pleural effusion with atelectasis or pneumonitis with decrease in size compared to prior CT.  1.3 cm hepatic dome lesion likely related to serosal implant with mild perihepatic ascites.  Has perisplenic carcinomatosis with additional peritoneal carcinomatosis.  Compared to July the peritoneal carcinomatosis has progressed  diffusely including down into the deep pelvis with numerous implants demonstrated.    -10/29/2019 [facility] medical oncology follow-up visit: I reviewed the images and reports of the CT scan above compared to July where there is clear-cut progression.  CEA had come down from 14.5 at the peak end of August to 4.9 as of 10/9/2019, clearly there is peritoneal progression on CT and she has poorly tolerated the Xeloda oxaliplatin Avastin.  Hence we will, based on that B BRAF V600 E mutation, place her on cetuximab, Binimetinib, and Encorafenib which is the most active regimen for this particular mutation and colorectal cancers.  We will check her ejection fraction and have her see my nurse practitioner back in a week to make sure that is doing well and start her into on this regimen and will have my pharmacy staff educate her on this.  Went over the side effects including rashes and diarrhea.  She is currently having diarrhea from the Xeloda and I will is on Lomotil and Imodium which hopefully will subside over the next week.    -11/1/2019 echocardiogram with normal left ventricular systolic function calculated EF 59%.        Overlapping malignant neoplasm of colon (CMS/HCC)    4/30/2019 Initial Diagnosis     Overlapping malignant neoplasm of colon (CMS/HCC)         6/4/2019 - 7/9/2019 Chemotherapy/Radiation     OP COLON Capecitabine 825 mg/m2 M-F               6/5/2019 Genetic Testing      Genetic testing was negative for mutations in BRCA1/2 and all 32 additional genes on the CancerNext Panel.              7/22/2019 Progression     CT chest abdomen and pelvis revealed rather extensive diffuse peritoneal carcinomatosis, at least 15-20 peritoneal nodules measuring between 1 cm in many as large as 2-3 cm scattered throughout the peritoneal cavity, significantly progressed from earlier this year.  No metastasis in the chest.  Small left pleural effusion with associated mild left bibasilar atelectasis, otherwise clear  lungs.         8/8/2019 - 10/21/2019 Chemotherapy     OP COLON CapeOX+ Bevacizumab (Capecitabine / OXALIplatin / Bevacizumab)           10/28/2019 Imaging     CT chest abdomen pelvis shows previous granulomatous disease with some emphysematous changes, small left pleural effusion with atelectasis or pneumonitis with decrease in size compared to prior CT.  1.3 cm hepatic dome lesion likely related to serosal implant with mild perihepatic ascites.  Has perisplenic carcinomatosis with additional peritoneal carcinomatosis.  Compared to July the peritoneal carcinomatosis has progressed diffusely including down into the deep pelvis with numerous implants demonstrated.         11/1/2019 -  Other Event     Echocardiogram  · Left ventricular systolic function is normal.  · Calculated EF = 59.0%. Normal Global Longitudinal LV strain = -20.5%.  · Left ventricular diastolic dysfunction (grade II) consistent with pseudonormalization.  · Left atrial cavity size is mildly dilated.            HISTORY OF PRESENT ILLNESS:  The patient is a 64 y.o. female, here for follow up on management of metastatic colon cancer with peritoneal carcinomatosis.  The patient continues to overall do well.  She is tolerating therapy with weekly cetuximab and Binimetinib and Encorafenib.  Mild erythema on the tip of her nose and a few acneiform lesions on her chest wall but basically no rash.  Appetite is good.  Weight is stable.  She denies any diarrhea.  Mild fatigue.      History reviewed. No pertinent past medical history.  Past Surgical History:   Procedure Laterality Date   • BREAST LUMPECTOMY Left 2003    [facility]   • [place]  [place]    [facility] [place]       Allergies   Allergen Reactions   • Penicillins Nausea And Vomiting       Family History and Social History reviewed and changed as necessary      REVIEW OF SYSTEM:   Review of Systems   Constitutional: Negative for appetite change, chills, diaphoresis, fever and unexpected weight  change.  Positive for mild fatigue.  HENT:   Negative for mouth sores, sore throat and trouble swallowing.    Eyes: Negative for icterus.   Respiratory: Negative for cough, hemoptysis and shortness of breath.    Cardiovascular: Negative for chest pain, leg swelling and palpitations.   Gastrointestinal: Negative for abdominal distention, abdominal pain, blood in stool, constipation, diarrhea, nausea and vomiting.   Endocrine: Negative for hot flashes.   Genitourinary: Negative for bladder incontinence, difficulty urinating, dysuria, frequency and hematuria.    Musculoskeletal: Negative for gait problem, neck pain and neck stiffness.   Skin: Negative for rash.   Neurological: Negative for dizziness, gait problem, headaches, light-headedness and numbness.   Hematological: Negative for adenopathy. Does not bruise/bleed easily.   Psychiatric/Behavioral: Negative for depression. The patient is not nervous/anxious.    All other systems reviewed and are negative.       PHYSICAL EXAM    Vitals:    12/05/19 1028   BP: 110/65   Pulse: 112   Resp: 18   Temp: 98.8 °F (37.1 °C)   Weight: 65.8 kg (145 lb)     Constitutional: Appears well-developed and well-nourished. No distress.   ECOG: (1) Restricted in physically strenuous activity, ambulatory and able to do work of light nature  HENT:   Head: Normocephalic.   Mouth/Throat: Oropharynx is clear and moist.   Eyes: Conjunctivae are normal. Pupils are equal, round, and reactive. No scleral icterus.   Neck: Neck supple. No JVD present. No thyromegaly present.   Pulmonary/Chest: No respiratory distress.   Abdominal: Soft. Exhibits no distension.   Neurological: Alert and oriented to person, place, and time. Exhibits normal muscle tone.   Skin: No ecchymosis, no petechiae and no rash noted. Not diaphoretic. No cyanosis. Nails show no clubbing.   Psychiatric: Normal mood and affect.   Vitals reviewed.  Labs reviewed.    Lab Results   Component Value Date    HGB 10.7 (L) 12/04/2019     HCT 32.3 (L) 12/04/2019     (H) 12/04/2019     12/04/2019    WBC 5.2 12/04/2019    NEUTROABS 3.8 12/04/2019    LYMPHSABS 0.5 (L) 12/04/2019    MONOSABS 0.4 12/04/2019    EOSABS 0.5 (H) 12/04/2019    BASOSABS 0.1 12/04/2019       Lab Results   Component Value Date    GLUCOSE 143 (H) 11/14/2019    BUN 12 12/04/2019    CREATININE 0.67 12/04/2019     12/04/2019    K 3.6 12/04/2019     (H) 12/04/2019    CO2 18 (L) 12/04/2019    CALCIUM 9.4 12/04/2019    PROTEINTOT 7.2 11/14/2019    ALBUMIN 3.6 12/04/2019    BILITOT <0.2 12/04/2019    ALKPHOS 123 (H) 12/04/2019    AST 25 12/04/2019    ALT 31 12/04/2019         ASSESSMENT & PLAN:    1.  Stage IIIB [id number] moderately differentiated colon cancer with subsequent peritoneal carcinomatosis BRAF V600E mutated  2.   History of breast cancer 2003 treated with chemotherapy and radiation with Dr. Rivers  3.   History of cervical cancer treated with radiation in 1993    -3/25/2019 screening colonoscopy found 5.5 cm tumor at 50 cm near the splenic fracture which was tattooed.  This showed moderately differentiated adenocarcinoma and there was a separate hyperplastic polyp 5 mm at 15 cm.  -3/27/2019 CT chest abdomen pelvis showed tumor at the splenic flexure T3 with transmural invasion and no distant metastases.  Positive gallstones and hiatal hernia.  CEA 2.9 with normal CMP.  -4/3/2019 transverse colectomy to the splenic flexure with primary re-anastomosis revealed moderately differentiated adenocarcinoma extending to the serosa/mesenteric fat with 8 out of 12 lymph nodes involved the largest being 2 cm.  Margins negative.  T3N2B stage IIIb  -4/30/2019 initial medical oncology visit: I reviewed her history.  She has 3 first-degree relatives with breast cancer, thyroid cancer in her family, prostate cancer in her first-degree relatives, and has had 2 other malignancies prior to her colon cancer as outlined above.  I have spoken with Dr. Freddy Esquivel to get  mismatch repair testing.  She has seen Dr. Adam Royal who, because of the extent of this next to the spleen feels that localized radiation may be reasonable while not standard.  -5/14/2019 office visit: PCR showed she was mismatch repair proficient; therefore, consider Xeloda radiation followed by 6 months of Xeloda oxaliplatin.  Coordinated this with Dr. Royal who will start radiation May 27.  Following that we will start Xeloda oxaliplatin.  She will take 3 tablets of Xeloda twice a day Monday through Friday with radiation.  She will get a chemotherapy education appointment with our pharmacy doctorate's in [place].  Though mismatch repair proficient, we will still have her see genetic counseling    -6/5/2019 genetic counseling negative    -Began radiation along with Xeloda 6/4/19 and completed 7/15/19    -7/22/2019 CT chest abdomen and pelvis showed progression of disease with extensive peritoneal carcinomatosis    -7/30/2019 office visit: I reviewed the results and images of her CT chest abdomen pelvis showing peritoneal carcinomatosis.  July 9 CEA elevated at 8.4.  Discussed with patient the plan to continue with the Xeloda oxaliplatin but to add Avastin.  Spoken with surgical oncologist at [facility] during a meeting who performs HIPEC.  He suggested primary chemotherapy and if get a response then send to surgeon accordingly.  I plan to give her Avastin Xeloda oxaliplatin and send strata as this is acting like a BRAF mutated cancer.  If that turns out to be the case, I will switch her to cetuximab, Encorafenib, and Binimetinib.  If we get a peritoneal response without distant metastatic visceral disease of the liver or lung appearing, then [facility] with PCI scoring and possible HI PEC.  She understands the palliative nature of our attempts at this junction.    -8/28/2019 Strata report revealed BRAF p.V600E  mutation and Tp53 p.E285K mutation.  Microsatellite stable.  TMB low.  PD-L1 low.  Potential relevant therapies include Binimetinib + Encorafenib, see Strata report for full list.     -10/28/2019CT chest abdomen pelvis shows previous granulomatous disease with some emphysematous changes, small left pleural effusion with atelectasis or pneumonitis with decrease in size compared to prior CT.  1.3 cm hepatic dome lesion likely related to serosal implant with mild perihepatic ascites.  Has perisplenic carcinomatosis with additional peritoneal carcinomatosis.  Compared to July the peritoneal carcinomatosis has progressed diffusely including down into the deep pelvis with numerous implants demonstrated.    -10/29/2019 [facility] medical oncology follow-up visit: I reviewed the images and reports of the CT scan above compared to July where there is clear-cut progression.  CEA had come down from 14.5 at the peak end of August to 4.9 as of 10/9/2019, clearly there is peritoneal progression on CT and she has poorly tolerated the Xeloda oxaliplatin Avastin.  Hence we will, based on that B BRAF V600 E mutation, place her on cetuximab, Binimetinib, and Encorafenib which is the most active regimen for this particular mutation and colorectal cancers.  We will check her ejection fraction and have her see my nurse practitioner back in a week to make sure that is doing well and start her into on this regimen and will have my pharmacy staff educate her on this.  Went over the side effects including rashes and diarrhea.  She is currently having diarrhea from the Xeloda and I will is on Lomotil and Imodium which hopefully will subside over the next week.    -11/6/2019 [facility] medical oncology clinic visit:  The patient is feeling much better a few weeks out from any chemotherapy.  She is awaiting shipment of her oral chemotherapy.  Echocardiogram on 11/1/2019 showed normal left ventricular systolic function with calculated EF 59%.  She has  been working with our pharmacist and oral chemotherapy team and has been able to obtain free drug from the manufacture and should receive shipment of Binimetinib and Encorafenib hopefully this week.  She is set to begin cetuximab next Thursday in [place].  I will see her back early December with her third cycle to see how she is doing.    -12/5/2019 [facility] oncology clinic visit: The patient continues to overall do well.  She is tolerating therapy with weekly cetuximab and Binimetinib and Encorafenib.  Mild erythema on the tip of her nose and a few acneiform lesions on her chest wall but basically no rash.  We will continue therapy unchanged.  Her counts have been unremarkable.  We will repeat her echocardiogram in the next week or so to monitor while on therapy per protocol.  Currently with no symptoms suggestive of cardiac issues.  Plan on restaging scans after about 3 months of therapy as long as she is tolerating.  I will see her back in 3 weeks for follow-up.    I spent a total of 15 minutes in direct patient care, greater than 10  minutes (greater than 50%) were spent in coordination of care, and counseling the patient regarding  (diagnosis) . Answered any questions patient had regarding medications and plan of care.     Itzel Curran, APRN    12/05/2019

## 2019-12-16 NOTE — TELEPHONE ENCOUNTER
Discussed with Dr. Menchaca.  Patient advised to skipped missed dose and take only the evening dose today.  Verbalized understanding.

## 2019-12-16 NOTE — TELEPHONE ENCOUNTER
Pt called about the mektovi 15mg. She missed her morning dose (3 pills) so she's wondering if she should go ahead and take it now and take more again this evening, or just wait until her evening dose and get back on track tomorrow.     Please give pt a call at

## 2019-12-26 NOTE — PROGRESS NOTES
CHIEF COMPLAINT: Follow up metastatic colon cancer    Problem List:  Oncology/Hematology History    1.  Stage IIIB [id number] moderately differentiated colon cancer  2.   History of breast cancer 2003 treated with chemotherapy and radiation with Dr. Rivers  3.   History of cervical cancer treated with radiation in 1993    -3/25/2019 screening colonoscopy found 5.5 cm tumor at 50 cm near the splenic fracture which was tattooed.  This showed moderately differentiated adenocarcinoma and there was a separate hyperplastic polyp 5 mm at 15 cm.  -3/27/2019 CT chest abdomen pelvis showed tumor at the splenic flexure T3 with transmural invasion and no distant metastases.  Positive gallstones and hiatal hernia.  CEA 2.9 with normal CMP.  -4/3/2019 transverse colectomy to the splenic flexure with primary re-anastomosis revealed moderately differentiated adenocarcinoma extending to the serosa/mesenteric fat with 8 out of 12 lymph nodes involved the largest being 2 cm.  Margins negative.  T3N2B stage IIIb  -4/30/2019 initial medical oncology visit: I reviewed her history.  She has 3 first-degree relatives with breast cancer, thyroid cancer in her family, prostate cancer in her first-degree relatives, and has had 2 other malignancies prior to her colon cancer as outlined above.  I have spoken with Dr. Freddy Esquivel to get mismatch repair testing.  She has seen Dr. Adam Royal who, because of the extent of this next to the spleen feels that localized radiation may be reasonable while not standard.  -5/14/2019 office visit: PCR showed she was mismatch repair proficient; therefore, consider Xeloda radiation followed by 6 months of Xeloda oxaliplatin.  Coordinated this with Dr. Royal who will start radiation May 27.  Following that we will start Xeloda oxaliplatin.  She will take 3 tablets of Xeloda twice a day Monday through Friday with radiation.  She will get a chemotherapy education appointment with our pharmacy doctorate's in  [place].  Though mismatch repair proficient, we will still have her see genetic counseling    -6/5/2019 genetic counseling negative    -Began radiation along with Xeloda 6/4/19 and completed 7/15/19    -7/22/2019 CT chest abdomen and pelvis showed progression of disease with extensive peritoneal carcinomatosis    -7/30/2019 office visit: I reviewed the results and images of her CT chest abdomen pelvis showing peritoneal carcinomatosis.  July 9 CEA elevated at 8.4.  Discussed with patient the plan to continue with the Xeloda oxaliplatin but to add Avastin.  Spoken with surgical oncologist at [facility] during a meeting who performs HIPEC.  He suggested primary chemotherapy and if get a response then send to surgeon accordingly.  I plan to give her Avastin Xeloda oxaliplatin and send strata as this is acting like a BRAF mutated cancer.  If that turns out to be the case, I will switch her to cetuximab, Encorafenib, and Binimetinib.  If we get a peritoneal response without distant metastatic visceral disease of the liver or lung appearing, then I will get her to Dr. Sims at the [facility] for consideration of peritoneal debulking with PCI scoring and possible HI PEC.  She understands the palliative nature of our attempts at this junction.    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  Microsatellite stable.  TMB low.  PD-L1 low.  Potential relevant therapies include Binimetinib + Encorafenib, see Strata report for full list.     -10/28/2019CT chest abdomen pelvis shows previous granulomatous disease with some emphysematous changes, small left pleural effusion with atelectasis or pneumonitis with decrease in size compared to prior CT.  1.3 cm hepatic dome lesion likely related to serosal implant with mild perihepatic ascites.  Has perisplenic carcinomatosis with additional peritoneal carcinomatosis.  Compared to July the peritoneal carcinomatosis has progressed  diffusely including down into the deep pelvis with numerous implants demonstrated.    -10/29/2019 [facility] medical oncology follow-up visit: I reviewed the images and reports of the CT scan above compared to July where there is clear-cut progression.  CEA had come down from 14.5 at the peak end of August to 4.9 as of 10/9/2019, clearly there is peritoneal progression on CT and she has poorly tolerated the Xeloda oxaliplatin Avastin.  Hence we will, based on that B BRAF V600 E mutation, place her on cetuximab, Binimetinib, and Encorafenib which is the most active regimen for this particular mutation and colorectal cancers.  We will check her ejection fraction and have her see my nurse practitioner back in a week to make sure that is doing well and start her into on this regimen and will have my pharmacy staff educate her on this.  Went over the side effects including rashes and diarrhea.  She is currently having diarrhea from the Xeloda and I will is on Lomotil and Imodium which hopefully will subside over the next week.    -11/1/2019 echocardiogram with normal left ventricular systolic function calculated EF 59%.        Overlapping malignant neoplasm of colon (CMS/HCC)    4/30/2019 Initial Diagnosis     Overlapping malignant neoplasm of colon (CMS/HCC)      6/4/2019 - 7/9/2019 Chemotherapy/Radiation     OP COLON Capecitabine 825 mg/m2 M-F            6/5/2019 Genetic Testing      Genetic testing was negative for mutations in BRCA1/2 and all 32 additional genes on the CancerNext Panel.           7/22/2019 Progression     CT chest abdomen and pelvis revealed rather extensive diffuse peritoneal carcinomatosis, at least 15-20 peritoneal nodules measuring between 1 cm in many as large as 2-3 cm scattered throughout the peritoneal cavity, significantly progressed from earlier this year.  No metastasis in the chest.  Small left pleural effusion with associated mild left bibasilar atelectasis, otherwise clear lungs.       8/8/2019 - 10/21/2019 Chemotherapy     OP COLON CapeOX+ Bevacizumab (Capecitabine / OXALIplatin / Bevacizumab)        10/28/2019 Progression     Compared to July the peritoneal carcinomatosis has progressed diffusely including down into the deep pelvis with numerous implants demonstrated.        10/28/2019 Imaging     CT chest abdomen pelvis shows previous granulomatous disease with some emphysematous changes, small left pleural effusion with atelectasis or pneumonitis with decrease in size compared to prior CT.  1.3 cm hepatic dome lesion likely related to serosal implant with mild perihepatic ascites.  Has perisplenic carcinomatosis with additional peritoneal carcinomatosis.  Compared to July the peritoneal carcinomatosis has progressed diffusely including down into the deep pelvis with numerous implants demonstrated.      11/1/2019 -  Other Event     Echocardiogram  · Left ventricular systolic function is normal.  · Calculated EF = 59.0%. Normal Global Longitudinal LV strain = -20.5%.  · Left ventricular diastolic dysfunction (grade II) consistent with pseudonormalization.  · Left atrial cavity size is mildly dilated.      11/14/2019 -  Chemotherapy     OP COLORECTAL Cetuximab / Binimetinib / Encorafenib         12/11/2019 -  Other Event     Echocardiogram  · Left ventricular systolic function is normal.  · Calculated EF = 72.0%  · Normal global longitudinal strain.         HISTORY OF PRESENT ILLNESS:  The patient is a 64 y.o. female, here for follow up on management of metastatic colon cancer with peritoneal carcinomatosis.  The patient continues to do well overall tolerating therapy with Cetuximab weekly, along with Binimetinib and Encorafenib.  No notable acneiform lesions or rash.  She does have some small cracks on a few fingers.  Appetite is good.  Weight is stable.  She denies any diarrhea.  Mild fatigue.  Had one episode of vomiting after drinking a diet Pepsi but none since.      History reviewed. No  pertinent past medical history.  Past Surgical History:   Procedure Laterality Date   • BRE[facility]Y [facility]   • COLON SURGERY  2019    [facility] [place]. Hammer       Allergies   Allergen Reactions   • Penicillins Nausea And Vomiting       Family History and Social History reviewed and changed as necessary      REVIEW OF SYSTEM:   Review of Systems   Constitutional: Negative for appetite change, chills, diaphoresis, fever and unexpected weight change.  Positive for mild fatigue.  HENT:   Negative for mouth sores, sore throat and trouble swallowing.    Eyes: Negative for icterus.   Respiratory: Negative for cough, hemoptysis and shortness of breath.    Cardiovascular: Negative for chest pain, leg swelling and palpitations.   Gastrointestinal: Negative for abdominal distention, abdominal pain, blood in stool, constipation, diarrhea, nausea and vomiting.   Endocrine: Negative for hot flashes.   Genitourinary: Negative for bladder incontinence, difficulty urinating, dysuria, frequency and hematuria.    Musculoskeletal: Negative for gait problem, neck pain and neck stiffness.   Skin: Negative for rash. Positive for dry skin on the hands and a few small cracks.  Neurological: Negative for dizziness, gait problem, headaches, light-headedness and numbness.   Hematological: Negative for adenopathy. Does not bruise/bleed easily.   Psychiatric/Behavioral: Negative for depression. The patient is not nervous/anxious.    All other systems reviewed and are negative.       PHYSICAL EXAM    Vitals:    12/26/19 0958   BP: 131/68   Pulse: 88   Resp: 18   Temp: 98.2 °F (36.8 °C)   Weight: 65.3 kg (144 lb)     Constitutional: Appears well-developed and well-nourished. No distress.   ECOG: (1) Restricted in physically strenuous activity, ambulatory and able to do work of light nature  HENT:   Head: Normocephalic.   Mouth/Throat: Oropharynx is clear and moist.   Eyes: Conjunctivae are normal. Pupils are equal, round, and  reactive. No scleral icterus.   Neck: Neck supple. No JVD present. No thyromegaly present.   Pulmonary/Chest: No respiratory distress.   Abdominal: Soft. Exhibits no distension.   Neurological: Alert and oriented to person, place, and time. Exhibits normal muscle tone.   Skin: No ecchymosis, no petechiae and no rash noted. Not diaphoretic. No cyanosis. Nails show no clubbing. Skin on the hands is a little dry, a few very tiny cracks at the DIP joints and MCP joint.  Psychiatric: Normal mood and affect.   Vitals reviewed.  Labs reviewed.    Lab Results   Component Value Date    HGB 11.0 (L) 12/18/2019    HCT 33.2 (L) 12/18/2019    MCV 99 (H) 12/18/2019     12/18/2019    WBC 5.5 12/18/2019    NEUTROABS 4.1 12/18/2019    LYMPHSABS 0.5 (L) 12/18/2019    MONOSABS 0.4 12/18/2019    EOSABS 0.4 12/18/2019    BASOSABS 0.0 12/18/2019       Lab Results   Component Value Date    GLUCOSE 143 (H) 11/14/2019    BUN 10 12/18/2019    CREATININE 0.57 12/18/2019     12/18/2019    K 3.8 12/18/2019     12/18/2019    CO2 22 12/18/2019    CALCIUM 9.1 12/18/2019    PROTEINTOT 7.2 11/14/2019    ALBUMIN 3.7 12/18/2019    BILITOT <0.2 12/18/2019    ALKPHOS 102 12/18/2019    AST 20 12/18/2019    ALT 20 12/18/2019         ASSESSMENT & PLAN:    1.  Stage IIIB [id number] moderately differentiated colon cancer with subsequent peritoneal carcinomatosis BRAF V600E mutated  2.   History of breast cancer 2003 treated with chemotherapy and radiation with Dr. Rivers  3.   History of cervical cancer treated with radiation in 1993  4.  Dry skin on the hands    Discussion: The patient continues to overall do well.  She is tolerating therapy with weekly cetuximab along with Binimetinib and Encorafenib.  Currently no acneform lesions or rash.  The skin on her hands is a little dry and she has a few very small cracks at the joints, I recommended she use a more emollient lotion is currently she has only been using aloe vera gel.  We will  continue therapy unchanged.  Her counts have been unremarkable.  Current echocardiogram is normal, will continue to monitor while on therapy per protocol.  Currently with no symptoms suggestive of cardiac issues.  Plan on restaging scans in February which would be about 3 months of therapy as long as she is tolerating.  I will see her back in 1 month for follow-up.    I spent a total of 15 minutes in direct patient care, greater than 8  minutes (greater than 50%) were spent in coordination of care, and counseling the patient regarding  (diagnosis) . Answered any questions patient had regarding medications and plan of care.     Itzel Curran, APRN    12/26/2019

## 2020-01-01 ENCOUNTER — SPECIALTY PHARMACY (OUTPATIENT)
Dept: ONCOLOGY | Facility: HOSPITAL | Age: 65
End: 2020-01-01

## 2020-01-01 ENCOUNTER — OFFICE VISIT (OUTPATIENT)
Dept: ONCOLOGY | Facility: CLINIC | Age: 65
End: 2020-01-01

## 2020-01-01 ENCOUNTER — RESULTS ENCOUNTER (OUTPATIENT)
Dept: ONCOLOGY | Facility: CLINIC | Age: 65
End: 2020-01-01

## 2020-01-01 ENCOUNTER — TELEPHONE (OUTPATIENT)
Dept: ONCOLOGY | Facility: CLINIC | Age: 65
End: 2020-01-01

## 2020-01-01 ENCOUNTER — TELEPHONE (OUTPATIENT)
Dept: NUTRITION | Facility: HOSPITAL | Age: 65
End: 2020-01-01

## 2020-01-01 ENCOUNTER — INFUSION (OUTPATIENT)
Dept: ONCOLOGY | Facility: HOSPITAL | Age: 65
End: 2020-01-01

## 2020-01-01 ENCOUNTER — APPOINTMENT (OUTPATIENT)
Dept: ONCOLOGY | Facility: HOSPITAL | Age: 65
End: 2020-01-01

## 2020-01-01 ENCOUNTER — HOSPITAL ENCOUNTER (EMERGENCY)
Facility: HOSPITAL | Age: 65
Discharge: HOME | End: 2020-04-30
Attending: EMERGENCY MEDICINE | Admitting: EMERGENCY MEDICINE

## 2020-01-01 ENCOUNTER — APPOINTMENT (OUTPATIENT)
Dept: GENERAL RADIOLOGY | Facility: HOSPITAL | Age: 65
End: 2020-01-01

## 2020-01-01 ENCOUNTER — HOSPITAL ENCOUNTER (OUTPATIENT)
Dept: ONCOLOGY | Facility: HOSPITAL | Age: 65
Setting detail: INFUSION SERIES
Discharge: HOME | End: 2020-02-14

## 2020-01-01 ENCOUNTER — APPOINTMENT (OUTPATIENT)
Dept: CT IMAGING | Facility: HOSPITAL | Age: 65
End: 2020-01-01

## 2020-01-01 VITALS
BODY MASS INDEX: 23.37 KG/M2 | RESPIRATION RATE: 20 BRPM | DIASTOLIC BLOOD PRESSURE: 67 MMHG | TEMPERATURE: 97.9 F | SYSTOLIC BLOOD PRESSURE: 129 MMHG | HEART RATE: 84 BPM | WEIGHT: 144.8 LBS

## 2020-01-01 VITALS
HEART RATE: 104 BPM | WEIGHT: 144.6 LBS | TEMPERATURE: 98.5 F | SYSTOLIC BLOOD PRESSURE: 118 MMHG | RESPIRATION RATE: 16 BRPM | DIASTOLIC BLOOD PRESSURE: 66 MMHG | BODY MASS INDEX: 23.34 KG/M2

## 2020-01-01 VITALS
RESPIRATION RATE: 18 BRPM | TEMPERATURE: 98.1 F | WEIGHT: 141 LBS | OXYGEN SATURATION: 97 % | SYSTOLIC BLOOD PRESSURE: 154 MMHG | HEART RATE: 85 BPM | BODY MASS INDEX: 22.76 KG/M2 | DIASTOLIC BLOOD PRESSURE: 89 MMHG

## 2020-01-01 VITALS
OXYGEN SATURATION: 97 % | DIASTOLIC BLOOD PRESSURE: 92 MMHG | WEIGHT: 136 LBS | HEART RATE: 114 BPM | SYSTOLIC BLOOD PRESSURE: 156 MMHG | BODY MASS INDEX: 21.95 KG/M2 | TEMPERATURE: 97.5 F | RESPIRATION RATE: 18 BRPM

## 2020-01-01 VITALS
TEMPERATURE: 98.5 F | DIASTOLIC BLOOD PRESSURE: 60 MMHG | BODY MASS INDEX: 23.36 KG/M2 | SYSTOLIC BLOOD PRESSURE: 132 MMHG | HEART RATE: 92 BPM | WEIGHT: 144.7 LBS | RESPIRATION RATE: 20 BRPM

## 2020-01-01 VITALS
BODY MASS INDEX: 22.85 KG/M2 | WEIGHT: 141.6 LBS | TEMPERATURE: 97.6 F | SYSTOLIC BLOOD PRESSURE: 147 MMHG | HEART RATE: 84 BPM | DIASTOLIC BLOOD PRESSURE: 65 MMHG | RESPIRATION RATE: 20 BRPM

## 2020-01-01 VITALS
HEIGHT: 66 IN | TEMPERATURE: 97.6 F | BODY MASS INDEX: 23.3 KG/M2 | RESPIRATION RATE: 14 BRPM | SYSTOLIC BLOOD PRESSURE: 126 MMHG | DIASTOLIC BLOOD PRESSURE: 70 MMHG | WEIGHT: 145 LBS | OXYGEN SATURATION: 99 % | HEART RATE: 70 BPM

## 2020-01-01 VITALS
WEIGHT: 144.2 LBS | TEMPERATURE: 98.3 F | SYSTOLIC BLOOD PRESSURE: 162 MMHG | DIASTOLIC BLOOD PRESSURE: 89 MMHG | RESPIRATION RATE: 16 BRPM | BODY MASS INDEX: 23.27 KG/M2 | HEART RATE: 82 BPM

## 2020-01-01 VITALS
HEART RATE: 84 BPM | DIASTOLIC BLOOD PRESSURE: 69 MMHG | SYSTOLIC BLOOD PRESSURE: 150 MMHG | WEIGHT: 140.2 LBS | BODY MASS INDEX: 22.63 KG/M2 | RESPIRATION RATE: 16 BRPM | TEMPERATURE: 98.8 F

## 2020-01-01 VITALS
HEART RATE: 89 BPM | BODY MASS INDEX: 23.34 KG/M2 | WEIGHT: 144.6 LBS | TEMPERATURE: 98.3 F | DIASTOLIC BLOOD PRESSURE: 71 MMHG | SYSTOLIC BLOOD PRESSURE: 138 MMHG | RESPIRATION RATE: 16 BRPM

## 2020-01-01 VITALS
TEMPERATURE: 98.3 F | DIASTOLIC BLOOD PRESSURE: 89 MMHG | HEART RATE: 84 BPM | RESPIRATION RATE: 20 BRPM | WEIGHT: 141.2 LBS | BODY MASS INDEX: 22.79 KG/M2 | SYSTOLIC BLOOD PRESSURE: 157 MMHG

## 2020-01-01 VITALS
SYSTOLIC BLOOD PRESSURE: 137 MMHG | HEART RATE: 87 BPM | WEIGHT: 143.2 LBS | DIASTOLIC BLOOD PRESSURE: 71 MMHG | RESPIRATION RATE: 18 BRPM | TEMPERATURE: 98.2 F | BODY MASS INDEX: 23.11 KG/M2

## 2020-01-01 VITALS
HEART RATE: 113 BPM | TEMPERATURE: 99 F | OXYGEN SATURATION: 97 % | BODY MASS INDEX: 23.14 KG/M2 | HEIGHT: 66 IN | WEIGHT: 144 LBS | RESPIRATION RATE: 18 BRPM

## 2020-01-01 VITALS
DIASTOLIC BLOOD PRESSURE: 57 MMHG | BODY MASS INDEX: 23.18 KG/M2 | SYSTOLIC BLOOD PRESSURE: 139 MMHG | RESPIRATION RATE: 16 BRPM | HEART RATE: 96 BPM | WEIGHT: 143.6 LBS | TEMPERATURE: 98.6 F

## 2020-01-01 VITALS
SYSTOLIC BLOOD PRESSURE: 147 MMHG | RESPIRATION RATE: 18 BRPM | WEIGHT: 144 LBS | HEART RATE: 86 BPM | DIASTOLIC BLOOD PRESSURE: 86 MMHG | TEMPERATURE: 98.6 F | BODY MASS INDEX: 23.24 KG/M2

## 2020-01-01 VITALS
WEIGHT: 141 LBS | HEIGHT: 66 IN | RESPIRATION RATE: 16 BRPM | TEMPERATURE: 98.5 F | HEART RATE: 90 BPM | OXYGEN SATURATION: 96 % | DIASTOLIC BLOOD PRESSURE: 69 MMHG | BODY MASS INDEX: 22.66 KG/M2 | SYSTOLIC BLOOD PRESSURE: 148 MMHG

## 2020-01-01 VITALS
WEIGHT: 138.2 LBS | DIASTOLIC BLOOD PRESSURE: 89 MMHG | BODY MASS INDEX: 22.31 KG/M2 | TEMPERATURE: 97.9 F | RESPIRATION RATE: 18 BRPM | SYSTOLIC BLOOD PRESSURE: 156 MMHG | HEART RATE: 110 BPM

## 2020-01-01 VITALS
HEART RATE: 101 BPM | DIASTOLIC BLOOD PRESSURE: 56 MMHG | SYSTOLIC BLOOD PRESSURE: 129 MMHG | RESPIRATION RATE: 18 BRPM | HEART RATE: 89 BPM | WEIGHT: 145 LBS | TEMPERATURE: 97.8 F | DIASTOLIC BLOOD PRESSURE: 73 MMHG | RESPIRATION RATE: 18 BRPM | TEMPERATURE: 98.3 F | BODY MASS INDEX: 23.4 KG/M2 | WEIGHT: 141.2 LBS | BODY MASS INDEX: 22.79 KG/M2 | SYSTOLIC BLOOD PRESSURE: 114 MMHG

## 2020-01-01 VITALS
BODY MASS INDEX: 23.44 KG/M2 | DIASTOLIC BLOOD PRESSURE: 79 MMHG | HEART RATE: 89 BPM | RESPIRATION RATE: 20 BRPM | WEIGHT: 145.2 LBS | SYSTOLIC BLOOD PRESSURE: 131 MMHG | TEMPERATURE: 98.4 F

## 2020-01-01 VITALS
DIASTOLIC BLOOD PRESSURE: 66 MMHG | HEART RATE: 86 BPM | WEIGHT: 145.2 LBS | TEMPERATURE: 97.7 F | BODY MASS INDEX: 23.44 KG/M2 | SYSTOLIC BLOOD PRESSURE: 114 MMHG | RESPIRATION RATE: 16 BRPM

## 2020-01-01 VITALS
HEART RATE: 91 BPM | WEIGHT: 146.4 LBS | BODY MASS INDEX: 23.63 KG/M2 | DIASTOLIC BLOOD PRESSURE: 72 MMHG | SYSTOLIC BLOOD PRESSURE: 131 MMHG | TEMPERATURE: 98.2 F | RESPIRATION RATE: 18 BRPM

## 2020-01-01 VITALS
BODY MASS INDEX: 23.34 KG/M2 | WEIGHT: 144.6 LBS | DIASTOLIC BLOOD PRESSURE: 78 MMHG | HEART RATE: 87 BPM | RESPIRATION RATE: 20 BRPM | TEMPERATURE: 98.9 F | SYSTOLIC BLOOD PRESSURE: 153 MMHG

## 2020-01-01 VITALS
SYSTOLIC BLOOD PRESSURE: 181 MMHG | TEMPERATURE: 98.4 F | BODY MASS INDEX: 22.82 KG/M2 | HEIGHT: 66 IN | DIASTOLIC BLOOD PRESSURE: 83 MMHG | HEART RATE: 93 BPM | WEIGHT: 142 LBS | RESPIRATION RATE: 18 BRPM

## 2020-01-01 VITALS
BODY MASS INDEX: 22.5 KG/M2 | SYSTOLIC BLOOD PRESSURE: 165 MMHG | HEART RATE: 105 BPM | TEMPERATURE: 99.3 F | HEIGHT: 66 IN | OXYGEN SATURATION: 95 % | WEIGHT: 140 LBS | RESPIRATION RATE: 18 BRPM | DIASTOLIC BLOOD PRESSURE: 109 MMHG

## 2020-01-01 VITALS
RESPIRATION RATE: 20 BRPM | HEART RATE: 90 BPM | BODY MASS INDEX: 23.34 KG/M2 | DIASTOLIC BLOOD PRESSURE: 73 MMHG | SYSTOLIC BLOOD PRESSURE: 132 MMHG | TEMPERATURE: 99 F | WEIGHT: 144.6 LBS

## 2020-01-01 VITALS
DIASTOLIC BLOOD PRESSURE: 71 MMHG | HEART RATE: 102 BPM | WEIGHT: 138 LBS | OXYGEN SATURATION: 97 % | RESPIRATION RATE: 22 BRPM | HEIGHT: 66 IN | TEMPERATURE: 99 F | BODY MASS INDEX: 22.18 KG/M2 | SYSTOLIC BLOOD PRESSURE: 137 MMHG

## 2020-01-01 VITALS
TEMPERATURE: 98.3 F | DIASTOLIC BLOOD PRESSURE: 86 MMHG | WEIGHT: 141.2 LBS | BODY MASS INDEX: 22.79 KG/M2 | SYSTOLIC BLOOD PRESSURE: 137 MMHG | HEART RATE: 82 BPM | RESPIRATION RATE: 18 BRPM

## 2020-01-01 VITALS
DIASTOLIC BLOOD PRESSURE: 69 MMHG | WEIGHT: 144.2 LBS | TEMPERATURE: 98.2 F | SYSTOLIC BLOOD PRESSURE: 142 MMHG | RESPIRATION RATE: 20 BRPM | BODY MASS INDEX: 23.27 KG/M2 | HEART RATE: 88 BPM

## 2020-01-01 VITALS
RESPIRATION RATE: 18 BRPM | TEMPERATURE: 98 F | HEART RATE: 92 BPM | WEIGHT: 143.2 LBS | SYSTOLIC BLOOD PRESSURE: 133 MMHG | DIASTOLIC BLOOD PRESSURE: 60 MMHG | BODY MASS INDEX: 23.11 KG/M2

## 2020-01-01 VITALS
SYSTOLIC BLOOD PRESSURE: 131 MMHG | RESPIRATION RATE: 20 BRPM | HEART RATE: 79 BPM | TEMPERATURE: 98.6 F | DIASTOLIC BLOOD PRESSURE: 66 MMHG

## 2020-01-01 VITALS — SYSTOLIC BLOOD PRESSURE: 144 MMHG | DIASTOLIC BLOOD PRESSURE: 84 MMHG

## 2020-01-01 DIAGNOSIS — Z45.2 ENCOUNTER FOR VENOUS ACCESS DEVICE CARE: ICD-10-CM

## 2020-01-01 DIAGNOSIS — C18.8 OVERLAPPING MALIGNANT NEOPLASM OF COLON (HCC): Primary | ICD-10-CM

## 2020-01-01 DIAGNOSIS — C18.8 OVERLAPPING MALIGNANT NEOPLASM OF COLON (HCC): Primary | Chronic | ICD-10-CM

## 2020-01-01 DIAGNOSIS — C18.8 OVERLAPPING MALIGNANT NEOPLASM OF COLON (HCC): ICD-10-CM

## 2020-01-01 DIAGNOSIS — R11.2 CHEMOTHERAPY INDUCED NAUSEA AND VOMITING: ICD-10-CM

## 2020-01-01 DIAGNOSIS — Z45.2 ENCOUNTER FOR VENOUS ACCESS DEVICE CARE: Primary | ICD-10-CM

## 2020-01-01 DIAGNOSIS — C78.6 PERITONEAL CARCINOMATOSIS (HCC): ICD-10-CM

## 2020-01-01 DIAGNOSIS — T45.1X5A CHEMOTHERAPY INDUCED NAUSEA AND VOMITING: ICD-10-CM

## 2020-01-01 DIAGNOSIS — G89.3 CANCER ASSOCIATED PAIN: ICD-10-CM

## 2020-01-01 DIAGNOSIS — R13.10 DYSPHAGIA, UNSPECIFIED TYPE: ICD-10-CM

## 2020-01-01 DIAGNOSIS — K44.9 HIATAL HERNIA: Primary | ICD-10-CM

## 2020-01-01 DIAGNOSIS — C18.8 OVERLAPPING MALIGNANT NEOPLASM OF COLON (HCC): Chronic | ICD-10-CM

## 2020-01-01 DIAGNOSIS — I15.8 OTHER SECONDARY HYPERTENSION: ICD-10-CM

## 2020-01-01 LAB
ALBUMIN SERPL-MCNC: 3.4 G/DL (ref 3.6–4.8)
ALBUMIN SERPL-MCNC: 3.4 G/DL (ref 3.8–4.8)
ALBUMIN SERPL-MCNC: 3.5 G/DL (ref 3.5–5.2)
ALBUMIN SERPL-MCNC: 3.6 G/DL (ref 3.5–5.2)
ALBUMIN SERPL-MCNC: 3.6 G/DL (ref 3.8–4.8)
ALBUMIN SERPL-MCNC: 3.6 G/DL (ref 3.8–4.8)
ALBUMIN SERPL-MCNC: 3.7 G/DL (ref 3.8–4.8)
ALBUMIN SERPL-MCNC: 3.8 G/DL (ref 3.5–5.2)
ALBUMIN SERPL-MCNC: 3.8 G/DL (ref 3.6–4.8)
ALBUMIN SERPL-MCNC: 3.8 G/DL (ref 3.8–4.8)
ALBUMIN SERPL-MCNC: 3.8 G/DL (ref 3.8–4.8)
ALBUMIN SERPL-MCNC: 4.2 G/DL (ref 3.5–5.2)
ALBUMIN SERPL-MCNC: 4.2 G/DL (ref 3.5–5.2)
ALBUMIN SERPL-MCNC: 4.4 G/DL (ref 3.5–5.2)
ALBUMIN/GLOB SERPL: 1.1 G/DL
ALBUMIN/GLOB SERPL: 1.3 G/DL
ALBUMIN/GLOB SERPL: 1.3 {RATIO} (ref 1.2–2.2)
ALBUMIN/GLOB SERPL: 1.4 {RATIO} (ref 1.2–2.2)
ALBUMIN/GLOB SERPL: 1.5 G/DL
ALBUMIN/GLOB SERPL: 1.5 G/DL
ALBUMIN/GLOB SERPL: 1.5 {RATIO} (ref 1.2–2.2)
ALBUMIN/GLOB SERPL: 1.6 {RATIO} (ref 1.2–2.2)
ALBUMIN/GLOB SERPL: 1.6 {RATIO} (ref 1.2–2.2)
ALBUMIN/GLOB SERPL: 1.7 {RATIO} (ref 1.2–2.2)
ALBUMIN/GLOB SERPL: 1.8 G/DL
ALBUMIN/GLOB SERPL: 1.9 G/DL
ALP SERPL-CCNC: 106 IU/L (ref 39–117)
ALP SERPL-CCNC: 112 IU/L (ref 39–117)
ALP SERPL-CCNC: 112 IU/L (ref 39–117)
ALP SERPL-CCNC: 112 U/L (ref 39–117)
ALP SERPL-CCNC: 1123 IU/L (ref 39–117)
ALP SERPL-CCNC: 116 IU/L (ref 39–117)
ALP SERPL-CCNC: 126 IU/L (ref 39–117)
ALP SERPL-CCNC: 156 U/L (ref 39–117)
ALP SERPL-CCNC: 215 U/L (ref 39–117)
ALP SERPL-CCNC: 232 IU/L (ref 39–117)
ALP SERPL-CCNC: 236 U/L (ref 39–117)
ALP SERPL-CCNC: 249 IU/L (ref 39–117)
ALP SERPL-CCNC: 269 U/L (ref 39–117)
ALP SERPL-CCNC: 322 U/L (ref 39–117)
ALT SERPL W P-5'-P-CCNC: 22 U/L (ref 1–33)
ALT SERPL W P-5'-P-CCNC: 29 U/L (ref 1–33)
ALT SERPL-CCNC: 19 IU/L (ref 0–32)
ALT SERPL-CCNC: 20 IU/L (ref 0–32)
ALT SERPL-CCNC: 21 IU/L (ref 0–32)
ALT SERPL-CCNC: 23 IU/L (ref 0–32)
ALT SERPL-CCNC: 23 IU/L (ref 0–32)
ALT SERPL-CCNC: 25 U/L (ref 1–33)
ALT SERPL-CCNC: 304 IU/L (ref 0–32)
ALT SERPL-CCNC: 51 U/L (ref 1–33)
ALT SERPL-CCNC: 53 U/L (ref 1–33)
ALT SERPL-CCNC: 57 U/L (ref 1–33)
ALT SERPL-CCNC: 69 IU/L (ref 0–32)
ALT SERPL-CCNC: 77 IU/L (ref 0–32)
ANION GAP SERPL CALCULATED.3IONS-SCNC: 12 MMOL/L (ref 5–15)
ANION GAP SERPL CALCULATED.3IONS-SCNC: 16 MMOL/L (ref 5–15)
APPEARANCE UR: ABNORMAL
APPEARANCE UR: CLEAR
AST SERPL-CCNC: 14 IU/L (ref 0–40)
AST SERPL-CCNC: 16 IU/L (ref 0–40)
AST SERPL-CCNC: 18 U/L (ref 1–32)
AST SERPL-CCNC: 198 IU/L (ref 0–40)
AST SERPL-CCNC: 22 IU/L (ref 0–40)
AST SERPL-CCNC: 26 U/L (ref 1–32)
AST SERPL-CCNC: 32 U/L (ref 1–32)
AST SERPL-CCNC: 36 U/L (ref 1–32)
AST SERPL-CCNC: 38 U/L (ref 1–32)
AST SERPL-CCNC: 43 IU/L (ref 0–40)
AST SERPL-CCNC: 44 U/L (ref 1–32)
AST SERPL-CCNC: 48 IU/L (ref 0–40)
BACTERIA #/AREA URNS HPF: ABNORMAL /[HPF]
BACTERIA UR CULT: ABNORMAL
BASOPHILS # BLD AUTO: 0 X10E3/UL (ref 0–0.2)
BASOPHILS # BLD AUTO: 0.04 10*3/MM3 (ref 0–0.2)
BASOPHILS # BLD AUTO: 0.04 10*3/MM3 (ref 0–0.2)
BASOPHILS # BLD AUTO: 0.06 10*3/MM3 (ref 0–0.2)
BASOPHILS # BLD AUTO: 0.1 10*3/MM3 (ref 0–0.2)
BASOPHILS # BLD AUTO: 0.1 X10E3/UL (ref 0–0.2)
BASOPHILS # BLD AUTO: 0.1 X10E3/UL (ref 0–0.2)
BASOPHILS # BLD AUTO: ABNORMAL 10*3/UL
BASOPHILS NFR BLD AUTO: 0 %
BASOPHILS NFR BLD AUTO: 1 %
BASOPHILS NFR BLD AUTO: 1 % (ref 0–1.5)
BASOPHILS NFR BLD AUTO: 1.1 % (ref 0–1.5)
BASOPHILS NFR BLD AUTO: 1.7 % (ref 0–1.5)
BASOPHILS NFR BLD AUTO: 1.8 % (ref 0–1.5)
BILIRUB SERPL-MCNC: 0.2 MG/DL (ref 0.2–1.2)
BILIRUB SERPL-MCNC: 0.4 MG/DL (ref 0.2–1.2)
BILIRUB SERPL-MCNC: 4.8 MG/DL (ref 0–1.2)
BILIRUB SERPL-MCNC: <0.2 MG/DL (ref 0.2–1.2)
BILIRUB SERPL-MCNC: <0.2 MG/DL (ref 0–1.2)
BILIRUB UR QL STRIP: ABNORMAL
BILIRUB UR QL STRIP: NEGATIVE
BUN BLD-MCNC: 19 MG/DL (ref 8–23)
BUN BLD-MCNC: 9 MG/DL (ref 8–23)
BUN SERPL-MCNC: 10 MG/DL (ref 8–27)
BUN SERPL-MCNC: 10 MG/DL (ref 8–27)
BUN SERPL-MCNC: 12 MG/DL (ref 8–23)
BUN SERPL-MCNC: 12 MG/DL (ref 8–27)
BUN SERPL-MCNC: 13 MG/DL (ref 8–23)
BUN SERPL-MCNC: 13 MG/DL (ref 8–23)
BUN SERPL-MCNC: 13 MG/DL (ref 8–27)
BUN SERPL-MCNC: 15 MG/DL (ref 8–23)
BUN SERPL-MCNC: 9 MG/DL (ref 8–27)
BUN/CREAT SERPL: 13 (ref 12–28)
BUN/CREAT SERPL: 16 (ref 12–28)
BUN/CREAT SERPL: 17 (ref 12–28)
BUN/CREAT SERPL: 17.6 (ref 7–25)
BUN/CREAT SERPL: 20 (ref 12–28)
BUN/CREAT SERPL: 20 (ref 7–25)
BUN/CREAT SERPL: 20.3 (ref 7–25)
BUN/CREAT SERPL: 20.6 (ref 7–25)
BUN/CREAT SERPL: 22 (ref 12–28)
BUN/CREAT SERPL: 23 (ref 12–28)
BUN/CREAT SERPL: 24.6 (ref 7–25)
BUN/CREAT SERPL: 30.2 (ref 7–25)
CALCIUM SERPL-MCNC: 8.8 MG/DL (ref 8.7–10.3)
CALCIUM SERPL-MCNC: 8.9 MG/DL (ref 8.6–10.5)
CALCIUM SERPL-MCNC: 8.9 MG/DL (ref 8.7–10.3)
CALCIUM SERPL-MCNC: 9 MG/DL (ref 8.6–10.5)
CALCIUM SERPL-MCNC: 9 MG/DL (ref 8.7–10.3)
CALCIUM SERPL-MCNC: 9.1 MG/DL (ref 8.7–10.3)
CALCIUM SERPL-MCNC: 9.3 MG/DL (ref 8.6–10.5)
CALCIUM SERPL-MCNC: 9.7 MG/DL (ref 8.6–10.5)
CALCIUM SPEC-SCNC: 8.9 MG/DL (ref 8.6–10.5)
CALCIUM SPEC-SCNC: 9.3 MG/DL (ref 8.6–10.5)
CASTS URNS MICRO: ABNORMAL
CASTS URNS QL MICRO: PRESENT /LPF
CEA SERPL-MCNC: 9.3 NG/ML (ref 0–4.7)
CHLORIDE SERPL-SCNC: 100 MMOL/L (ref 98–107)
CHLORIDE SERPL-SCNC: 100 MMOL/L (ref 98–107)
CHLORIDE SERPL-SCNC: 101 MMOL/L (ref 98–107)
CHLORIDE SERPL-SCNC: 102 MMOL/L (ref 96–106)
CHLORIDE SERPL-SCNC: 102 MMOL/L (ref 96–106)
CHLORIDE SERPL-SCNC: 102 MMOL/L (ref 98–107)
CHLORIDE SERPL-SCNC: 103 MMOL/L (ref 96–106)
CHLORIDE SERPL-SCNC: 104 MMOL/L (ref 96–106)
CHLORIDE SERPL-SCNC: 104 MMOL/L (ref 98–107)
CHLORIDE SERPL-SCNC: 105 MMOL/L (ref 96–106)
CHLORIDE SERPL-SCNC: 93 MMOL/L (ref 96–106)
CHLORIDE SERPL-SCNC: 97 MMOL/L (ref 98–107)
CK SERPL-CCNC: 103 U/L (ref 24–173)
CK SERPL-CCNC: 119 U/L (ref 24–173)
CLARITY UR: CLEAR
CO2 SERPL-SCNC: 21 MMOL/L (ref 20–29)
CO2 SERPL-SCNC: 22 MMOL/L (ref 20–29)
CO2 SERPL-SCNC: 23 MMOL/L (ref 20–29)
CO2 SERPL-SCNC: 23 MMOL/L (ref 20–29)
CO2 SERPL-SCNC: 24 MMOL/L (ref 20–29)
CO2 SERPL-SCNC: 25 MMOL/L (ref 20–29)
CO2 SERPL-SCNC: 25.4 MMOL/L (ref 22–29)
CO2 SERPL-SCNC: 25.6 MMOL/L (ref 22–29)
CO2 SERPL-SCNC: 25.7 MMOL/L (ref 22–29)
CO2 SERPL-SCNC: 26 MMOL/L (ref 22–29)
CO2 SERPL-SCNC: 26.7 MMOL/L (ref 22–29)
CO2 SERPL-SCNC: 27 MMOL/L (ref 22–29)
COLOR UR: NORMAL
COLOR UR: YELLOW
CREAT BLD-MCNC: 0.51 MG/DL (ref 0.57–1)
CREAT BLD-MCNC: 0.63 MG/DL (ref 0.57–1)
CREAT SERPL-MCNC: 0.49 MG/DL (ref 0.57–1)
CREAT SERPL-MCNC: 0.53 MG/DL (ref 0.57–1)
CREAT SERPL-MCNC: 0.54 MG/DL (ref 0.57–1)
CREAT SERPL-MCNC: 0.59 MG/DL (ref 0.57–1)
CREAT SERPL-MCNC: 0.6 MG/DL (ref 0.57–1)
CREAT SERPL-MCNC: 0.61 MG/DL (ref 0.57–1)
CREAT SERPL-MCNC: 0.62 MG/DL (ref 0.57–1)
CREAT SERPL-MCNC: 0.63 MG/DL (ref 0.57–1)
CREAT SERPL-MCNC: 0.64 MG/DL (ref 0.57–1)
CREAT SERPL-MCNC: 0.65 MG/DL (ref 0.57–1)
CREAT SERPL-MCNC: 0.68 MG/DL (ref 0.57–1)
CREAT SERPL-MCNC: 0.7 MG/DL (ref 0.57–1)
CRYSTALS URNS MICRO: ABNORMAL
DEPRECATED RDW RBC AUTO: 65.5 FL (ref 37–54)
DIFFERENTIAL COMMENT: ABNORMAL
EOSINOPHIL # BLD AUTO: 0.1 X10E3/UL (ref 0–0.4)
EOSINOPHIL # BLD AUTO: 0.14 10*3/MM3 (ref 0–0.4)
EOSINOPHIL # BLD AUTO: 0.14 10*3/MM3 (ref 0–0.4)
EOSINOPHIL # BLD AUTO: 0.23 10*3/MM3 (ref 0–0.4)
EOSINOPHIL # BLD AUTO: 0.3 X10E3/UL (ref 0–0.4)
EOSINOPHIL # BLD AUTO: 0.36 10*3/MM3 (ref 0–0.4)
EOSINOPHIL # BLD AUTO: 0.4 X10E3/UL (ref 0–0.4)
EOSINOPHIL # BLD AUTO: 0.4 X10E3/UL (ref 0–0.4)
EOSINOPHIL # BLD AUTO: 0.5 X10E3/UL (ref 0–0.4)
EOSINOPHIL # BLD AUTO: 0.7 X10E3/UL (ref 0–0.4)
EOSINOPHIL # BLD AUTO: ABNORMAL 10*3/UL
EOSINOPHIL # BLD MANUAL: 0.26 10*3/MM3 (ref 0–0.4)
EOSINOPHIL NFR BLD AUTO: 12 %
EOSINOPHIL NFR BLD AUTO: 2.5 % (ref 0.3–6.2)
EOSINOPHIL NFR BLD AUTO: 4 %
EOSINOPHIL NFR BLD AUTO: 4 %
EOSINOPHIL NFR BLD AUTO: 5 %
EOSINOPHIL NFR BLD AUTO: 5.7 % (ref 0.3–6.2)
EOSINOPHIL NFR BLD AUTO: 6 %
EOSINOPHIL NFR BLD AUTO: 6.1 % (ref 0.3–6.2)
EOSINOPHIL NFR BLD AUTO: 6.4 % (ref 0.3–6.2)
EOSINOPHIL NFR BLD AUTO: 7 %
EOSINOPHIL NFR BLD AUTO: 8 %
EOSINOPHIL NFR BLD AUTO: 9 %
EOSINOPHIL NFR BLD AUTO: ABNORMAL %
EOSINOPHIL NFR BLD MANUAL: 6.1 % (ref 0.3–6.2)
EPI CELLS #/AREA URNS HPF: ABNORMAL /HPF (ref 0–10)
ERYTHROCYTE [DISTWIDTH] IN BLOOD BY AUTOMATED COUNT: 13.2 % (ref 12.3–15.4)
ERYTHROCYTE [DISTWIDTH] IN BLOOD BY AUTOMATED COUNT: 14.1 % (ref 11.7–15.4)
ERYTHROCYTE [DISTWIDTH] IN BLOOD BY AUTOMATED COUNT: 14.1 % (ref 12.3–15.4)
ERYTHROCYTE [DISTWIDTH] IN BLOOD BY AUTOMATED COUNT: 14.4 % (ref 11.7–15.4)
ERYTHROCYTE [DISTWIDTH] IN BLOOD BY AUTOMATED COUNT: 14.5 % (ref 11.7–15.4)
ERYTHROCYTE [DISTWIDTH] IN BLOOD BY AUTOMATED COUNT: 14.7 % (ref 11.7–15.4)
ERYTHROCYTE [DISTWIDTH] IN BLOOD BY AUTOMATED COUNT: 15.2 % (ref 11.7–15.4)
ERYTHROCYTE [DISTWIDTH] IN BLOOD BY AUTOMATED COUNT: 15.2 % (ref 11.7–15.4)
ERYTHROCYTE [DISTWIDTH] IN BLOOD BY AUTOMATED COUNT: 15.4 % (ref 12.3–15.4)
ERYTHROCYTE [DISTWIDTH] IN BLOOD BY AUTOMATED COUNT: 17.1 % (ref 12.3–15.4)
ERYTHROCYTE [DISTWIDTH] IN BLOOD BY AUTOMATED COUNT: 19.1 % (ref 12.3–15.4)
ERYTHROCYTE [DISTWIDTH] IN BLOOD BY AUTOMATED COUNT: 21 % (ref 12.3–15.4)
ERYTHROCYTE [DISTWIDTH] IN BLOOD BY AUTOMATED COUNT: 21.8 % (ref 11.7–15.4)
ERYTHROCYTE [DISTWIDTH] IN BLOOD BY AUTOMATED COUNT: 22.3 % (ref 12.3–15.4)
GFR SERPL CREATININE-BSD FRML MDRD: 121 ML/MIN/1.73
GFR SERPL CREATININE-BSD FRML MDRD: 95 ML/MIN/1.73
GLOBULIN SER CALC-MCNC: 2.2 G/DL (ref 1.5–4.5)
GLOBULIN SER CALC-MCNC: 2.2 G/DL (ref 1.5–4.5)
GLOBULIN SER CALC-MCNC: 2.2 GM/DL
GLOBULIN SER CALC-MCNC: 2.3 G/DL (ref 1.5–4.5)
GLOBULIN SER CALC-MCNC: 2.3 G/DL (ref 1.5–4.5)
GLOBULIN SER CALC-MCNC: 2.3 GM/DL
GLOBULIN SER CALC-MCNC: 2.4 GM/DL
GLOBULIN SER CALC-MCNC: 2.5 G/DL (ref 1.5–4.5)
GLOBULIN SER CALC-MCNC: 2.6 G/DL (ref 1.5–4.5)
GLOBULIN SER CALC-MCNC: 2.6 G/DL (ref 1.5–4.5)
GLOBULIN SER CALC-MCNC: 2.6 GM/DL
GLOBULIN SER CALC-MCNC: 2.7 G/DL (ref 1.5–4.5)
GLOBULIN UR ELPH-MCNC: 3.4 GM/DL
GLOBULIN UR ELPH-MCNC: 3.5 GM/DL
GLUCOSE BLD-MCNC: 100 MG/DL (ref 65–99)
GLUCOSE BLD-MCNC: 81 MG/DL (ref 65–99)
GLUCOSE SERPL-MCNC: 100 MG/DL (ref 65–99)
GLUCOSE SERPL-MCNC: 103 MG/DL (ref 65–99)
GLUCOSE SERPL-MCNC: 117 MG/DL (ref 65–99)
GLUCOSE SERPL-MCNC: 133 MG/DL (ref 65–99)
GLUCOSE SERPL-MCNC: 137 MG/DL (ref 65–99)
GLUCOSE SERPL-MCNC: 84 MG/DL (ref 65–99)
GLUCOSE SERPL-MCNC: 88 MG/DL (ref 65–99)
GLUCOSE SERPL-MCNC: 89 MG/DL (ref 65–99)
GLUCOSE SERPL-MCNC: 89 MG/DL (ref 65–99)
GLUCOSE SERPL-MCNC: 90 MG/DL (ref 65–99)
GLUCOSE SERPL-MCNC: 90 MG/DL (ref 65–99)
GLUCOSE SERPL-MCNC: 99 MG/DL (ref 65–99)
GLUCOSE UR QL: NEGATIVE
GLUCOSE UR STRIP-MCNC: NEGATIVE MG/DL
HCT VFR BLD AUTO: 28.5 % (ref 34–46.6)
HCT VFR BLD AUTO: 28.8 % (ref 34–46.6)
HCT VFR BLD AUTO: 29.3 % (ref 34–46.6)
HCT VFR BLD AUTO: 30.5 % (ref 34–46.6)
HCT VFR BLD AUTO: 30.5 % (ref 34–46.6)
HCT VFR BLD AUTO: 31.4 % (ref 34–46.6)
HCT VFR BLD AUTO: 31.7 % (ref 34–46.6)
HCT VFR BLD AUTO: 32.3 % (ref 34–46.6)
HCT VFR BLD AUTO: 32.7 % (ref 34–46.6)
HCT VFR BLD AUTO: 33 % (ref 34–46.6)
HCT VFR BLD AUTO: 33.4 % (ref 34–46.6)
HCT VFR BLD AUTO: 34.7 % (ref 34–46.6)
HCT VFR BLD AUTO: 35.8 % (ref 34–46.6)
HCT VFR BLD AUTO: 38.4 % (ref 34–46.6)
HGB BLD-MCNC: 10 G/DL (ref 11.1–15.9)
HGB BLD-MCNC: 10.2 G/DL (ref 11.1–15.9)
HGB BLD-MCNC: 10.3 G/DL (ref 12–15.9)
HGB BLD-MCNC: 10.4 G/DL (ref 12–15.9)
HGB BLD-MCNC: 10.5 G/DL (ref 11.1–15.9)
HGB BLD-MCNC: 10.9 G/DL (ref 11.1–15.9)
HGB BLD-MCNC: 10.9 G/DL (ref 11.1–15.9)
HGB BLD-MCNC: 11.5 G/DL (ref 11.1–15.9)
HGB BLD-MCNC: 11.8 G/DL (ref 12–15.9)
HGB BLD-MCNC: 8.9 G/DL (ref 11.1–15.9)
HGB BLD-MCNC: 9 G/DL (ref 11.1–15.9)
HGB BLD-MCNC: 9.1 G/DL (ref 12–15.9)
HGB BLD-MCNC: 9.5 G/DL (ref 12–15.9)
HGB BLD-MCNC: 9.7 G/DL (ref 12–15.9)
HGB UR QL STRIP.AUTO: ABNORMAL
HGB UR QL STRIP: NEGATIVE
IMM GRANULOCYTES # BLD AUTO: 0 10*3/MM3 (ref 0–0.05)
IMM GRANULOCYTES # BLD AUTO: 0 10*3/MM3 (ref 0–0.05)
IMM GRANULOCYTES # BLD AUTO: 0 X10E3/UL (ref 0–0.1)
IMM GRANULOCYTES # BLD AUTO: 0.01 10*3/MM3 (ref 0–0.05)
IMM GRANULOCYTES # BLD AUTO: 0.02 10*3/MM3 (ref 0–0.05)
IMM GRANULOCYTES NFR BLD AUTO: 0 %
IMM GRANULOCYTES NFR BLD AUTO: 0 % (ref 0–0.5)
IMM GRANULOCYTES NFR BLD AUTO: 0 % (ref 0–0.5)
IMM GRANULOCYTES NFR BLD AUTO: 0.2 % (ref 0–0.5)
IMM GRANULOCYTES NFR BLD AUTO: 0.2 % (ref 0–0.5)
KETONES UR QL STRIP: ABNORMAL
KETONES UR QL STRIP: ABNORMAL
KETONES UR QL STRIP: NEGATIVE
LEUKOCYTE ESTERASE UR QL STRIP.AUTO: ABNORMAL
LEUKOCYTE ESTERASE UR QL STRIP: ABNORMAL
LIPASE SERPL-CCNC: 24 U/L (ref 13–60)
LYMPHOCYTES # BLD AUTO: 0.6 X10E3/UL (ref 0.7–3.1)
LYMPHOCYTES # BLD AUTO: 0.63 10*3/MM3 (ref 0.7–3.1)
LYMPHOCYTES # BLD AUTO: 0.68 10*3/MM3 (ref 0.7–3.1)
LYMPHOCYTES # BLD AUTO: 0.7 X10E3/UL (ref 0.7–3.1)
LYMPHOCYTES # BLD AUTO: 0.86 10*3/MM3 (ref 0.7–3.1)
LYMPHOCYTES # BLD AUTO: 0.9 X10E3/UL (ref 0.7–3.1)
LYMPHOCYTES # BLD AUTO: 1 X10E3/UL (ref 0.7–3.1)
LYMPHOCYTES # BLD AUTO: 1.09 10*3/MM3 (ref 0.7–3.1)
LYMPHOCYTES # BLD AUTO: 1.3 10*3/MM3 (ref 0.7–3.1)
LYMPHOCYTES # BLD AUTO: ABNORMAL 10*3/UL
LYMPHOCYTES # BLD MANUAL: 0.39 10*3/MM3 (ref 0.7–3.1)
LYMPHOCYTES NFR BLD AUTO: 10 %
LYMPHOCYTES NFR BLD AUTO: 12 %
LYMPHOCYTES NFR BLD AUTO: 12 %
LYMPHOCYTES NFR BLD AUTO: 12 % (ref 19.6–45.3)
LYMPHOCYTES NFR BLD AUTO: 13 %
LYMPHOCYTES NFR BLD AUTO: 13.6 % (ref 19.6–45.3)
LYMPHOCYTES NFR BLD AUTO: 16.1 % (ref 19.6–45.3)
LYMPHOCYTES NFR BLD AUTO: 17 %
LYMPHOCYTES NFR BLD AUTO: 28 %
LYMPHOCYTES NFR BLD AUTO: 28.8 % (ref 19.6–45.3)
LYMPHOCYTES NFR BLD AUTO: 29.7 % (ref 19.6–45.3)
LYMPHOCYTES NFR BLD AUTO: 9 %
LYMPHOCYTES NFR BLD AUTO: 9 %
LYMPHOCYTES NFR BLD AUTO: ABNORMAL %
LYMPHOCYTES NFR BLD MANUAL: 9.2 % (ref 19.6–45.3)
MAGNESIUM SERPL-MCNC: 1.8 MG/DL (ref 1.6–2.3)
MAGNESIUM SERPL-MCNC: 1.9 MG/DL (ref 1.6–2.3)
MAGNESIUM SERPL-MCNC: 1.9 MG/DL (ref 1.6–2.3)
MAGNESIUM SERPL-MCNC: 1.9 MG/DL (ref 1.6–2.4)
MAGNESIUM SERPL-MCNC: 2 MG/DL (ref 1.6–2.3)
MAGNESIUM SERPL-MCNC: 2 MG/DL (ref 1.6–2.3)
MCH RBC QN AUTO: 25.6 PG (ref 26.6–33)
MCH RBC QN AUTO: 25.7 PG (ref 26.6–33)
MCH RBC QN AUTO: 25.9 PG (ref 26.6–33)
MCH RBC QN AUTO: 26 PG (ref 26.6–33)
MCH RBC QN AUTO: 26 PG (ref 26.6–33)
MCH RBC QN AUTO: 26.1 PG (ref 26.6–33)
MCH RBC QN AUTO: 26.6 PG (ref 26.6–33)
MCH RBC QN AUTO: 27.2 PG (ref 26.6–33)
MCH RBC QN AUTO: 27.5 PG (ref 26.6–33)
MCH RBC QN AUTO: 27.9 PG (ref 26.6–33)
MCH RBC QN AUTO: 29.3 PG (ref 26.6–33)
MCH RBC QN AUTO: 29.5 PG (ref 26.6–33)
MCH RBC QN AUTO: 30.5 PG (ref 26.6–33)
MCH RBC QN AUTO: 31.1 PG (ref 26.6–33)
MCHC RBC AUTO-ENTMCNC: 30.7 G/DL (ref 31.5–35.7)
MCHC RBC AUTO-ENTMCNC: 30.9 G/DL (ref 31.5–35.7)
MCHC RBC AUTO-ENTMCNC: 31.1 G/DL (ref 31.5–35.7)
MCHC RBC AUTO-ENTMCNC: 31.1 G/DL (ref 31.5–35.7)
MCHC RBC AUTO-ENTMCNC: 31.4 G/DL (ref 31.5–35.7)
MCHC RBC AUTO-ENTMCNC: 31.4 G/DL (ref 31.5–35.7)
MCHC RBC AUTO-ENTMCNC: 31.6 G/DL (ref 31.5–35.7)
MCHC RBC AUTO-ENTMCNC: 31.7 G/DL (ref 31.5–35.7)
MCHC RBC AUTO-ENTMCNC: 31.8 G/DL (ref 31.5–35.7)
MCHC RBC AUTO-ENTMCNC: 31.8 G/DL (ref 31.5–35.7)
MCHC RBC AUTO-ENTMCNC: 31.9 G/DL (ref 31.5–35.7)
MCHC RBC AUTO-ENTMCNC: 32.1 G/DL (ref 31.5–35.7)
MCHC RBC AUTO-ENTMCNC: 32.2 G/DL (ref 31.5–35.7)
MCHC RBC AUTO-ENTMCNC: 33 G/DL (ref 31.5–35.7)
MCV RBC AUTO: 82 FL (ref 79–97)
MCV RBC AUTO: 82 FL (ref 79–97)
MCV RBC AUTO: 83 FL (ref 79–97)
MCV RBC AUTO: 83.5 FL (ref 79–97)
MCV RBC AUTO: 83.6 FL (ref 79–97)
MCV RBC AUTO: 83.7 FL (ref 79–97)
MCV RBC AUTO: 85 FL (ref 79–97)
MCV RBC AUTO: 85.8 FL (ref 79–97)
MCV RBC AUTO: 87 FL (ref 79–97)
MCV RBC AUTO: 89 FL (ref 79–97)
MCV RBC AUTO: 91 FL (ref 79–97)
MCV RBC AUTO: 92 FL (ref 79–97)
MCV RBC AUTO: 92.6 FL (ref 79–97)
MCV RBC AUTO: 97 FL (ref 79–97)
MICRO URNS: NORMAL
MICRO URNS: NORMAL
MONOCYTES # BLD AUTO: 0.3 X10E3/UL (ref 0.1–0.9)
MONOCYTES # BLD AUTO: 0.32 10*3/MM3 (ref 0.1–0.9)
MONOCYTES # BLD AUTO: 0.32 10*3/MM3 (ref 0.1–0.9)
MONOCYTES # BLD AUTO: 0.4 X10E3/UL (ref 0.1–0.9)
MONOCYTES # BLD AUTO: 0.5 X10E3/UL (ref 0.1–0.9)
MONOCYTES # BLD AUTO: 0.5 X10E3/UL (ref 0.1–0.9)
MONOCYTES # BLD AUTO: 0.59 10*3/MM3 (ref 0.1–0.9)
MONOCYTES # BLD AUTO: 0.6 10*3/MM3 (ref 0.1–0.9)
MONOCYTES # BLD AUTO: 0.6 X10E3/UL (ref 0.1–0.9)
MONOCYTES # BLD AUTO: 0.6 X10E3/UL (ref 0.1–0.9)
MONOCYTES # BLD AUTO: 0.7 X10E3/UL (ref 0.1–0.9)
MONOCYTES # BLD AUTO: 0.8 X10E3/UL (ref 0.1–0.9)
MONOCYTES # BLD AUTO: 0.92 10*3/MM3 (ref 0.1–0.9)
MONOCYTES # BLD MANUAL: 0.47 10*3/MM3 (ref 0.1–0.9)
MONOCYTES NFR BLD AUTO: 10 %
MONOCYTES NFR BLD AUTO: 10 %
MONOCYTES NFR BLD AUTO: 10.1 % (ref 5–12)
MONOCYTES NFR BLD AUTO: 11 %
MONOCYTES NFR BLD AUTO: 14 % (ref 5–12)
MONOCYTES NFR BLD AUTO: 14.6 % (ref 5–12)
MONOCYTES NFR BLD AUTO: 16 %
MONOCYTES NFR BLD AUTO: 6 %
MONOCYTES NFR BLD AUTO: 7.9 % (ref 5–12)
MONOCYTES NFR BLD AUTO: 8 %
MONOCYTES NFR BLD AUTO: 9 %
MONOCYTES NFR BLD AUTO: 9 %
MONOCYTES NFR BLD AUTO: 9.4 % (ref 5–12)
MONOCYTES NFR BLD AUTO: ABNORMAL %
MONOCYTES NFR BLD MANUAL: 11.2 % (ref 5–12)
MUCOUS THREADS URNS QL MICRO: PRESENT
NEUTROPHILS # BLD AUTO: 1.06 10*3/MM3 (ref 1.7–7)
NEUTROPHILS # BLD AUTO: 1.1 X10E3/UL (ref 1.4–7)
NEUTROPHILS # BLD AUTO: 1.11 10*3/MM3 (ref 1.7–7)
NEUTROPHILS # BLD AUTO: 2.4 X10E3/UL (ref 1.4–7)
NEUTROPHILS # BLD AUTO: 4 X10E3/UL (ref 1.4–7)
NEUTROPHILS # BLD AUTO: 4.3 X10E3/UL (ref 1.4–7)
NEUTROPHILS # BLD AUTO: 4.43 10*3/MM3 (ref 1.7–7)
NEUTROPHILS # BLD AUTO: 5 X10E3/UL (ref 1.4–7)
NEUTROPHILS # BLD AUTO: 5 X10E3/UL (ref 1.4–7)
NEUTROPHILS # BLD AUTO: 5.1 X10E3/UL (ref 1.4–7)
NEUTROPHILS # BLD AUTO: 6 10*3/MM3 (ref 1.7–7)
NEUTROPHILS # BLD AUTO: 6.5 X10E3/UL (ref 1.4–7)
NEUTROPHILS # BLD AUTO: 6.72 10*3/MM3 (ref 1.7–7)
NEUTROPHILS # BLD MANUAL: 3.11 10*3/MM3 (ref 1.7–7)
NEUTROPHILS NFR BLD AUTO: 48.4 % (ref 42.7–76)
NEUTROPHILS NFR BLD AUTO: 48.5 % (ref 42.7–76)
NEUTROPHILS NFR BLD AUTO: 49 %
NEUTROPHILS NFR BLD AUTO: 62 %
NEUTROPHILS NFR BLD AUTO: 70 %
NEUTROPHILS NFR BLD AUTO: 70 %
NEUTROPHILS NFR BLD AUTO: 70.1 % (ref 42.7–76)
NEUTROPHILS NFR BLD AUTO: 72 %
NEUTROPHILS NFR BLD AUTO: 73 %
NEUTROPHILS NFR BLD AUTO: 74.1 % (ref 42.7–76)
NEUTROPHILS NFR BLD AUTO: 76 % (ref 42.7–76)
NEUTROPHILS NFR BLD AUTO: 77 %
NEUTROPHILS NFR BLD AUTO: 78 %
NEUTROPHILS NFR BLD AUTO: ABNORMAL %
NEUTROPHILS NFR BLD MANUAL: 73.5 % (ref 42.7–76)
NITRITE UR QL STRIP: NEGATIVE
NRBC BLD AUTO-RTO: 0 /100 WBC (ref 0–0.2)
PH UR STRIP.AUTO: 6 [PH] (ref 5–8)
PH UR STRIP: 5.5 [PH] (ref 5–7.5)
PH UR STRIP: 5.5 [PH] (ref 5–7.5)
PH UR STRIP: 5.5 [PH] (ref 5–8)
PH UR STRIP: 5.5 [PH] (ref 5–8)
PLAT MORPH BLD: NORMAL
PLATELET # BLD AUTO: 352 10*3/MM3 (ref 140–450)
PLATELET # BLD AUTO: 353 10*3/MM3 (ref 140–450)
PLATELET # BLD AUTO: 359 10*3/MM3 (ref 140–450)
PLATELET # BLD AUTO: 389 X10E3/UL (ref 150–450)
PLATELET # BLD AUTO: 391 10*3/MM3 (ref 140–450)
PLATELET # BLD AUTO: 397 X10E3/UL (ref 150–450)
PLATELET # BLD AUTO: 428 X10E3/UL (ref 150–450)
PLATELET # BLD AUTO: 430 10*3/MM3 (ref 140–450)
PLATELET # BLD AUTO: 451 X10E3/UL (ref 150–450)
PLATELET # BLD AUTO: 531 X10E3/UL (ref 150–450)
PLATELET # BLD AUTO: 533 X10E3/UL (ref 150–450)
PLATELET # BLD AUTO: 554 X10E3/UL (ref 150–450)
PLATELET # BLD AUTO: 567 X10E3/UL (ref 150–450)
PLATELET # BLD AUTO: 633 10*3/MM3 (ref 140–450)
PLATELET BLD QL SMEAR: ABNORMAL
PMV BLD AUTO: 6.1 FL (ref 6–12)
PMV BLD AUTO: 8.7 FL (ref 6–12)
POTASSIUM BLD-SCNC: 3.2 MMOL/L (ref 3.5–5.2)
POTASSIUM BLD-SCNC: 3.5 MMOL/L (ref 3.5–5.2)
POTASSIUM SERPL-SCNC: 3.4 MMOL/L (ref 3.5–5.2)
POTASSIUM SERPL-SCNC: 3.6 MMOL/L (ref 3.5–5.2)
POTASSIUM SERPL-SCNC: 3.8 MMOL/L (ref 3.5–5.2)
POTASSIUM SERPL-SCNC: 3.9 MMOL/L (ref 3.5–5.2)
POTASSIUM SERPL-SCNC: 3.9 MMOL/L (ref 3.5–5.2)
POTASSIUM SERPL-SCNC: 4 MMOL/L (ref 3.5–5.2)
POTASSIUM SERPL-SCNC: 4.1 MMOL/L (ref 3.5–5.2)
POTASSIUM SERPL-SCNC: 4.1 MMOL/L (ref 3.5–5.2)
POTASSIUM SERPL-SCNC: 4.2 MMOL/L (ref 3.5–5.2)
POTASSIUM SERPL-SCNC: 4.2 MMOL/L (ref 3.5–5.2)
PROT SERPL-MCNC: 5.6 G/DL (ref 6–8.5)
PROT SERPL-MCNC: 5.8 G/DL (ref 6–8.5)
PROT SERPL-MCNC: 5.8 G/DL (ref 6–8.5)
PROT SERPL-MCNC: 6 G/DL (ref 6–8.5)
PROT SERPL-MCNC: 6.1 G/DL (ref 6–8.5)
PROT SERPL-MCNC: 6.1 G/DL (ref 6–8.5)
PROT SERPL-MCNC: 6.2 G/DL (ref 6–8.5)
PROT SERPL-MCNC: 6.3 G/DL (ref 6–8.5)
PROT SERPL-MCNC: 6.4 G/DL (ref 6–8.5)
PROT SERPL-MCNC: 6.6 G/DL (ref 6–8.5)
PROT SERPL-MCNC: 7 G/DL (ref 6–8.5)
PROT SERPL-MCNC: 7.9 G/DL (ref 6–8.5)
PROT UR QL STRIP: ABNORMAL
RBC # BLD AUTO: 3.46 X10E6/UL (ref 3.77–5.28)
RBC # BLD AUTO: 3.48 X10E6/UL (ref 3.77–5.28)
RBC # BLD AUTO: 3.48 X10E6/UL (ref 3.77–5.28)
RBC # BLD AUTO: 3.49 10*6/MM3 (ref 3.77–5.28)
RBC # BLD AUTO: 3.51 10*6/MM3 (ref 3.77–5.28)
RBC # BLD AUTO: 3.55 10*6/MM3 (ref 3.77–5.28)
RBC # BLD AUTO: 3.57 X10E6/UL (ref 3.77–5.28)
RBC # BLD AUTO: 3.63 X10E6/UL (ref 3.77–5.28)
RBC # BLD AUTO: 3.65 10*6/MM3 (ref 3.77–5.28)
RBC # BLD AUTO: 3.7 X10E6/UL (ref 3.77–5.28)
RBC # BLD AUTO: 3.9 X10E6/UL (ref 3.77–5.28)
RBC # BLD AUTO: 3.94 X10E6/UL (ref 3.77–5.28)
RBC # BLD AUTO: 3.99 10*6/MM3 (ref 3.77–5.28)
RBC # BLD AUTO: 4.59 10*6/MM3 (ref 3.77–5.28)
RBC #/AREA URNS HPF: ABNORMAL /HPF (ref 0–2)
RBC MORPH BLD: ABNORMAL
RBC MORPH BLD: NORMAL
SODIUM BLD-SCNC: 136 MMOL/L (ref 136–145)
SODIUM BLD-SCNC: 143 MMOL/L (ref 136–145)
SODIUM SERPL-SCNC: 136 MMOL/L (ref 134–144)
SODIUM SERPL-SCNC: 139 MMOL/L (ref 136–145)
SODIUM SERPL-SCNC: 139 MMOL/L (ref 136–145)
SODIUM SERPL-SCNC: 141 MMOL/L (ref 136–145)
SODIUM SERPL-SCNC: 142 MMOL/L (ref 134–144)
SODIUM SERPL-SCNC: 142 MMOL/L (ref 136–145)
SODIUM SERPL-SCNC: 143 MMOL/L (ref 134–144)
SODIUM SERPL-SCNC: 143 MMOL/L (ref 134–144)
SODIUM SERPL-SCNC: 144 MMOL/L (ref 134–144)
SP GR UR STRIP: 1.01 (ref 1–1.03)
SP GR UR: 1.02 (ref 1–1.03)
SPECIMEN STATUS: NORMAL
UNIDENT CRYS URNS QL MICRO: PRESENT
URINALYSIS REFLEX: NORMAL
UROBILINOGEN UR QL STRIP: ABNORMAL
UROBILINOGEN UR STRIP-MCNC: 0.2 MG/DL (ref 0.2–1)
UROBILINOGEN UR STRIP-MCNC: 0.2 MG/DL (ref 0.2–1)
UROBILINOGEN UR STRIP-MCNC: ABNORMAL MG/DL
UROBILINOGEN UR STRIP-MCNC: ABNORMAL MG/DL
WBC # BLD AUTO: 2.19 10*3/MM3 (ref 3.4–10.8)
WBC # BLD AUTO: 2.29 10*3/MM3 (ref 3.4–10.8)
WBC # BLD AUTO: 2.3 X10E3/UL (ref 3.4–10.8)
WBC # BLD AUTO: 3.9 X10E3/UL (ref 3.4–10.8)
WBC # BLD AUTO: 4.23 10*3/MM3 (ref 3.4–10.8)
WBC # BLD AUTO: 5.7 X10E3/UL (ref 3.4–10.8)
WBC # BLD AUTO: 5.9 X10E3/UL (ref 3.4–10.8)
WBC # BLD AUTO: 6.31 10*3/MM3 (ref 3.4–10.8)
WBC # BLD AUTO: 6.6 X10E3/UL (ref 3.4–10.8)
WBC # BLD AUTO: 7 X10E3/UL (ref 3.4–10.8)
WBC # BLD AUTO: 7.3 X10E3/UL (ref 3.4–10.8)
WBC # BLD AUTO: 8.3 X10E3/UL (ref 3.4–10.8)
WBC #/AREA URNS HPF: ABNORMAL /HPF (ref 0–5)
WBC MORPH BLD: NORMAL
WBC NRBC COR # BLD: 7.9 10*3/MM3 (ref 3.4–10.8)
WBC NRBC COR # BLD: 9.08 10*3/MM3 (ref 3.4–10.8)
WRITTEN AUTHORIZATION: NORMAL

## 2020-01-01 PROCEDURE — 96416 CHEMO PROLONG INFUSE W/PUMP: CPT

## 2020-01-01 PROCEDURE — 36591 DRAW BLOOD OFF VENOUS DEVICE: CPT

## 2020-01-01 PROCEDURE — 25010000002 DEXAMETHASONE PER 1 MG: Performed by: NURSE PRACTITIONER

## 2020-01-01 PROCEDURE — 25010000002 DIPHENHYDRAMINE PER 50 MG: Performed by: NURSE PRACTITIONER

## 2020-01-01 PROCEDURE — 99214 OFFICE O/P EST MOD 30 MIN: CPT | Performed by: INTERNAL MEDICINE

## 2020-01-01 PROCEDURE — 25010000002 BEVACIZUMAB-AWWB 100 MG/4ML SOLUTION 4 ML VIAL: Performed by: INTERNAL MEDICINE

## 2020-01-01 PROCEDURE — 25010000002 METHYLPREDNISOLONE PER 125 MG: Performed by: NURSE PRACTITIONER

## 2020-01-01 PROCEDURE — 25010000002 ATROPINE PER 0.01 MG: Performed by: NURSE PRACTITIONER

## 2020-01-01 PROCEDURE — 85007 BL SMEAR W/DIFF WBC COUNT: CPT | Performed by: EMERGENCY MEDICINE

## 2020-01-01 PROCEDURE — 25010000002 LEUCOVORIN 500 MG RECONSTITUTED SOLUTION 1 EACH VIAL: Performed by: NURSE PRACTITIONER

## 2020-01-01 PROCEDURE — 25010000003 HEPARIN LOCK FLUCH PER 10 UNITS: Performed by: INTERNAL MEDICINE

## 2020-01-01 PROCEDURE — 96375 TX/PRO/DX INJ NEW DRUG ADDON: CPT

## 2020-01-01 PROCEDURE — 96367 TX/PROPH/DG ADDL SEQ IV INF: CPT

## 2020-01-01 PROCEDURE — 25010000002 IRINOTECAN PER 20 MG: Performed by: INTERNAL MEDICINE

## 2020-01-01 PROCEDURE — 25010000002 ATROPINE PER 0.01 MG: Performed by: INTERNAL MEDICINE

## 2020-01-01 PROCEDURE — 99215 OFFICE O/P EST HI 40 MIN: CPT | Performed by: INTERNAL MEDICINE

## 2020-01-01 PROCEDURE — 25010000002 CETUXIMAB PER 10 MG: Performed by: NURSE PRACTITIONER

## 2020-01-01 PROCEDURE — 96368 THER/DIAG CONCURRENT INF: CPT

## 2020-01-01 PROCEDURE — 83690 ASSAY OF LIPASE: CPT | Performed by: EMERGENCY MEDICINE

## 2020-01-01 PROCEDURE — 96376 TX/PRO/DX INJ SAME DRUG ADON: CPT

## 2020-01-01 PROCEDURE — 96415 CHEMO IV INFUSION ADDL HR: CPT

## 2020-01-01 PROCEDURE — 96417 CHEMO IV INFUS EACH ADDL SEQ: CPT

## 2020-01-01 PROCEDURE — 74176 CT ABD & PELVIS W/O CONTRAST: CPT

## 2020-01-01 PROCEDURE — 96413 CHEMO IV INFUSION 1 HR: CPT

## 2020-01-01 PROCEDURE — 99214 OFFICE O/P EST MOD 30 MIN: CPT | Performed by: NURSE PRACTITIONER

## 2020-01-01 PROCEDURE — 96411 CHEMO IV PUSH ADDL DRUG: CPT

## 2020-01-01 PROCEDURE — 25010000002 LEUCOVORIN 200 MG RECONSTITUTED SOLUTION 200 MG VIAL: Performed by: INTERNAL MEDICINE

## 2020-01-01 PROCEDURE — 85025 COMPLETE CBC W/AUTO DIFF WBC: CPT | Performed by: INTERNAL MEDICINE

## 2020-01-01 PROCEDURE — 99284 EMERGENCY DEPT VISIT MOD MDM: CPT

## 2020-01-01 PROCEDURE — 25010000002 IRINOTECAN PER 20 MG: Performed by: NURSE PRACTITIONER

## 2020-01-01 PROCEDURE — 25010000002 FLUOROURACIL PER 500 MG: Performed by: NURSE PRACTITIONER

## 2020-01-01 PROCEDURE — 25010000002 FLUOROURACIL PER 500 MG: Performed by: INTERNAL MEDICINE

## 2020-01-01 PROCEDURE — 80053 COMPREHEN METABOLIC PANEL: CPT | Performed by: EMERGENCY MEDICINE

## 2020-01-01 PROCEDURE — 81003 URINALYSIS AUTO W/O SCOPE: CPT | Performed by: INTERNAL MEDICINE

## 2020-01-01 PROCEDURE — 96366 THER/PROPH/DIAG IV INF ADDON: CPT

## 2020-01-01 PROCEDURE — 25010000002 LEUCOVORIN CALCIUM PER 50MG: Performed by: INTERNAL MEDICINE

## 2020-01-01 PROCEDURE — 71045 X-RAY EXAM CHEST 1 VIEW: CPT

## 2020-01-01 PROCEDURE — 25010000002 DEXAMETHASONE PER 1 MG: Performed by: INTERNAL MEDICINE

## 2020-01-01 PROCEDURE — 25010000002 PALONOSETRON PER 25 MCG: Performed by: INTERNAL MEDICINE

## 2020-01-01 PROCEDURE — 96523 IRRIG DRUG DELIVERY DEVICE: CPT

## 2020-01-01 PROCEDURE — 25010000002 LEUCOVORIN CALCIUM PER 50 MG: Performed by: NURSE PRACTITIONER

## 2020-01-01 PROCEDURE — 99213 OFFICE O/P EST LOW 20 MIN: CPT | Performed by: INTERNAL MEDICINE

## 2020-01-01 PROCEDURE — 25010000002 BEVACIZUMAB-AWWB 100 MG/4ML SOLUTION 4 ML VIAL: Performed by: NURSE PRACTITIONER

## 2020-01-01 PROCEDURE — 25010000002 LEUCOVORIN 500 MG RECONSTITUTED SOLUTION 1 EACH VIAL: Performed by: INTERNAL MEDICINE

## 2020-01-01 PROCEDURE — 25010000002 LEUCOVORIN CALCIUM PER 50 MG: Performed by: INTERNAL MEDICINE

## 2020-01-01 PROCEDURE — 25010000002 PALONOSETRON 0.25 MG/5ML SOLUTION PREFILLED SYRINGE: Performed by: NURSE PRACTITIONER

## 2020-01-01 PROCEDURE — 25010000002 PALONOSETRON PER 25 MCG: Performed by: NURSE PRACTITIONER

## 2020-01-01 PROCEDURE — 80053 COMPREHEN METABOLIC PANEL: CPT | Performed by: INTERNAL MEDICINE

## 2020-01-01 PROCEDURE — 25010000003 HEPARIN LOCK FLUSH PER 10 UNITS: Performed by: INTERNAL MEDICINE

## 2020-01-01 PROCEDURE — 96365 THER/PROPH/DIAG IV INF INIT: CPT

## 2020-01-01 PROCEDURE — 0 DIATRIZOATE MEGLUMINE & SODIUM PER 1 ML: Performed by: EMERGENCY MEDICINE

## 2020-01-01 PROCEDURE — 99213 OFFICE O/P EST LOW 20 MIN: CPT | Performed by: NURSE PRACTITIONER

## 2020-01-01 PROCEDURE — 85025 COMPLETE CBC W/AUTO DIFF WBC: CPT | Performed by: EMERGENCY MEDICINE

## 2020-01-01 PROCEDURE — 25010000002 PALONOSETRON 0.25 MG/5ML SOLUTION PREFILLED SYRINGE: Performed by: INTERNAL MEDICINE

## 2020-01-01 RX ORDER — HEPARIN SODIUM (PORCINE) LOCK FLUSH IV SOLN 100 UNIT/ML 100 UNIT/ML
500 SOLUTION INTRAVENOUS AS NEEDED
Status: DISCONTINUED | OUTPATIENT
Start: 2020-01-01 | End: 2020-01-01 | Stop reason: HOSPADM

## 2020-01-01 RX ORDER — ACETAMINOPHEN 325 MG/1
650 TABLET ORAL ONCE
Status: COMPLETED | OUTPATIENT
Start: 2020-01-01 | End: 2020-01-01

## 2020-01-01 RX ORDER — SODIUM CHLORIDE 9 MG/ML
250 INJECTION, SOLUTION INTRAVENOUS ONCE
Status: COMPLETED | OUTPATIENT
Start: 2020-01-01 | End: 2020-01-01

## 2020-01-01 RX ORDER — AMLODIPINE BESYLATE 5 MG/1
5 TABLET ORAL DAILY
COMMUNITY

## 2020-01-01 RX ORDER — HEPARIN SODIUM (PORCINE) LOCK FLUSH IV SOLN 100 UNIT/ML 100 UNIT/ML
500 SOLUTION INTRAVENOUS AS NEEDED
Status: CANCELLED | OUTPATIENT
Start: 2020-01-01

## 2020-01-01 RX ORDER — FAMOTIDINE 10 MG/ML
20 INJECTION, SOLUTION INTRAVENOUS ONCE
Status: COMPLETED | OUTPATIENT
Start: 2020-01-01 | End: 2020-01-01

## 2020-01-01 RX ORDER — OMEPRAZOLE 20 MG/1
20 CAPSULE, DELAYED RELEASE ORAL DAILY
Qty: 30 CAPSULE | Refills: 2 | Status: SHIPPED | OUTPATIENT
Start: 2020-01-01

## 2020-01-01 RX ORDER — ONDANSETRON HYDROCHLORIDE 8 MG/1
8 TABLET, FILM COATED ORAL 3 TIMES DAILY PRN
Qty: 30 TABLET | Refills: 5 | Status: SHIPPED | OUTPATIENT
Start: 2020-01-01

## 2020-01-01 RX ORDER — FAMOTIDINE 10 MG/ML
20 INJECTION, SOLUTION INTRAVENOUS ONCE
Status: CANCELLED | OUTPATIENT
Start: 2020-01-01

## 2020-01-01 RX ORDER — SODIUM CHLORIDE 9 MG/ML
250 INJECTION, SOLUTION INTRAVENOUS ONCE
Status: DISCONTINUED | OUTPATIENT
Start: 2020-01-01 | End: 2020-01-01 | Stop reason: HOSPADM

## 2020-01-01 RX ORDER — SODIUM CHLORIDE 9 MG/ML
250 INJECTION, SOLUTION INTRAVENOUS ONCE
Status: CANCELLED | OUTPATIENT
Start: 2020-01-01

## 2020-01-01 RX ORDER — MEPERIDINE HYDROCHLORIDE 50 MG/ML
25 INJECTION INTRAMUSCULAR; INTRAVENOUS; SUBCUTANEOUS
Status: CANCELLED | OUTPATIENT
Start: 2020-01-01

## 2020-01-01 RX ORDER — HEPARIN SODIUM (PORCINE) LOCK FLUSH IV SOLN 100 UNIT/ML 100 UNIT/ML
300 SOLUTION INTRAVENOUS ONCE
Status: DISCONTINUED | OUTPATIENT
Start: 2020-01-01 | End: 2020-01-01

## 2020-01-01 RX ORDER — ACETAMINOPHEN 325 MG/1
650 TABLET ORAL ONCE
Status: CANCELLED | OUTPATIENT
Start: 2020-01-01

## 2020-01-01 RX ORDER — LEVOTHYROXINE SODIUM 0.05 MG/1
1 TABLET ORAL DAILY
COMMUNITY
Start: 2020-01-01

## 2020-01-01 RX ORDER — LOSARTAN POTASSIUM 50 MG/1
1 TABLET ORAL DAILY
COMMUNITY
Start: 2020-01-01

## 2020-01-01 RX ORDER — PALONOSETRON 0.05 MG/ML
0.25 INJECTION, SOLUTION INTRAVENOUS ONCE
Status: COMPLETED | OUTPATIENT
Start: 2020-01-01 | End: 2020-01-01

## 2020-01-01 RX ORDER — HYDROCODONE BITARTRATE AND ACETAMINOPHEN 5; 325 MG/1; MG/1
TABLET ORAL
Qty: 150 TABLET | Refills: 0 | Status: SHIPPED | OUTPATIENT
Start: 2020-01-01

## 2020-01-01 RX ORDER — FLUOROURACIL 50 MG/ML
400 INJECTION, SOLUTION INTRAVENOUS ONCE
Status: COMPLETED | OUTPATIENT
Start: 2020-01-01 | End: 2020-01-01

## 2020-01-01 RX ORDER — ATROPINE SULFATE 1 MG/ML
0.25 INJECTION, SOLUTION INTRAMUSCULAR; INTRAVENOUS; SUBCUTANEOUS
Status: DISCONTINUED | OUTPATIENT
Start: 2020-01-01 | End: 2020-01-01 | Stop reason: HOSPADM

## 2020-01-01 RX ORDER — DIPHENHYDRAMINE HYDROCHLORIDE 50 MG/ML
50 INJECTION INTRAMUSCULAR; INTRAVENOUS AS NEEDED
Status: CANCELLED | OUTPATIENT
Start: 2020-01-01

## 2020-01-01 RX ORDER — FAMOTIDINE 10 MG/ML
20 INJECTION, SOLUTION INTRAVENOUS AS NEEDED
Status: CANCELLED | OUTPATIENT
Start: 2020-01-01

## 2020-01-01 RX ORDER — PROCHLORPERAZINE MALEATE 10 MG
10 TABLET ORAL EVERY 6 HOURS PRN
Qty: 60 TABLET | Refills: 2 | Status: SHIPPED | OUTPATIENT
Start: 2020-01-01

## 2020-01-01 RX ORDER — METHYLPREDNISOLONE SODIUM SUCCINATE 125 MG/2ML
125 INJECTION, POWDER, LYOPHILIZED, FOR SOLUTION INTRAMUSCULAR; INTRAVENOUS ONCE
Status: COMPLETED | OUTPATIENT
Start: 2020-01-01 | End: 2020-01-01

## 2020-01-01 RX ORDER — FLUOROURACIL 50 MG/ML
400 INJECTION, SOLUTION INTRAVENOUS ONCE
Status: CANCELLED | OUTPATIENT
Start: 2020-01-01

## 2020-01-01 RX ORDER — METHYLPREDNISOLONE SODIUM SUCCINATE 125 MG/2ML
125 INJECTION, POWDER, LYOPHILIZED, FOR SOLUTION INTRAMUSCULAR; INTRAVENOUS ONCE
Status: CANCELLED | OUTPATIENT
Start: 2020-01-01

## 2020-01-01 RX ORDER — ATROPINE SULFATE 1 MG/ML
0.25 INJECTION, SOLUTION INTRAMUSCULAR; INTRAVENOUS; SUBCUTANEOUS
Status: COMPLETED | OUTPATIENT
Start: 2020-01-01 | End: 2020-01-01

## 2020-01-01 RX ORDER — FERROUS SULFATE 325(65) MG
325 TABLET ORAL
COMMUNITY

## 2020-01-01 RX ORDER — ATROPINE SULFATE 1 MG/ML
0.25 INJECTION, SOLUTION INTRAMUSCULAR; INTRAVENOUS; SUBCUTANEOUS
Status: CANCELLED | OUTPATIENT
Start: 2020-01-01

## 2020-01-01 RX ORDER — TRAMADOL HYDROCHLORIDE 50 MG/1
TABLET ORAL
Qty: 45 TABLET | Refills: 0 | Status: SHIPPED | OUTPATIENT
Start: 2020-01-01

## 2020-01-01 RX ORDER — PALONOSETRON 0.05 MG/ML
0.25 INJECTION, SOLUTION INTRAVENOUS ONCE
Status: CANCELLED | OUTPATIENT
Start: 2020-01-01

## 2020-01-01 RX ORDER — ACETAMINOPHEN 500 MG
500 TABLET ORAL EVERY 6 HOURS PRN
COMMUNITY

## 2020-01-01 RX ORDER — TRAMADOL HYDROCHLORIDE 50 MG/1
TABLET ORAL
Qty: 45 TABLET | Refills: 0 | Status: SHIPPED | OUTPATIENT
Start: 2020-01-01 | End: 2020-01-01 | Stop reason: SDUPTHER

## 2020-01-01 RX ADMIN — FLUOROURACIL 4000 MG: 50 INJECTION, SOLUTION INTRAVENOUS at 13:36

## 2020-01-01 RX ADMIN — SODIUM CHLORIDE 250 ML: 9 INJECTION, SOLUTION INTRAVENOUS at 13:16

## 2020-01-01 RX ADMIN — CETUXIMAB 400 MG: 2 SOLUTION INTRAVENOUS at 12:25

## 2020-01-01 RX ADMIN — BEVACIZUMAB-AWWB 300 MG: 100 INJECTION, SOLUTION INTRAVENOUS at 10:27

## 2020-01-01 RX ADMIN — DEXAMETHASONE SODIUM PHOSPHATE 12 MG: 4 INJECTION, SOLUTION INTRAMUSCULAR; INTRAVENOUS at 10:17

## 2020-01-01 RX ADMIN — LEUCOVORIN CALCIUM 700 MG: 200 INJECTION, POWDER, LYOPHILIZED, FOR SOLUTION INTRAMUSCULAR; INTRAVENOUS at 11:44

## 2020-01-01 RX ADMIN — FLUOROURACIL 700 MG: 50 INJECTION, SOLUTION INTRAVENOUS at 13:56

## 2020-01-01 RX ADMIN — FAMOTIDINE 20 MG: 10 INJECTION INTRAVENOUS at 11:51

## 2020-01-01 RX ADMIN — SODIUM CHLORIDE 250 ML: 9 INJECTION, SOLUTION INTRAVENOUS at 11:48

## 2020-01-01 RX ADMIN — BEVACIZUMAB-AWWB 300 MG: 100 INJECTION, SOLUTION INTRAVENOUS at 12:29

## 2020-01-01 RX ADMIN — ACETAMINOPHEN 650 MG: 325 TABLET ORAL at 13:14

## 2020-01-01 RX ADMIN — SODIUM CHLORIDE 250 ML: 9 INJECTION, SOLUTION INTRAVENOUS at 11:47

## 2020-01-01 RX ADMIN — DIPHENHYDRAMINE HYDROCHLORIDE 50 MG: 50 INJECTION INTRAMUSCULAR; INTRAVENOUS at 13:23

## 2020-01-01 RX ADMIN — FLUOROURACIL 700 MG: 50 INJECTION, SOLUTION INTRAVENOUS at 14:19

## 2020-01-01 RX ADMIN — HEPARIN 500 UNITS: 100 SYRINGE at 13:42

## 2020-01-01 RX ADMIN — CETUXIMAB 400 MG: 2 SOLUTION INTRAVENOUS at 13:47

## 2020-01-01 RX ADMIN — HEPARIN 500 UNITS: 100 SYRINGE at 14:13

## 2020-01-01 RX ADMIN — ATROPINE SULFATE 0.25 MG: 1 INJECTION, SOLUTION INTRAMUSCULAR; INTRAVENOUS; SUBCUTANEOUS at 12:14

## 2020-01-01 RX ADMIN — BEVACIZUMAB-AWWB 300 MG: 100 INJECTION, SOLUTION INTRAVENOUS at 10:32

## 2020-01-01 RX ADMIN — SODIUM CHLORIDE 12 MG: 900 INJECTION, SOLUTION INTRAVENOUS at 10:47

## 2020-01-01 RX ADMIN — HEPARIN 500 UNITS: 100 SYRINGE at 13:05

## 2020-01-01 RX ADMIN — HEPARIN SODIUM (PORCINE) LOCK FLUSH IV SOLN 100 UNIT/ML 500 UNITS: 100 SOLUTION at 12:30

## 2020-01-01 RX ADMIN — FAMOTIDINE 20 MG: 10 INJECTION INTRAVENOUS at 13:46

## 2020-01-01 RX ADMIN — HEPARIN 500 UNITS: 100 SYRINGE at 14:02

## 2020-01-01 RX ADMIN — FAMOTIDINE 20 MG: 10 INJECTION INTRAVENOUS at 13:19

## 2020-01-01 RX ADMIN — FLUOROURACIL 4000 MG: 50 INJECTION, SOLUTION INTRAVENOUS at 14:05

## 2020-01-01 RX ADMIN — ACETAMINOPHEN 650 MG: 325 TABLET ORAL at 11:46

## 2020-01-01 RX ADMIN — HEPARIN 500 UNITS: 100 SYRINGE at 14:55

## 2020-01-01 RX ADMIN — METHYLPREDNISOLONE SODIUM SUCCINATE 125 MG: 125 INJECTION, POWDER, FOR SOLUTION INTRAMUSCULAR; INTRAVENOUS at 11:48

## 2020-01-01 RX ADMIN — HEPARIN 500 UNITS: 100 SYRINGE at 15:04

## 2020-01-01 RX ADMIN — IRINOTECAN HYDROCHLORIDE 300 MG: 20 INJECTION, SOLUTION INTRAVENOUS at 12:21

## 2020-01-01 RX ADMIN — ATROPINE SULFATE 0.25 MG: 1 INJECTION, SOLUTION INTRAMUSCULAR; INTRAVENOUS; SUBCUTANEOUS at 11:55

## 2020-01-01 RX ADMIN — CETUXIMAB 400 MG: 2 SOLUTION INTRAVENOUS at 13:58

## 2020-01-01 RX ADMIN — SODIUM CHLORIDE 250 ML: 9 INJECTION, SOLUTION INTRAVENOUS at 12:22

## 2020-01-01 RX ADMIN — METHYLPREDNISOLONE SODIUM SUCCINATE 125 MG: 125 INJECTION, POWDER, FOR SOLUTION INTRAMUSCULAR; INTRAVENOUS at 13:42

## 2020-01-01 RX ADMIN — LEUCOVORIN CALCIUM 700 MG: 500 INJECTION, POWDER, LYOPHILIZED, FOR SOLUTION INTRAMUSCULAR; INTRAVENOUS at 11:56

## 2020-01-01 RX ADMIN — SODIUM CHLORIDE 250 ML: 9 INJECTION, SOLUTION INTRAVENOUS at 10:45

## 2020-01-01 RX ADMIN — DEXAMETHASONE SODIUM PHOSPHATE 12 MG: 4 INJECTION, SOLUTION INTRAMUSCULAR; INTRAVENOUS at 12:08

## 2020-01-01 RX ADMIN — ACETAMINOPHEN 650 MG: 325 TABLET ORAL at 13:13

## 2020-01-01 RX ADMIN — IRINOTECAN HYDROCHLORIDE 300 MG: 20 INJECTION, SOLUTION INTRAVENOUS at 13:59

## 2020-01-01 RX ADMIN — FLUOROURACIL 700 MG: 50 INJECTION, SOLUTION INTRAVENOUS at 15:47

## 2020-01-01 RX ADMIN — LEUCOVORIN CALCIUM 700 MG: 500 INJECTION, POWDER, LYOPHILIZED, FOR SOLUTION INTRAMUSCULAR; INTRAVENOUS at 13:59

## 2020-01-01 RX ADMIN — BEVACIZUMAB-AWWB 300 MG: 100 INJECTION, SOLUTION INTRAVENOUS at 11:11

## 2020-01-01 RX ADMIN — FAMOTIDINE 20 MG: 10 INJECTION, SOLUTION INTRAVENOUS at 11:49

## 2020-01-01 RX ADMIN — ATROPINE SULFATE 0.25 MG: 1 INJECTION, SOLUTION INTRAMUSCULAR; INTRAVENOUS; SUBCUTANEOUS at 11:42

## 2020-01-01 RX ADMIN — ACETAMINOPHEN 650 MG: 325 TABLET ORAL at 13:42

## 2020-01-01 RX ADMIN — IRINOTECAN HYDROCHLORIDE 300 MG: 20 INJECTION, SOLUTION INTRAVENOUS at 11:44

## 2020-01-01 RX ADMIN — DEXAMETHASONE SODIUM PHOSPHATE 12 MG: 4 INJECTION, SOLUTION INTRA-ARTICULAR; INTRALESIONAL; INTRAMUSCULAR; INTRAVENOUS; SOFT TISSUE at 10:03

## 2020-01-01 RX ADMIN — METHYLPREDNISOLONE SODIUM SUCCINATE 125 MG: 125 INJECTION, POWDER, FOR SOLUTION INTRAMUSCULAR; INTRAVENOUS at 11:49

## 2020-01-01 RX ADMIN — DIPHENHYDRAMINE HYDROCHLORIDE 50 MG: 50 INJECTION INTRAMUSCULAR; INTRAVENOUS at 13:48

## 2020-01-01 RX ADMIN — DIPHENHYDRAMINE HYDROCHLORIDE 50 MG: 50 INJECTION INTRAMUSCULAR; INTRAVENOUS at 11:55

## 2020-01-01 RX ADMIN — DIPHENHYDRAMINE HYDROCHLORIDE 50 MG: 50 INJECTION INTRAMUSCULAR; INTRAVENOUS at 13:21

## 2020-01-01 RX ADMIN — HEPARIN 500 UNITS: 100 SYRINGE at 15:23

## 2020-01-01 RX ADMIN — FLUOROURACIL 4000 MG: 50 INJECTION, SOLUTION INTRAVENOUS at 14:25

## 2020-01-01 RX ADMIN — FAMOTIDINE 20 MG: 10 INJECTION INTRAVENOUS at 13:23

## 2020-01-01 RX ADMIN — FLUOROURACIL 700 MG: 50 INJECTION, SOLUTION INTRAVENOUS at 13:26

## 2020-01-01 RX ADMIN — CETUXIMAB 400 MG: 2 SOLUTION INTRAVENOUS at 12:58

## 2020-01-01 RX ADMIN — SODIUM CHLORIDE 250 ML: 9 INJECTION, SOLUTION INTRAVENOUS at 12:06

## 2020-01-01 RX ADMIN — PALONOSETRON HYDROCHLORIDE 0.25 MG: 0.25 INJECTION INTRAVENOUS at 12:06

## 2020-01-01 RX ADMIN — ACETAMINOPHEN 650 MG: 325 TABLET ORAL at 12:20

## 2020-01-01 RX ADMIN — PALONOSETRON 0.25 MG: 0.25 INJECTION, SOLUTION INTRAVENOUS at 10:12

## 2020-01-01 RX ADMIN — HEPARIN 500 UNITS: 100 SYRINGE at 14:06

## 2020-01-01 RX ADMIN — ATROPINE SULFATE 0.25 MG: 1 INJECTION, SOLUTION INTRAMUSCULAR; INTRAVENOUS; SUBCUTANEOUS at 13:41

## 2020-01-01 RX ADMIN — METHYLPREDNISOLONE SODIUM SUCCINATE 125 MG: 125 INJECTION, POWDER, FOR SOLUTION INTRAMUSCULAR; INTRAVENOUS at 13:16

## 2020-01-01 RX ADMIN — HEPARIN 500 UNITS: 100 SYRINGE at 15:03

## 2020-01-01 RX ADMIN — HEPARIN SODIUM (PORCINE) LOCK FLUSH IV SOLN 100 UNIT/ML 500 UNITS: 100 SOLUTION at 13:05

## 2020-01-01 RX ADMIN — METHYLPREDNISOLONE SODIUM SUCCINATE 125 MG: 125 INJECTION, POWDER, FOR SOLUTION INTRAMUSCULAR; INTRAVENOUS at 13:17

## 2020-01-01 RX ADMIN — CETUXIMAB 400 MG: 2 SOLUTION INTRAVENOUS at 14:13

## 2020-01-01 RX ADMIN — LEUCOVORIN CALCIUM 700 MG: 500 INJECTION, POWDER, LYOPHILIZED, FOR SOLUTION INTRAMUSCULAR; INTRAVENOUS at 12:20

## 2020-01-01 RX ADMIN — SODIUM CHLORIDE 250 ML: 9 INJECTION, SOLUTION INTRAVENOUS at 13:41

## 2020-01-01 RX ADMIN — HEPARIN 500 UNITS: 100 SYRINGE at 13:38

## 2020-01-01 RX ADMIN — HEPARIN 500 UNITS: 100 SYRINGE at 13:16

## 2020-01-01 RX ADMIN — ATROPINE SULFATE 0.25 MG: 1 INJECTION, SOLUTION INTRAMUSCULAR; INTRAVENOUS; SUBCUTANEOUS at 13:55

## 2020-01-01 RX ADMIN — IRINOTECAN HYDROCHLORIDE 300 MG: 20 INJECTION, SOLUTION INTRAVENOUS at 11:57

## 2020-01-01 RX ADMIN — FLUOROURACIL 4000 MG: 50 INJECTION, SOLUTION INTRAVENOUS at 16:05

## 2020-01-01 RX ADMIN — HEPARIN 500 UNITS: 100 SYRINGE at 14:18

## 2020-01-01 RX ADMIN — PALONOSETRON 0.25 MG: 0.25 INJECTION, SOLUTION INTRAVENOUS at 10:45

## 2020-01-01 RX ADMIN — DIPHENHYDRAMINE HYDROCHLORIDE 50 MG: 50 INJECTION INTRAMUSCULAR; INTRAVENOUS at 12:00

## 2020-01-01 RX ADMIN — HEPARIN 500 UNITS: 100 SYRINGE at 13:30

## 2020-01-01 RX ADMIN — DIATRIZOATE MEGLUMINE AND DIATRIZOATE SODIUM 120 ML: 660; 100 LIQUID ORAL; RECTAL at 17:55

## 2020-01-01 RX ADMIN — HEPARIN 500 UNITS: 100 SYRINGE at 12:15

## 2020-01-01 RX ADMIN — SODIUM CHLORIDE 250 ML: 9 INJECTION, SOLUTION INTRAVENOUS at 13:14

## 2020-01-01 RX ADMIN — HEPARIN 500 UNITS: 100 SYRINGE at 11:43

## 2020-01-01 RX ADMIN — HEPARIN 500 UNITS: 100 SYRINGE at 13:37

## 2020-01-01 RX ADMIN — DIPHENHYDRAMINE HYDROCHLORIDE 50 MG: 50 INJECTION INTRAMUSCULAR; INTRAVENOUS at 12:30

## 2020-01-01 RX ADMIN — FAMOTIDINE 20 MG: 10 INJECTION INTRAVENOUS at 12:27

## 2020-01-01 RX ADMIN — HEPARIN 500 UNITS: 100 SYRINGE at 13:40

## 2020-01-01 RX ADMIN — METHYLPREDNISOLONE SODIUM SUCCINATE 125 MG: 125 INJECTION, POWDER, FOR SOLUTION INTRAMUSCULAR; INTRAVENOUS at 12:22

## 2020-01-01 RX ADMIN — CETUXIMAB 400 MG: 2 SOLUTION INTRAVENOUS at 12:16

## 2020-01-01 RX ADMIN — ACETAMINOPHEN 650 MG: 325 TABLET ORAL at 11:45

## 2020-01-01 RX ADMIN — SODIUM CHLORIDE 250 ML: 9 INJECTION, SOLUTION INTRAVENOUS at 10:00

## 2020-01-01 RX ADMIN — PALONOSETRON HYDROCHLORIDE 0.25 MG: 0.25 INJECTION INTRAVENOUS at 10:01

## 2020-01-23 NOTE — PROGRESS NOTES
CHIEF COMPLAINT: 1.  Abdominal discomfort, constipation  2.  Dry skin  3.  Follow up metastatic colon cancer    Problem List:  Oncology/Hematology History    1.  Stage IIIB [id number] moderately differentiated colon cancer  2.   History of breast cancer 2003 treated with chemotherapy and radiation with Dr. Rivers  3.   History of cervical cancer treated with radiation in 1993    -3/25/2019 screening colonoscopy found 5.5 cm tumor at 50 cm near the splenic fracture which was tattooed.  This showed moderately differentiated adenocarcinoma and there was a separate hyperplastic polyp 5 mm at 15 cm.  -3/27/2019 CT chest abdomen pelvis showed tumor at the splenic flexure T3 with transmural invasion and no distant metastases.  Positive gallstones and hiatal hernia.  CEA 2.9 with normal CMP.  -4/3/2019 transverse colectomy to the splenic flexure with primary re-anastomosis revealed moderately differentiated adenocarcinoma extending to the serosa/mesenteric fat with 8 out of 12 lymph nodes involved the largest being 2 cm.  Margins negative.  T3N2B stage IIIb  -4/30/2019 initial medical oncology visit: I reviewed her history.  She has 3 first-degree relatives with breast cancer, thyroid cancer in her family, prostate cancer in her first-degree relatives, and has had 2 other malignancies prior to her colon cancer as outlined above.  I have spoken with Dr. Freddy Esquivel to get mismatch repair testing.  She has seen Dr. Adam Royal who, because of the extent of this next to the spleen feels that localized radiation may be reasonable while not standard.  -5/14/2019 office visit: PCR showed she was mismatch repair proficient; therefore, consider Xeloda radiation followed by 6 months of Xeloda oxaliplatin.  Coordinated this with Dr. Royal who will start radiation May 27.  Following that we will start Xeloda oxaliplatin.  She will take 3 tablets of Xeloda twice a day Monday through Friday with radiation.  She will get a  chemotherapy education appointment with our pharmacy doctorate's in [place].  Though mismatch repair proficient, we will still have her see genetic counseling    -6/5/2019 genetic counseling negative    -Began radiation along with Xeloda 6/4/19 and completed 7/15/19    -7/22/2019 CT chest abdomen and pelvis showed progression of disease with extensive peritoneal carcinomatosis    -7/30/2019 office visit: I reviewed the results and images of her CT chest abdomen pelvis showing peritoneal carcinomatosis.  July 9 CEA elevated at 8.4.  Discussed with patient the plan to continue with the Xeloda oxaliplatin but to add Avastin.  Spoken with surgical oncologist at [facility] during a meeting who performs HIPEC.  He suggested primary chemotherapy and if get a response then send to surgeon accordingly.  I plan to give her Avastin Xeloda oxaliplatin and send strata as this is acting like a BRAF mutated cancer.  If that turns out to be the case, I will switch her to cetuximab, Encorafenib, and Binimetinib.  If we get a peritoneal response without distant metastatic visceral disease of the liver or lung appearing, then I will get her to Dr. Sims at the [facility] for consideration of peritoneal debulking with PCI scoring and possible HI PEC.  She understands the palliative nature of our attempts at this junction.    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  Microsatellite stable.  TMB low.  PD-L1 low.  Potential relevant therapies include Binimetinib + Encorafenib, see Strata report for full list.     -10/28/2019CT chest abdomen pelvis shows previous granulomatous disease with some emphysematous changes, small left pleural effusion with atelectasis or pneumonitis with decrease in size compared to prior CT.  1.3 cm hepatic dome lesion likely related to serosal implant with mild perihepatic ascites.  Has perisplenic carcinomatosis with additional peritoneal  carcinomatosis.  Compared to July the peritoneal carcinomatosis has progressed diffusely including down into the deep pelvis with numerous implants demonstrated.    -10/29/2019 [facility], [place], operated by [facility] medical oncology follow-up visit: I reviewed the images and reports of the CT scan above compared to July where there is clear-cut progression.  CEA had come down from 14.5 at the peak end of August to 4.9 as of 10/9/2019, clearly there is peritoneal progression on CT and she has poorly tolerated the Xeloda oxaliplatin Avastin.  Hence we will, based on that B BRAF V600 E mutation, place her on cetuximab, Binimetinib, and Encorafenib which is the most active regimen for this particular mutation and colorectal cancers.  We will check her ejection fraction and have her see my nurse practitioner back in a week to make sure that is doing well and start her into on this regimen and will have my pharmacy staff educate her on this.  Went over the side effects including rashes and diarrhea.  She is currently having diarrhea from the Xeloda and I will is on Lomotil and Imodium which hopefully will subside over the next week.    -11/1/2019 echocardiogram with normal left ventricular systolic function calculated EF 59%.        Overlapping malignant neoplasm of colon (CMS/HCC)    4/30/2019 Initial Diagnosis     Overlapping malignant neoplasm of colon (CMS/HCC)      6/4/2019 - 7/9/2019 Chemotherapy/Radiation     OP COLON Capecitabine 825 mg/m2 M-F            6/5/2019 Genetic Testing      Genetic testing was negative for mutations in BRCA1/2 and all 32 additional genes on the CancerNext Panel.           7/22/2019 Progression     CT chest abdomen and pelvis revealed rather extensive diffuse peritoneal carcinomatosis, at least 15-20 peritoneal nodules measuring between 1 cm in many as large as 2-3 cm scattered throughout the peritoneal cavity, significantly progressed from earlier this year.  No metastasis in the chest.  Small left pleural effusion  with associated mild left bibasilar atelectasis, otherwise clear lungs.      8/8/2019 - 10/21/2019 Chemotherapy     OP COLON CapeOX+ Bevacizumab (Capecitabine / OXALIplatin / Bevacizumab)        10/28/2019 Progression     Compared to July the peritoneal carcinomatosis has progressed diffusely including down into the deep pelvis with numerous implants demonstrated.        10/28/2019 Imaging     CT chest abdomen pelvis shows previous granulomatous disease with some emphysematous changes, small left pleural effusion with atelectasis or pneumonitis with decrease in size compared to prior CT.  1.3 cm hepatic dome lesion likely related to serosal implant with mild perihepatic ascites.  Has perisplenic carcinomatosis with additional peritoneal carcinomatosis.  Compared to July the peritoneal carcinomatosis has progressed diffusely including down into the deep pelvis with numerous implants demonstrated.      11/1/2019 -  Other Event     Echocardiogram  · Left ventricular systolic function is normal.  · Calculated EF = 59.0%. Normal Global Longitudinal LV strain = -20.5%.  · Left ventricular diastolic dysfunction (grade II) consistent with pseudonormalization.  · Left atrial cavity size is mildly dilated.      11/14/2019 -  Chemotherapy     OP COLORECTAL Cetuximab / Binimetinib / Encorafenib         12/11/2019 -  Other Event     Echocardiogram  · Left ventricular systolic function is normal.  · Calculated EF = 72.0%  · Normal global longitudinal strain.         HISTORY OF PRESENT ILLNESS:  The patient is a 64 y.o. female, here for follow up on management of metastatic colon cancer with peritoneal carcinomatosis.  The patient continues on therapy with Cetuximab weekly, along with Binimetinib and Encorafenib.  No notable acneiform lesions or rash but she does have some small cracks on a few fingers and her skin is dry.  She is using aloe vera gel.  Her appetite has decreased some, she is having a little more abdominal  discomfort and low back discomfort.  Also has some acid reflux/dyspepsia.  Weight is stable.  She is constipated.  Mild fatigue.  She has noted some unusual facial hair growth on her upper and lower lip.      History reviewed. No pertinent past medical history.  Past Surgical History:   Procedure Laterality Date   • BREAST LUMPECTOMY Left 2003    [place]   • COLON SURGERY  2019    [facility] Dr. Hammer       Allergies   Allergen Reactions   • Penicillins Nausea And Vomiting       Family History and Social History reviewed and changed as necessary      REVIEW OF SYSTEM:   Review of Systems   Constitutional: Negative for appetite change, chills, diaphoresis, fever and unexpected weight change.  Positive for mild fatigue.  HENT:   Negative for mouth sores, sore throat and trouble swallowing.    Eyes: Negative for icterus.   Respiratory: Negative for cough, hemoptysis and shortness of breath.    Cardiovascular: Negative for chest pain, leg swelling and palpitations.   Gastrointestinal: Negative for abdominal distention, blood in stool, diarrhea, nausea and vomiting. Positive for constipation and abdominal discomfort, some acid reflux/dyspepsia.  Endocrine: Negative for hot flashes.   Genitourinary: Negative for bladder incontinence, difficulty urinating, dysuria, frequency and hematuria.    Musculoskeletal: Negative for gait problem, neck pain and neck stiffness.   Skin: Negative for rash. Positive for dry skin on the hands and a few small cracks.  Neurological: Negative for dizziness, gait problem, headaches, light-headedness and numbness. Positive for low back discomfort.  Hematological: Negative for adenopathy. Does not bruise/bleed easily.   Psychiatric/Behavioral: Negative for depression. The patient is not nervous/anxious.    All other systems reviewed and are negative.       PHYSICAL EXAM    Vitals:    01/23/20 1121   BP: 114/56   Pulse: 101   Resp: 18   Temp: 97.8 °F (36.6 °C)   Weight: 65.8 kg (145 lb)      Constitutional: Petite, thin framed female, here with her . No distress.   ECOG: (1) Restricted in physically strenuous activity, ambulatory and able to do work of light nature  HENT:   Head: Normocephalic.   Mouth/Throat: Oropharynx is clear and moist.   Eyes: Conjunctivae are normal. Pupils are equal, round, and reactive. No scleral icterus.   Neck: Neck supple. No JVD present. No thyromegaly present.   Pulmonary/Chest: No respiratory distress.   Abdominal: Soft. Exhibits no distension.   Neurological: Alert and oriented to person, place, and time. Exhibits normal muscle tone.   Skin: No ecchymosis, no petechiae and no rash noted. Not diaphoretic. No cyanosis. Nails show no clubbing. Skin on the hands is a little dry, a few very tiny cracks at the DIP joints and MCP joint.  Skin is dry on the face.  Psychiatric: Normal mood and affect.   Vitals reviewed.  Labs reviewed.    Lab Results   Component Value Date    HGB 10.2 (L) 01/22/2020    HCT 31.7 (L) 01/22/2020    MCV 91 01/22/2020     (H) 01/22/2020    WBC 6.6 01/22/2020    NEUTROABS 5.0 01/22/2020    LYMPHSABS 0.6 (L) 01/22/2020    MONOSABS 0.6 01/22/2020    EOSABS 0.3 01/22/2020    BASOSABS 0.0 01/22/2020       Lab Results   Component Value Date    GLUCOSE 143 (H) 11/14/2019    BUN 12 01/22/2020    CREATININE 0.53 (L) 01/22/2020     01/22/2020    K 4.1 01/22/2020     01/22/2020    CO2 24 01/22/2020    CALCIUM 9.0 01/22/2020    PROTEINTOT 7.2 11/14/2019    ALBUMIN 3.6 (L) 01/22/2020    BILITOT <0.2 01/22/2020    ALKPHOS 112 01/22/2020    AST 14 01/22/2020    ALT 19 01/22/2020         ASSESSMENT & PLAN:    1.  Stage IIIB [id number] moderately differentiated colon cancer with subsequent peritoneal carcinomatosis BRAF V600E mutated  2.   History of breast cancer 2003 treated with chemotherapy and radiation with Dr. Rivers  3.   History of cervical cancer treated with radiation in 1993  4.  Dry skin   5.  Constipation  6.  Abdominal  discomfort  7.  Low back pain  8.  GERD    Discussion: The patient continues to fairly well with weekly cetuximab along with Binimetinib and Encorafenib.  Currently no acneform lesions or rash, her skin is dry.  She has a few very small cracks on her fingers at the joints, I again recommended she use a more emollient cream such as Aquaphor, she is currently only been using aloe vera gel.  We will continue therapy unchanged but may have to consider decreasing dose a little to help her tolerate if her symptoms persist.  Her counts have been unremarkable.  Current echocardiogram in December was normal, will continue to monitor while on therapy per protocol.  Currently with no symptoms suggestive of cardiac issues.  Plan on restaging scans in February and I have ordered those today.  She is constipated, I recommend that she begin a stool softener and take MiraLAX regularly.  She will let me know if this does not help with her abdominal discomfort.  I have also sent in a prescription for omeprazole for reflux.    I spent a total of 25 minutes in direct patient care, greater than 16  minutes (greater than 50%) were spent in coordination of care, and counseling the patient regarding  (diagnosis) . Answered any questions patient had regarding medications and plan of care.     Itzel Curran, APRN    01/23/2020

## 2020-01-29 NOTE — TELEPHONE ENCOUNTER
----- Message from Carlos Eduardo Reyes sent at 1/29/2020  3:07 PM EST -----  Regarding: RE: scans  SCANS HAVE BEEN MOVED UP TO 1- 3PM FOR 4PM PT AWARE   ----- Message -----  From: Itzel Curran APRN  Sent: 1/29/2020  12:37 PM EST  To: Carlos Eduardo Reyes  Subject: scans                                            Carlos Eduardo can you see how soon her scans can be done, see message below.  Thank you  ----- Message -----  From: Meliza Ricardo RN  Sent: 1/29/2020  11:13 AM EST  To: GATITO Rausch, Erin Austin RN    Pt took miralax for 2 days and stopped, she had 2 bms yesterday with no improvement in belly pain. Milagros suggested we try to move scans up. Passing this message along.

## 2020-01-29 NOTE — TELEPHONE ENCOUNTER
Received call from pt through triage line. She c/o ongoing abdominal pain. Pt took the miralax suggested by Itzel for two days and did have two BMs yesterday. Pt did not notice an improvement in belly pain after the bms. Discussed with GATITO Linares. She suggested we try and move her scans up from February. I will pass this along to Team Nikolai.

## 2020-01-30 NOTE — TELEPHONE ENCOUNTER
Spoke with Nadia at [facility].  Patient had CMP yesterday.  Creatinine 0.7.  Results faxed.

## 2020-01-30 NOTE — TELEPHONE ENCOUNTER
[facility] called because pt is scheduled to get a CT tomorrow but they will need an order to check her creatinine before they do the imaging.    Fax to: [phone]    Kendra can be reached at  if there are any questions.

## 2020-02-10 NOTE — TELEPHONE ENCOUNTER
Received call from pt through triage line. Pt has scheduled MD follow up apt tomorrow in [place]. She is calling to report that she has not had any improvement in her ongoing R abdominal pain, and it is actually getting worse. Tylenol does not help, but advil does give her some relief. Pt is able to eat/ drink- she had pancakes this morning. Denies n/v. Offered [facility] [place] [facility] to see a NP. She denied stating she really just wants to see Dr Menchaca, but does not feel as though she can wait until tomorrow. Discussed with Dr Menchaca. Pt added onto his [place] schedule today at 3:30.

## 2020-02-10 NOTE — PROGRESS NOTES
CHIEF COMPLAINT: Colon cancer with peritoneal metastases    Problem List:  Oncology/Hematology History    1.  Stage IIIB [id number] moderately differentiated colon cancer  2.   History of breast cancer 2003 treated with chemotherapy and radiation with Dr. Rivers  3.   History of cervical cancer treated with radiation in 1993    -3/25/2019 screening colonoscopy found 5.5 cm tumor at 50 cm near the splenic fracture which was tattooed.  This showed moderately differentiated adenocarcinoma and there was a separate hyperplastic polyp 5 mm at 15 cm.  -3/27/2019 CT chest abdomen pelvis showed tumor at the splenic flexure T3 with transmural invasion and no distant metastases.  Positive gallstones and hiatal hernia.  CEA 2.9 with normal CMP.  -4/3/2019 transverse colectomy to the splenic flexure with primary re-anastomosis revealed moderately differentiated adenocarcinoma extending to the serosa/mesenteric fat with 8 out of 12 lymph nodes involved the largest being 2 cm.  Margins negative.  T3N2B stage IIIb  -4/30/2019 initial medical oncology visit: I reviewed her history.  She has 3 first-degree relatives with breast cancer, thyroid cancer in her family, prostate cancer in her first-degree relatives, and has had 2 other malignancies prior to her colon cancer as outlined above.  I have spoken with Dr. Freddy Esquivel to get mismatch repair testing.  She has seen Dr. Adam Royal who, because of the extent of this next to the spleen feels that localized radiation may be reasonable while not standard.  -5/14/2019 office visit: PCR showed she was mismatch repair proficient; therefore, consider Xeloda radiation followed by 6 months of Xeloda oxaliplatin.  Coordinated this with Dr. Royal who will start radiation May 27.  Following that we will start Xeloda oxaliplatin.  She will take 3 tablets of Xeloda twice a day Monday through Friday with radiation.  She will get a chemotherapy education appointment with our pharmacy  doctorate's in [place].  Though mismatch repair proficient, we will still have her see genetic counseling    -6/5/2019 genetic counseling negative    -Began radiation along with Xeloda 6/4/19 and completed 7/15/19    -7/22/2019 CT chest abdomen and pelvis showed progression of disease with extensive peritoneal carcinomatosis    -7/30/2019 office visit: I reviewed the results and images of her CT chest abdomen pelvis showing peritoneal carcinomatosis.  July 9 CEA elevated at 8.4.  Discussed with patient the plan to continue with the Xeloda oxaliplatin but to add Avastin.  Spoken with surgical oncologist at [facility] during a meeting who performs HIPEC.  He suggested primary chemotherapy and if get a response then send to surgeon accordingly.  I plan to give her Avastin Xeloda oxaliplatin and send strata as this is acting like a BRAF mutated cancer.  If that turns out to be the case, I will switch her to cetuximab, Encorafenib, and Binimetinib.  If we get a peritoneal response without distant metastatic visceral disease of the liver or lung appearing, then I will get her to Dr. Sims at the [facility] for consideration of peritoneal debulking with PCI scoring and possible HI PEC.  She understands the palliative nature of our attempts at this junction.    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  Microsatellite stable.  TMB low.  PD-L1 low.  Potential relevant therapies include Binimetinib + Encorafenib, see Strata report for full list.     -10/28/2019CT chest abdomen pelvis shows previous granulomatous disease with some emphysematous changes, small left pleural effusion with atelectasis or pneumonitis with decrease in size compared to prior CT.  1.3 cm hepatic dome lesion likely related to serosal implant with mild perihepatic ascites.  Has perisplenic carcinomatosis with additional peritoneal carcinomatosis.  Compared to July the peritoneal carcinomatosis  has progressed diffusely including down into the deep pelvis with numerous implants demonstrated.    -10/29/2019 [facility] medical oncology follow-up visit: I reviewed the images and reports of the CT scan above compared to July where there is clear-cut progression.  CEA had come down from 14.5 at the peak end of August to 4.9 as of 10/9/2019, clearly there is peritoneal progression on CT and she has poorly tolerated the Xeloda oxaliplatin Avastin.  Hence we will, based on that B BRAF V600 E mutation, place her on cetuximab, Binimetinib, and Encorafenib which is the most active regimen for this particular mutation and colorectal cancers.  We will check her ejection fraction and have her see my nurse practitioner back in a week to make sure that is doing well and start her into on this regimen and will have my pharmacy staff educate her on this.  Went over the side effects including rashes and diarrhea.  She is currently having diarrhea from the Xeloda and I will is on Lomotil and Imodium which hopefully will subside over the next week.    -11/1/2019 echocardiogram with normal left ventricular systolic function calculated EF 59%.    -1/31/2020 CT chest abdomen pelvis with contrast at [facility] shows multiple new pleural-based densities left lung concerning for pleural metastatic disease as well as increasing size of subcentimeter left lower lobe nodule.  Mild progression of carcinomatosis predominantly in the omentum compared to 10/24/1999 with a new mild amount of abnormal soft tissue density in the gastrohepatic ligament.  Hemoglobin 10 with platelets 554,000 normal white count.  CMP unremarkable.  Having increasing abdominal pains.  We will switch to Avastin FOLFIRI.             Overlapping malignant neoplasm of colon (CMS/HCC)    4/30/2019 Initial Diagnosis     Overlapping malignant neoplasm of colon (CMS/HCC)      6/4/2019 - 7/9/2019 Chemotherapy/Radiation     OP COLON Capecitabine 825 mg/m2 M-F             6/5/2019 Genetic Testing      Genetic testing was negative for mutations in BRCA1/2 and all 32 additional genes on the CancerNext Panel.           7/22/2019 Progression     CT chest abdomen and pelvis revealed rather extensive diffuse peritoneal carcinomatosis, at least 15-20 peritoneal nodules measuring between 1 cm in many as large as 2-3 cm scattered throughout the peritoneal cavity, significantly progressed from earlier this year.  No metastasis in the chest.  Small left pleural effusion with associated mild left bibasilar atelectasis, otherwise clear lungs.      8/8/2019 - 10/21/2019 Chemotherapy     OP COLON CapeOX+ Bevacizumab (Capecitabine / OXALIplatin / Bevacizumab)        10/28/2019 Progression     Compared to July the peritoneal carcinomatosis has progressed diffusely including down into the deep pelvis with numerous implants demonstrated.        10/28/2019 Imaging     CT chest abdomen pelvis shows previous granulomatous disease with some emphysematous changes, small left pleural effusion with atelectasis or pneumonitis with decrease in size compared to prior CT.  1.3 cm hepatic dome lesion likely related to serosal implant with mild perihepatic ascites.  Has perisplenic carcinomatosis with additional peritoneal carcinomatosis.  Compared to July the peritoneal carcinomatosis has progressed diffusely including down into the deep pelvis with numerous implants demonstrated.      11/1/2019 -  Other Event     Echocardiogram  · Left ventricular systolic function is normal.  · Calculated EF = 59.0%. Normal Global Longitudinal LV strain = -20.5%.  · Left ventricular diastolic dysfunction (grade II) consistent with pseudonormalization.  · Left atrial cavity size is mildly dilated.      11/14/2019 - 2/5/2020 Chemotherapy     OP COLORECTAL Cetuximab / Binimetinib / Encorafenib         12/11/2019 -  Other Event     Echocardiogram  · Left ventricular systolic function is normal.  · Calculated EF = 72.0%  · Normal  global longitudinal strain.      1/31/2020 Progression     CT chest abdomen pelvis shows new pleural metastases, increasing left lower lobe lung nodule, increasing carcinomatosis, and a new gastrohepatic ligament lymph node.        2/18/2020 -  Chemotherapy     OP COLORECTAL FOLFIRI + Bevacizumab-awwb (Irinotecan / Leucovorin / Fluorouracil / Bevacizumab)         HISTORY OF PRESENT ILLNESS:  The patient is a 64 y.o. female, here for follow up on management of colon cancer with peritoneal metastases B bunny mutated with increasing abdominal pain.  I reviewed CT and images thereof with the patient results showing new pleural, increasing left lower lung nodule, increasing carcinomatosis, and new gastrohepatic ligament lymph node.      History reviewed. No pertinent past medical history.  Past Surgical History:   Procedure Laterality Date   • BREAST LUMPECTOMY Left 2003    [facility]   • COLON SURGERY  2019    [facility] [place] [facility]       Allergies   Allergen Reactions   • Penicillins Nausea And Vomiting       Family History and Social History reviewed and changed as necessary      REVIEW OF SYSTEM:   Review of Systems   Constitutional: Negative for appetite change, chills, diaphoresis, fatigue, fever and unexpected weight change.   HENT:   Negative for mouth sores, sore throat and trouble swallowing.    Eyes: Negative for icterus.   Respiratory: Negative for cough, hemoptysis and shortness of breath.    Cardiovascular: Negative for chest pain, leg swelling and palpitations.   Gastrointestinal: Negative for abdominal distention, abdominal pain, blood in stool, constipation, diarrhea, nausea and vomiting.   Endocrine: Negative for hot flashes.   Genitourinary: Negative for bladder incontinence, difficulty urinating, dysuria, frequency and hematuria.    Musculoskeletal: Negative for gait problem, neck pain and neck stiffness.   Skin: Negative for rash.   Neurological: Negative for dizziness, gait problem, headaches,  "light-headedness and numbness.   Hematological: Negative for adenopathy. Does not bruise/bleed easily.   Psychiatric/Behavioral: Negative for depression. The patient is not nervous/anxious.    All other systems reviewed and are negative.       PHYSICAL EXAM    Vitals:    02/10/20 1544   Pulse: 113   Resp: 18   Temp: 99 °F (37.2 °C)   SpO2: 97%   Weight: 65.3 kg (144 lb)   Height: 167.6 cm (66\")     Constitutional: Appears well-developed and well-nourished. No distress.   ECOG: (2) Ambulatory & Capable of Self Care, Unable to Carry Out Work Activity, Up & About Greater Than 50% of Waking Hours  HENT:   Head: Normocephalic.   Mouth/Throat: Oropharynx is clear and moist.   Eyes: Conjunctivae are normal. Pupils are equal, round, and reactive to light. No scleral icterus.   Neck: Neck supple. No JVD present. No thyromegaly present.   Cardiovascular: Normal rate, regular rhythm and normal heart sounds.    Pulmonary/Chest: Breath sounds normal. No respiratory distress.   Abdominal: Soft. Exhibits no distension and no mass. There is no hepatosplenomegaly. There is no tenderness. There is no rebound and no guarding.   Musculoskeletal:Exhibits no edema, tenderness or deformity.   Neurological: Alert and oriented to person, place, and time. Exhibits normal muscle tone.   Skin: No ecchymosis, no petechiae and no rash noted. Not diaphoretic. No cyanosis. Nails show no clubbing.   Psychiatric: Normal mood and affect.   Vitals reviewed.      Lab Results   Component Value Date    HGB 10.0 (L) 02/04/2020    HCT 31.4 (L) 02/04/2020    MCV 87 02/04/2020     (H) 02/04/2020    WBC 8.3 02/04/2020    NEUTROABS 6.5 02/04/2020    LYMPHSABS 1.0 02/04/2020    MONOSABS 0.5 02/04/2020    EOSABS 0.3 02/04/2020    BASOSABS 0.0 02/04/2020       Lab Results   Component Value Date    GLUCOSE 143 (H) 11/14/2019    BUN 13 02/04/2020    CREATININE 0.65 02/04/2020     02/04/2020    K 4.0 02/04/2020     02/04/2020    CO2 23 02/04/2020 "    CALCIUM 8.8 02/04/2020    PROTEINTOT 7.2 11/14/2019    ALBUMIN 3.7 (L) 02/04/2020    BILITOT <0.2 02/04/2020    ALKPHOS 116 02/04/2020    AST 22 02/04/2020    ALT 23 02/04/2020                   ASSESSMENT & PLAN:    1. Stage IIIB [id number] moderately differentiated colon cancer  2.   History of breast cancer 2003 treated with chemotherapy and radiation with Dr. Rivers  3.   History of cervical cancer treated with radiation in 1993    -3/25/2019 screening colonoscopy found 5.5 cm tumor at 50 cm near the splenic fracture which was tattooed.  This showed moderately differentiated adenocarcinoma and there was a separate hyperplastic polyp 5 mm at 15 cm.  -3/27/2019 CT chest abdomen pelvis showed tumor at the splenic flexure T3 with transmural invasion and no distant metastases.  Positive gallstones and hiatal hernia.  CEA 2.9 with normal CMP.  -4/3/2019 transverse colectomy to the splenic flexure with primary re-anastomosis revealed moderately differentiated adenocarcinoma extending to the serosa/mesenteric fat with 8 out of 12 lymph nodes involved the largest being 2 cm.  Margins negative.  T3N2B stage IIIb  -4/30/2019 initial medical oncology visit: I reviewed her history.  She has 3 first-degree relatives with breast cancer, thyroid cancer in her family, prostate cancer in her first-degree relatives, and has had 2 other malignancies prior to her colon cancer as outlined above.  I have spoken with Dr. Freddy Esquivel to get mismatch repair testing.  She has seen Dr. Adam Royal who, because of the extent of this next to the spleen feels that localized radiation may be reasonable while not standard.  -5/14/2019 office visit: PCR showed she was mismatch repair proficient; therefore, consider Xeloda radiation followed by 6 months of Xeloda oxaliplatin.  Coordinated this with Dr. Royal who will start radiation May 27.  Following that we will start Xeloda oxaliplatin.  She will take 3 tablets of Xeloda twice a day  Monday through Friday with radiation.  She will get a chemotherapy education appointment with our pharmacy doctorate's in [place].  Though mismatch repair proficient, we will still have her see genetic counseling    -6/5/2019 genetic counseling negative    -Began radiation along with Xeloda 6/4/19 and completed 7/15/19    -7/22/2019 CT chest abdomen and pelvis showed progression of disease with extensive peritoneal carcinomatosis    -7/30/2019 office visit: I reviewed the results and images of her CT chest abdomen pelvis showing peritoneal carcinomatosis.  July 9 CEA elevated at 8.4.  Discussed with patient the plan to continue with the Xeloda oxaliplatin but to add Avastin.  Spoken with surgical oncologist at [facility] during a meeting who performs HIPEC.  He suggested primary chemotherapy and if get a response then send to surgeon accordingly.  I plan to give her Avastin Xeloda oxaliplatin and send strata as this is acting like a BRAF mutated cancer.  If that turns out to be the case, I will switch her to cetuximab, Encorafenib, and Binimetinib.  If we get a peritoneal response without distant metastatic visceral disease of the liver or lung appearing, then I will get her to Dr. Sims at the [facility] for consideration of peritoneal debulking with PCI scoring and possible HI PEC.  She understands the palliative nature of our attempts at this junction.    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  Microsatellite stable.  TMB low.  PD-L1 low.  Potential relevant therapies include Binimetinib + Encorafenib, see Strata report for full list.     -10/28/2019CT chest abdomen pelvis shows previous granulomatous disease with some emphysematous changes, small left pleural effusion with atelectasis or pneumonitis with decrease in size compared to prior CT.  1.3 cm hepatic dome lesion likely related to serosal implant with mild perihepatic ascites.  Has perisplenic  carcinomatosis with additional peritoneal carcinomatosis.  Compared to July the peritoneal carcinomatosis has progressed diffusely including down into the deep pelvis with numerous implants demonstrated.    -10/29/2019 [facility] oncology follow-up visit: I reviewed the images and reports of the CT scan above compared to July where there is clear-cut progression.  CEA had come down from 14.5 at the peak end of August to 4.9 as of 10/9/2019, clearly there is peritoneal progression on CT and she has poorly tolerated the Xeloda oxaliplatin Avastin.  Hence we will, based on that B BRAF V600 E mutation, place her on cetuximab, Binimetinib, and Encorafenib which is the most active regimen for this particular mutation and colorectal cancers.  We will check her ejection fraction and have her see my nurse practitioner back in a week to make sure that is doing well and start her into on this regimen and will have my pharmacy staff educate her on this.  Went over the side effects including rashes and diarrhea.  She is currently having diarrhea from the Xeloda and I will is on Lomotil and Imodium which hopefully will subside over the next week.    -11/1/2019 echocardiogram with normal left ventricular systolic function calculated EF 59%.    -1/31/2020 CT chest abdomen pelvis with contrast at [place] [facility] shows multiple new pleural-based densities left lung concerning for pleural metastatic disease as well as increasing size of subcentimeter left lower lobe nodule.  Mild progression of carcinomatosis predominantly in the omentum compared to 10/24/1999 with a new mild amount of abnormal soft tissue density in the gastrohepatic ligament.  Hemoglobin 10 with platelets 554,000 normal white count.  CMP unremarkable.  Having increasing abdominal pains.  We will switch to Avastin FOLFIRI.  For her pain I am E prescribing Norco 5/325 1-2 every 4-6 hours as needed #150.  I will also get nutrition referral.    Discussed with  patient face-to-face 30 minutes greater than 50% spent counseling regarding this plan.      Zhang Menchaca MD    02/10/2020

## 2020-02-13 NOTE — PROGRESS NOTES
Onc Nutrition     Patient:  Naz Singh  YOB: 1955    Diagnosis: metastatic colon cancer  Treatment:  FOLFIRI + Avastin - every 14 days     Weight: 144#  Nutrition Symptoms: nausea, vomiting, and constipation     Spoke with patient via phone call for nutrition follow up.  Note patient will be starting on a new treatment plan due to progressive disease.  She states her oral intake has been better this week.  She also reports her nutrition impact symptoms have been better managed this week.  However she does mention it has been several days since she has a bowel movement - she reports having Miralax at home and will take a dose today.      Reviewed the importance of good nutrition during her treatment course.  Advised her to be eating smaller more frequent meals/snacks throughout the day to aid with nausea management.  Suggested she avoid spicy/highly seasoned and fried/fatty/greasy foods to also aid with nausea management.  Emphasized the importance of protein and reviewed high protein foods.  Offered several snack ideas she may find more appealing at this time.  She reports drinking ~1 quart of water daily.  Encouraged her to increase her water consumption with a goal of 2 quarts daily fo adequate hydration.    Answered her questions and she voiced understanding of information discussed.  Will mail written diet materials to reinforce information discussed.  Encouraged her to call RD with questions.  Will monitor as needed during her treatment course.    Risa Cuevas RD  02/13/20

## 2020-02-14 NOTE — PROGRESS NOTES
CHEMOTHERAPY PREPARATION    Naz Singh  [id number]  1955    Chief Complaint: Chemotherapy preparation    History of present illness:  Naz Singh is a 64 y.o. year old female who is here today for chemotherapy preparation and needs assessment. The patient has been diagnosed with metastatic colon cancer with peritoneal carcinomatosis and is scheduled to begin treatment with FOLFIRI plus Mvasi.     Oncology History:    Oncology/Hematology History    1.  Stage IIIB [id number] moderately differentiated colon cancer  2.   History of breast cancer 2003 treated with chemotherapy and radiation with Dr. Rivers  3.   History of cervical cancer treated with radiation in 1993    -3/25/2019 screening colonoscopy found 5.5 cm tumor at 50 cm near the splenic fracture which was tattooed.  This showed moderately differentiated adenocarcinoma and there was a separate hyperplastic polyp 5 mm at 15 cm.  -3/27/2019 CT chest abdomen pelvis showed tumor at the splenic flexure T3 with transmural invasion and no distant metastases.  Positive gallstones and hiatal hernia.  CEA 2.9 with normal CMP.  -4/3/2019 transverse colectomy to the splenic flexure with primary re-anastomosis revealed moderately differentiated adenocarcinoma extending to the serosa/mesenteric fat with 8 out of 12 lymph nodes involved the largest being 2 cm.  Margins negative.  T3N2B stage IIIb  -4/30/2019 initial medical oncology visit: I reviewed her history.  She has 3 first-degree relatives with breast cancer, thyroid cancer in her family, prostate cancer in her first-degree relatives, and has had 2 other malignancies prior to her colon cancer as outlined above.  I have spoken with Dr. Freddy Esquivel to get mismatch repair testing.  She has seen Dr. Adam Royal who, because of the extent of this next to the spleen feels that localized radiation may be reasonable while not standard.  -5/14/2019 office visit: PCR showed she was mismatch repair proficient;  therefore, consider Xeloda radiation followed by 6 months of Xeloda oxaliplatin.  Coordinated this with Dr. Royal who will start radiation May 27.  Following that we will start Xeloda oxaliplatin.  She will take 3 tablets of Xeloda twice a day Monday through Friday with radiation.  She will get a chemotherapy education appointment with our pharmacy doctorate's in [place].  Though mismatch repair proficient, we will still have her see genetic counseling    -6/5/2019 genetic counseling negative    -Began radiation along with Xeloda 6/4/19 and completed 7/15/19    -7/22/2019 CT chest abdomen and pelvis showed progression of disease with extensive peritoneal carcinomatosis    -7/30/2019 office visit: I reviewed the results and images of her CT chest abdomen pelvis showing peritoneal carcinomatosis.  July 9 CEA elevated at 8.4.  Discussed with patient the plan to continue with the Xeloda oxaliplatin but to add Avastin.  Spoken with surgical oncologist at [facility] during a meeting who performs HIPEC.  He suggested primary chemotherapy and if get a response then send to surgeon accordingly.  I plan to give her Avastin Xeloda oxaliplatin and send strata as this is acting like a BRAF mutated cancer.  If that turns out to be the case, I will switch her to cetuximab, Encorafenib, and Binimetinib.  If we get a peritoneal response without distant metastatic visceral disease of the liver or lung appearing, then I will get her to Dr. Sims at the [facility] for consideration of peritoneal debulking with PCI scoring and possible HI PEC.  She understands the palliative nature of our attempts at this junction.    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  Microsatellite stable.  TMB low.  PD-L1 low.  Potential relevant therapies include Binimetinib + Encorafenib, see Strata report for full list.     -10/28/2019CT chest abdomen pelvis shows previous granulomatous disease  with some emphysematous changes, small left pleural effusion with atelectasis or pneumonitis with decrease in size compared to prior CT.  1.3 cm hepatic dome lesion likely related to serosal implant with mild perihepatic ascites.  Has perisplenic carcinomatosis with additional peritoneal carcinomatosis.  Compared to July the peritoneal carcinomatosis has progressed diffusely including down into the deep pelvis with numerous implants demonstrated.    -10/29/2019 [facility] oncology follow-up visit: I reviewed the images and reports of the CT scan above compared to July where there is clear-cut progression.  CEA had come down from 14.5 at the peak end of August to 4.9 as of 10/9/2019, clearly there is peritoneal progression on CT and she has poorly tolerated the Xeloda oxaliplatin Avastin.  Hence we will, based on that B BRAF V600 E mutation, place her on cetuximab, Binimetinib, and Encorafenib which is the most active regimen for this particular mutation and colorectal cancers.  We will check her ejection fraction and have her see my nurse practitioner back in a week to make sure that is doing well and start her into on this regimen and will have my pharmacy staff educate her on this.  Went over the side effects including rashes and diarrhea.  She is currently having diarrhea from the Xeloda and I will is on Lomotil and Imodium which hopefully will subside over the next week.    -11/1/2019 echocardiogram with normal left ventricular systolic function calculated EF 59%.    -1/31/2020 CT chest abdomen pelvis with contrast at [facility] shows multiple new pleural-based densities left lung concerning for pleural metastatic disease as well as increasing size of subcentimeter left lower lobe nodule.  Mild progression of carcinomatosis predominantly in the omentum compared to 10/24/1999 with a new mild amount of abnormal soft tissue density in the gastrohepatic ligament.  Hemoglobin 10 with platelets 554,000  normal white count.  CMP unremarkable.  Having increasing abdominal pains.  We will switch to Avastin FOLFIRI.             Overlapping malignant neoplasm of colon (CMS/HCC)    4/30/2019 Initial Diagnosis     Overlapping malignant neoplasm of colon (CMS/HCC)      6/4/2019 - 7/9/2019 Chemotherapy/Radiation     OP COLON Capecitabine 825 mg/m2 M-F            6/5/2019 Genetic Testing      Genetic testing was negative for mutations in BRCA1/2 and all 32 additional genes on the CancerNext Panel.           7/22/2019 Progression     CT chest abdomen and pelvis revealed rather extensive diffuse peritoneal carcinomatosis, at least 15-20 peritoneal nodules measuring between 1 cm in many as large as 2-3 cm scattered throughout the peritoneal cavity, significantly progressed from earlier this year.  No metastasis in the chest.  Small left pleural effusion with associated mild left bibasilar atelectasis, otherwise clear lungs.      8/8/2019 - 10/21/2019 Chemotherapy     OP COLON CapeOX+ Bevacizumab (Capecitabine / OXALIplatin / Bevacizumab)        10/28/2019 Progression     Compared to July the peritoneal carcinomatosis has progressed diffusely including down into the deep pelvis with numerous implants demonstrated.        10/28/2019 Imaging     CT chest abdomen pelvis shows previous granulomatous disease with some emphysematous changes, small left pleural effusion with atelectasis or pneumonitis with decrease in size compared to prior CT.  1.3 cm hepatic dome lesion likely related to serosal implant with mild perihepatic ascites.  Has perisplenic carcinomatosis with additional peritoneal carcinomatosis.  Compared to July the peritoneal carcinomatosis has progressed diffusely including down into the deep pelvis with numerous implants demonstrated.      11/1/2019 -  Other Event     Echocardiogram  · Left ventricular systolic function is normal.  · Calculated EF = 59.0%. Normal Global Longitudinal LV strain = -20.5%.  · Left  "ventricular diastolic dysfunction (grade II) consistent with pseudonormalization.  · Left atrial cavity size is mildly dilated.      11/14/2019 - 2/5/2020 Chemotherapy     OP COLORECTAL Cetuximab / Binimetinib / Encorafenib         12/11/2019 -  Other Event     Echocardiogram  · Left ventricular systolic function is normal.  · Calculated EF = 72.0%  · Normal global longitudinal strain.      1/31/2020 Progression     CT chest abdomen pelvis shows new pleural metastases, increasing left lower lobe lung nodule, increasing carcinomatosis, and a new gastrohepatic ligament lymph node.        2/18/2020 -  Chemotherapy     OP COLORECTAL FOLFIRI + Bevacizumab-awwb (Irinotecan / Leucovorin / Fluorouracil / Bevacizumab)         History reviewed. No pertinent past medical history.    Past Surgical History:   Procedure Laterality Date   • BREAST LUMPECTOMY Left 2003    [facility]   • COLON SURGERY  2019    [facility] Dr. Hammer       MEDICATIONS: The current medication list was reviewed and reconciled.     Allergies:  is allergic to penicillins.    Family History   Problem Relation Age of Onset   • Breast cancer Mother    • Heart disease Father    • Breast cancer Sister    • Prostate cancer Brother    • Colon cancer Brother    • Breast cancer Sister          Review of Systems   Constitutional: Positive for fatigue (Mild fatigue).   HENT: Negative.    Eyes: Negative.    Respiratory: Negative.    Cardiovascular: Negative.    Gastrointestinal: Positive for abdominal pain and constipation.   Endocrine: Negative.    Genitourinary: Negative.    Musculoskeletal: Positive for back pain.   Skin: Negative.    Allergic/Immunologic: Negative.    Neurological: Negative.    Hematological: Negative.    Psychiatric/Behavioral: Negative.        Physical Exam  Vital Signs: /70   Pulse 70   Temp 97.6 °F (36.4 °C)   Resp 14   Ht 167.6 cm (66\")   Wt 65.8 kg (145 lb)   SpO2 99%   BMI 23.40 kg/m²    General Appearance:  alert, cooperative, " no apparent distress and appears stated age, petite framed.   Neurologic/Psychiatric: A&O x 3, gait steady, appropriate affect   HEENT:  Normocephalic, without obvious abnormality, mucous membranes moist   Lungs:   Clear to auscultation bilaterally; respirations regular, even, and unlabored bilaterally   Heart:  Regular rate and rhythm, no murmurs appreciated   Extremities: Normal, atraumatic; no clubbing, cyanosis, or edema    Skin: No rashes, lesions, or abnormal coloration noted     ECOG Performance Status: (1) Restricted in Physically Strenuous Activity, Ambulatory & Able to Do Work of Light Nature          NEEDS ASSESSMENTS completed on previous             CHEMOTHERAPY EDUCATION    Booklets Given: Chemotherapy and You [x]  Eating Hints [x]    Sexuality/Fertility Books []      Chemotherapy/Biotherapy Education Sheets: (list all that apply)  nausea management, acid reflux management, diarrhea management, Cancer resourse contacts information, skin and mouth care and vaccination information                                                                                                                                                                 Chemotherapy Regimen:   Treatment Plans     Name Type Plan dates Plan Provider         Active    OP COLORECTAL FOLFIRI + Bevacizumab-awwb (Irinotecan / Leucovorin / Fluorouracil / Bevacizumab) ONCOLOGY TREATMENT  2/17/2020 - Present Zhang Menchaca MD                    TOPICS EDUCATION PROVIDED COMMENTS   ANEMIA:  role of RBC, cause, s/s, ways to manage, role of transfusion [x]    THROMBOCYTOPENIA:  role of platelet, cause, s/s, ways to prevent bleeding, things to avoid, when to seek help [x]    NEUTROPENIA:  role of WBC, cause, infection precautions, s/s of infection, when to call MD [x]    NUTRITION & APPETITE CHANGES:  importance of maintaining healthy diet & weight, ways to manage to improve intake, dietary consult, exercise regimen [x]    DIARRHEA:  causes, s/s of  dehydration, ways to manage, dietary changes, when to call MD [x]    CONSTIPATION:  causes, ways to manage, dietary changes, when to call MD [x]    NAUSEA & VOMITING:  cause, use of antiemetics, dietary changes, when to call MD [x]    MOUTH SORES:  causes, oral care, ways to manage [x]    ALOPECIA:  cause, ways to manage, resources [x]    INFERTILITY & SEXUALITY:  causes, fertility preservation options, sexuality changes, ways to manage, importance of birth control []    NERVOUS SYSTEM CHANGES:  causes, s/s, neuropathies, cognitive changes, ways to manage [x]    PAIN:  causes, ways to manage [x] ????   SKIN & NAIL CHANGES:  cause, s/s, ways to manage [x]    ORGAN TOXICITIES:  cause, s/s, need for diagnostic tests, labs, when to notify MD [x]    HOME CARE:  use of spill kits, storing of PO chemo, how to manage bodily fluids [x]    MISCELLANEOUS:  drug interactions, administration, vesicant, et [x]        Assessment and Plan:    Diagnoses and all orders for this visit:    Overlapping malignant neoplasm of colon (CMS/HCC)  -     Provider communication  -     traMADol (ULTRAM) 50 MG tablet; 1/2 to 1 tablet by mouth every 6 hours as needed for pain    Cancer associated pain  -     traMADol (ULTRAM) 50 MG tablet; 1/2 to 1 tablet by mouth every 6 hours as needed for pain        This was a 30 minute face-to-face visit with 25 minutes spent in  counseling and coordination of care as documented above.   The patient and I have reviewed their new cancer diagnosis and scheduled treatment plan. Needs assessment was completed including genetics, psychosocial needs, barriers to care, VAD evaluation, advanced care planning, and palliative care services. Referrals have been ordered as appropriate based upon our evaluation and patient desires.     Chemotherapy teaching was also completed today as documented above. Adequate time was given to answer all questions to her satisfaction. Patient and family are aware of their care team  members and contact information if they have questions or problems throughout the treatment course. Needs assessments and education has been completed. The patient is adequately prepared to begin treatment as scheduled.     The patient states that she did not like the way hydrocodone made her feel.  She takes ibuprofen and seems to get fairly good relief from that for her abdominal and upper back pain.  I have sent in a prescription for tramadol 50 mg, she can take 1/2 to 1 tablet every 6 hours as needed for pain.  She will let us know if this is not effective.  We will see her back in our office Tuesday for her first cycle.  We will plan on repeating scans after about 3 months of therapy as long as it is tolerated.    Itzel Curran, APRN  02/14/2020

## 2020-03-03 NOTE — PROGRESS NOTES
CHIEF COMPLAINT: Follow-up progressive metastatic colon cancer    Problem List:  Oncology/Hematology History    1.  Stage IIIB E1H4HC5 moderately differentiated colon cancer  2.   History of breast cancer 2003 treated with chemotherapy and radiation with Dr. Rivers  3.   History of cervical cancer treated with radiation in 1993    -3/25/2019 screening colonoscopy found 5.5 cm tumor at 50 cm near the splenic fracture which was tattooed.  This showed moderately differentiated adenocarcinoma and there was a separate hyperplastic polyp 5 mm at 15 cm.  -3/27/2019 CT chest abdomen pelvis showed tumor at the splenic flexure T3 with transmural invasion and no distant metastases.  Positive gallstones and hiatal hernia.  CEA 2.9 with normal CMP.  -4/3/2019 transverse colectomy to the splenic flexure with primary re-anastomosis revealed moderately differentiated adenocarcinoma extending to the serosa/mesenteric fat with 8 out of 12 lymph nodes involved the largest being 2 cm.  Margins negative.  T3N2B stage IIIb  -4/30/2019 initial medical oncology visit: I reviewed her history.  She has 3 first-degree relatives with breast cancer, thyroid cancer in her family, prostate cancer in her first-degree relatives, and has had 2 other malignancies prior to her colon cancer as outlined above.  I have spoken with Dr. Freddy Esquivel to get mismatch repair testing.  She has seen Dr. Adam Royal who, because of the extent of this next to the spleen feels that localized radiation may be reasonable while not standard.  -5/14/2019 office visit: PCR showed she was mismatch repair proficient; therefore, consider Xeloda radiation followed by 6 months of Xeloda oxaliplatin.  Coordinated this with Dr. Royal who will start radiation May 27.  Following that we will start Xeloda oxaliplatin.  She will take 3 tablets of Xeloda twice a day Monday through Friday with radiation.  She will get a chemotherapy education appointment with our pharmacy  doctorate's in [place].  Though mismatch repair proficient, we will still have her see genetic counseling    -6/5/2019 genetic counseling negative    -Began radiation along with Xeloda 6/4/19 and completed 7/15/19    -7/22/2019 CT chest abdomen and pelvis showed progression of disease with extensive peritoneal carcinomatosis    -7/30/2019 office visit: I reviewed the results and images of her CT chest abdomen pelvis showing peritoneal carcinomatosis.  July 9 CEA elevated at 8.4.  Discussed with patient the plan to continue with the Xeloda oxaliplatin but to add Avastin.  Spoken with surgical oncologist at [facility] during a meeting who performs HIPEC.  He suggested primary chemotherapy and if get a response then send to surgeon accordingly.  I plan to give her Avastin Xeloda oxaliplatin and send strata as this is acting like a BRAF mutated cancer.  If that turns out to be the case, I will switch her to cetuximab, Encorafenib, and Binimetinib.  If we get a peritoneal response without distant metastatic visceral disease of the liver or lung appearing, then I will get her to Dr. Sims at the [facility] for consideration of peritoneal debulking with PCI scoring and possible HI PEC.  She understands the palliative nature of our attempts at this junction.    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  Microsatellite stable.  TMB low.  PD-L1 low.  Potential relevant therapies include Binimetinib + Encorafenib, see Strata report for full list.     -10/28/2019CT chest abdomen pelvis shows previous granulomatous disease with some emphysematous changes, small left pleural effusion with atelectasis or pneumonitis with decrease in size compared to prior CT.  1.3 cm hepatic dome lesion likely related to serosal implant with mild perihepatic ascites.  Has perisplenic carcinomatosis with additional peritoneal carcinomatosis.  Compared to July the peritoneal carcinomatosis  has progressed diffusely including down into the deep pelvis with numerous implants demonstrated.    -10/29/2019 [facility] medical oncology follow-up visit: I reviewed the images and reports of the CT scan above compared to July where there is clear-cut progression.  CEA had come down from 14.5 at the peak end of August to 4.9 as of 10/9/2019, clearly there is peritoneal progression on CT and she has poorly tolerated the Xeloda oxaliplatin Avastin.  Hence we will, based on that B BRAF V600 E mutation, place her on cetuximab, Binimetinib, and Encorafenib which is the most active regimen for this particular mutation and colorectal cancers.  We will check her ejection fraction and have her see my nurse practitioner back in a week to make sure that is doing well and start her into on this regimen and will have my pharmacy staff educate her on this.  Went over the side effects including rashes and diarrhea.  She is currently having diarrhea from the Xeloda and I will is on Lomotil and Imodium which hopefully will subside over the next week.    -11/1/2019 echocardiogram with normal left ventricular systolic function calculated EF 59%.    -1/31/2020 CT chest abdomen pelvis with contrast at [facility] shows multiple new pleural-based densities left lung concerning for pleural metastatic disease as well as increasing size of subcentimeter left lower lobe nodule.  Mild progression of carcinomatosis predominantly in the omentum compared to 10/24/1999 with a new mild amount of abnormal soft tissue density in the gastrohepatic ligament.  Hemoglobin 10 with platelets 554,000 normal white count.  CMP unremarkable.  Having increasing abdominal pains.  We will switch to Avastin FOLFIRI.             Overlapping malignant neoplasm of colon (CMS/HCC)    4/30/2019 Initial Diagnosis     Overlapping malignant neoplasm of colon (CMS/HCC)      6/4/2019 - 7/9/2019 Chemotherapy/Radiation     OP COLON Capecitabine 825 mg/m2 M-F             6/5/2019 Genetic Testing      Genetic testing was negative for mutations in BRCA1/2 and all 32 additional genes on the CancerNext Panel.           7/22/2019 Progression     CT chest abdomen and pelvis revealed rather extensive diffuse peritoneal carcinomatosis, at least 15-20 peritoneal nodules measuring between 1 cm in many as large as 2-3 cm scattered throughout the peritoneal cavity, significantly progressed from earlier this year.  No metastasis in the chest.  Small left pleural effusion with associated mild left bibasilar atelectasis, otherwise clear lungs.      8/8/2019 - 10/21/2019 Chemotherapy     OP COLON CapeOX+ Bevacizumab (Capecitabine / OXALIplatin / Bevacizumab)        10/28/2019 Progression     Compared to July the peritoneal carcinomatosis has progressed diffusely including down into the deep pelvis with numerous implants demonstrated.        10/28/2019 Imaging     CT chest abdomen pelvis shows previous granulomatous disease with some emphysematous changes, small left pleural effusion with atelectasis or pneumonitis with decrease in size compared to prior CT.  1.3 cm hepatic dome lesion likely related to serosal implant with mild perihepatic ascites.  Has perisplenic carcinomatosis with additional peritoneal carcinomatosis.  Compared to July the peritoneal carcinomatosis has progressed diffusely including down into the deep pelvis with numerous implants demonstrated.      11/1/2019 -  Other Event     Echocardiogram  · Left ventricular systolic function is normal.  · Calculated EF = 59.0%. Normal Global Longitudinal LV strain = -20.5%.  · Left ventricular diastolic dysfunction (grade II) consistent with pseudonormalization.  · Left atrial cavity size is mildly dilated.      11/14/2019 - 2/5/2020 Chemotherapy     OP COLORECTAL Cetuximab / Binimetinib / Encorafenib         12/11/2019 -  Other Event     Echocardiogram  · Left ventricular systolic function is normal.  · Calculated EF = 72.0%  · Normal  global longitudinal strain.      1/31/2020 Progression     CT chest abdomen pelvis shows new pleural metastases, increasing left lower lobe lung nodule, increasing carcinomatosis, and a new gastrohepatic ligament lymph node.        2/18/2020 -  Chemotherapy     OP COLORECTAL FOLFIRI + Bevacizumab-awwb (Irinotecan / Leucovorin / Fluorouracil / Bevacizumab)         HISTORY OF PRESENT ILLNESS:  The patient is a 64 y.o. female, here for follow up on management of metastatic colon cancer.  Now on Avastin FOLFIRI due for course #2.  Still had some nausea following this course #1 but only took the antiemetic once a day.  Did not like the way her narcotics made her feel and her pain is being adequately controlled with ibuprofen per the patient's recognizance      History reviewed. No pertinent past medical history.  Past Surgical History:   Procedure Laterality Date   • BREAST LUMPECTOMY Left 2003    [facility]   • [place]ON [place]GERY  [place]    [facility] [place]       Allergies   Allergen Reactions   • Penicillins Nausea And Vomiting       Family History and Social History reviewed and changed as necessary      REVIEW OF SYSTEM:   Review of Systems   Constitutional: Negative for appetite change, chills, diaphoresis, fatigue, fever and unexpected weight change.   HENT:   Negative for mouth sores, sore throat and trouble swallowing.    Eyes: Negative for icterus.   Respiratory: Negative for cough, hemoptysis and shortness of breath.    Cardiovascular: Negative for chest pain, leg swelling and palpitations.   Gastrointestinal: Negative for abdominal distention, abdominal pain, blood in stool, constipation, diarrhea, nausea and vomiting.   Endocrine: Negative for hot flashes.   Genitourinary: Negative for bladder incontinence, difficulty urinating, dysuria, frequency and hematuria.    Musculoskeletal: Negative for gait problem, neck pain and neck stiffness.   Skin: Negative for rash.   Neurological: Negative for dizziness, gait  "problem, headaches, light-headedness and numbness.   Hematological: Negative for adenopathy. Does not bruise/bleed easily.   Psychiatric/Behavioral: Negative for depression. The patient is not nervous/anxious.    All other systems reviewed and are negative.       PHYSICAL EXAM    Vitals:    03/03/20 0922   BP: 148/69   Pulse: 90   Resp: 16   Temp: 98.5 °F (36.9 °C)   SpO2: 96%   Weight: 64 kg (141 lb)   Height: 167.6 cm (66\")     Constitutional: Appears well-developed and well-nourished. No distress.   ECOG: (1) Restricted in Physically Strenuous Activity, Ambulatory & Able to Do Work of Light Nature  HENT:   Head: Normocephalic.   Mouth/Throat: Oropharynx is clear and moist.   Eyes: Conjunctivae are normal. Pupils are equal, round, and reactive to light. No scleral icterus.   Neck: Neck supple. No JVD present. No thyromegaly present.   Cardiovascular: Normal rate, regular rhythm and normal heart sounds.    Pulmonary/Chest: Breath sounds normal. No respiratory distress.   Abdominal: Soft. Exhibits no distension and no mass. There is no hepatosplenomegaly. There is no tenderness. There is no rebound and no guarding.   Musculoskeletal:Exhibits no edema, tenderness or deformity.   Neurological: Alert and oriented to person, place, and time. Exhibits normal muscle tone.   Skin: No ecchymosis, no petechiae and no rash noted. Not diaphoretic. No cyanosis. Nails show no clubbing.   Psychiatric: Normal mood and affect.   Vitals reviewed.      Lab Results   Component Value Date    HGB 9.0 (L) 02/27/2020    HCT 28.5 (L) 02/27/2020    MCV 82 02/27/2020     (H) 02/27/2020    WBC 3.9 02/27/2020    NEUTROABS 2.4 02/27/2020    LYMPHSABS 0.7 02/27/2020    MONOSABS 0.3 02/27/2020    EOSABS 0.5 (H) 02/27/2020    BASOSABS 0.0 02/27/2020       Lab Results   Component Value Date    GLUCOSE 100 (H) 02/14/2020    BUN 12 02/27/2020    CREATININE 0.54 (L) 02/27/2020     02/27/2020    K 3.9 02/27/2020     02/27/2020    CO2 " 24 02/27/2020    CALCIUM 9.0 02/27/2020    PROTEINTOT 7.0 02/14/2020    ALBUMIN 3.6 (L) 02/27/2020    BILITOT <0.2 02/27/2020    ALKPHOS 232 (H) 02/27/2020    AST 48 (H) 02/27/2020    ALT 77 (H) 02/27/2020                   ASSESSMENT & PLAN:    1. Stage IIIB [id number] moderately differentiated colon cancer  2.   History of breast cancer 2003 treated with chemotherapy and radiation with Dr. Rivers  3.   History of cervical cancer treated with radiation in 1993  4.   elevated liver enzymes     Discussion: We will press on with Avastin FOLFIRI every 2 weeks and rescan herEarly April.  She will redouble her efforts on her antiemetics and she will take her ibuprofen for pain which seems to be doing the trick.  We will watch her mild elevation of liver enzymes.  We will see her back in 2 weeks for continued Chemotherapy.    Zhang Menchaca MD    03/03/2020

## 2020-03-11 NOTE — TELEPHONE ENCOUNTER
Patient left  on triage line regarding on and off again nose bleeds. Patient states there is small traces of blood when she blows her nose. Assures no continuous flow nose bleeds. Discussed with Milagros MEDEROS. Advised patient this is a side effect of the chemo and she can try OTC saline nasal spray to help alleviate the dryness/irritation. Patient verbalized understanding.

## 2020-03-17 NOTE — PROGRESS NOTES
At the end of the infusion of the Irinotecan pt c/o's of dizziness.  Infusion stopped.  Notified CHICA MEDEROS.  Pt up to the bathroom.  After sitting back down patient states she feels better.  Proceeded with the 5FU push and bulb hook up and instructed pt to call if any problems.

## 2020-03-17 NOTE — PROGRESS NOTES
CHIEF COMPLAINT: Follow-up progressive metastatic colon cancer    Problem List:  Oncology/Hematology History    1.  Stage IIIB [id number] moderately differentiated colon cancer  2.   History of breast cancer 2003 treated with chemotherapy and radiation with Dr. Rivers  3.   History of cervical cancer treated with radiation in 1993    -3/25/2019 screening colonoscopy found 5.5 cm tumor at 50 cm near the splenic fracture which was tattooed.  This showed moderately differentiated adenocarcinoma and there was a separate hyperplastic polyp 5 mm at 15 cm.  -3/27/2019 CT chest abdomen pelvis showed tumor at the splenic flexure T3 with transmural invasion and no distant metastases.  Positive gallstones and hiatal hernia.  CEA 2.9 with normal CMP.  -4/3/2019 transverse colectomy to the splenic flexure with primary re-anastomosis revealed moderately differentiated adenocarcinoma extending to the serosa/mesenteric fat with 8 out of 12 lymph nodes involved the largest being 2 cm.  Margins negative.  T3N2B stage IIIb  -4/30/2019 initial medical oncology visit: I reviewed her history.  She has 3 first-degree relatives with breast cancer, thyroid cancer in her family, prostate cancer in her first-degree relatives, and has had 2 other malignancies prior to her colon cancer as outlined above.  I have spoken with Dr. Freddy Esquivel to get mismatch repair testing.  She has seen Dr. Adam Royal who, because of the extent of this next to the spleen feels that localized radiation may be reasonable while not standard.  -5/14/2019 office visit: PCR showed she was mismatch repair proficient; therefore, consider Xeloda radiation followed by 6 months of Xeloda oxaliplatin.  Coordinated this with Dr. Royal who will start radiation May 27.  Following that we will start Xeloda oxaliplatin.  She will take 3 tablets of Xeloda twice a day Monday through Friday with radiation.  She will get a chemotherapy education appointment with our pharmacy  doctorate's in [place].  Though mismatch repair proficient, we will still have her see genetic counseling    -6/5/2019 genetic counseling negative    -Began radiation along with Xeloda 6/4/19 and completed 7/15/19    -7/22/2019 CT chest abdomen and pelvis showed progression of disease with extensive peritoneal carcinomatosis    -7/30/2019 office visit: I reviewed the results and images of her CT chest abdomen pelvis showing peritoneal carcinomatosis.  July 9 CEA elevated at 8.4.  Discussed with patient the plan to continue with the Xeloda oxaliplatin but to add Avastin.  Spoken with surgical oncologist at [facility] during a meeting who performs HIPEC.  He suggested primary chemotherapy and if get a response then send to surgeon accordingly.  I plan to give her Avastin Xeloda oxaliplatin and send strata as this is acting like a BRAF mutated cancer.  If that turns out to be the case, I will switch her to cetuximab, Encorafenib, and Binimetinib.  If we get a peritoneal response without distant metastatic visceral disease of the liver or lung appearing, then I will get her to Dr. Sims at the [facility] for consideration of peritoneal debulking with PCI scoring and possible HI PEC.  She understands the palliative nature of our attempts at this junction.    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  Microsatellite stable.  TMB low.  PD-L1 low.  Potential relevant therapies include Binimetinib + Encorafenib, see Strata report for full list.     -10/28/2019CT chest abdomen pelvis shows previous granulomatous disease with some emphysematous changes, small left pleural effusion with atelectasis or pneumonitis with decrease in size compared to prior CT.  1.3 cm hepatic dome lesion likely related to serosal implant with mild perihepatic ascites.  Has perisplenic carcinomatosis with additional peritoneal carcinomatosis.  Compared to July the peritoneal carcinomatosis  has progressed diffusely including down into the deep pelvis with numerous implants demonstrated.    -10/29/2019 [facility] medical oncology follow-up visit: I reviewed the images and reports of the CT scan above compared to July where there is clear-cut progression.  CEA had come down from 14.5 at the peak end of August to 4.9 as of 10/9/2019, clearly there is peritoneal progression on CT and she has poorly tolerated the Xeloda oxaliplatin Avastin.  Hence we will, based on that B BRAF V600 E mutation, place her on cetuximab, Binimetinib, and Encorafenib which is the most active regimen for this particular mutation and colorectal cancers.  We will check her ejection fraction and have her see my nurse practitioner back in a week to make sure that is doing well and start her into on this regimen and will have my pharmacy staff educate her on this.  Went over the side effects including rashes and diarrhea.  She is currently having diarrhea from the Xeloda and I will is on Lomotil and Imodium which hopefully will subside over the next week.    -11/1/2019 echocardiogram with normal left ventricular systolic function calculated EF 59%.    -1/31/2020 CT chest abdomen pelvis with contrast at [facility] shows multiple new pleural-based densities left lung concerning for pleural metastatic disease as well as increasing size of subcentimeter left lower lobe nodule.  Mild progression of carcinomatosis predominantly in the omentum compared to 10/24/1999 with a new mild amount of abnormal soft tissue density in the gastrohepatic ligament.  Hemoglobin 10 with platelets 554,000 normal white count.  CMP unremarkable.  Having increasing abdominal pains.  We will switch to Avastin FOLFIRI.             Overlapping malignant neoplasm of colon (CMS/HCC)    4/30/2019 Initial Diagnosis     Overlapping malignant neoplasm of colon (CMS/HCC)      6/4/2019 - 7/9/2019 Chemotherapy/Radiation     OP COLON Capecitabine 825 mg/m2 M-F             6/5/2019 Genetic Testing      Genetic testing was negative for mutations in BRCA1/2 and all 32 additional genes on the CancerNext Panel.           7/22/2019 Progression     CT chest abdomen and pelvis revealed rather extensive diffuse peritoneal carcinomatosis, at least 15-20 peritoneal nodules measuring between 1 cm in many as large as 2-3 cm scattered throughout the peritoneal cavity, significantly progressed from earlier this year.  No metastasis in the chest.  Small left pleural effusion with associated mild left bibasilar atelectasis, otherwise clear lungs.      8/8/2019 - 10/21/2019 Chemotherapy     OP COLON CapeOX+ Bevacizumab (Capecitabine / OXALIplatin / Bevacizumab)        10/28/2019 Progression     Compared to July the peritoneal carcinomatosis has progressed diffusely including down into the deep pelvis with numerous implants demonstrated.        10/28/2019 Imaging     CT chest abdomen pelvis shows previous granulomatous disease with some emphysematous changes, small left pleural effusion with atelectasis or pneumonitis with decrease in size compared to prior CT.  1.3 cm hepatic dome lesion likely related to serosal implant with mild perihepatic ascites.  Has perisplenic carcinomatosis with additional peritoneal carcinomatosis.  Compared to July the peritoneal carcinomatosis has progressed diffusely including down into the deep pelvis with numerous implants demonstrated.      11/1/2019 -  Other Event     Echocardiogram  · Left ventricular systolic function is normal.  · Calculated EF = 59.0%. Normal Global Longitudinal LV strain = -20.5%.  · Left ventricular diastolic dysfunction (grade II) consistent with pseudonormalization.  · Left atrial cavity size is mildly dilated.      11/14/2019 - 2/5/2020 Chemotherapy     OP COLORECTAL Cetuximab / Binimetinib / Encorafenib         12/11/2019 -  Other Event     Echocardiogram  · Left ventricular systolic function is normal.  · Calculated EF = 72.0%  · Normal  global longitudinal strain.      1/31/2020 Progression     CT chest abdomen pelvis shows new pleural metastases, increasing left lower lobe lung nodule, increasing carcinomatosis, and a new gastrohepatic ligament lymph node.        2/18/2020 -  Chemotherapy     OP COLORECTAL FOLFIRI + Bevacizumab-awwb (Irinotecan / Leucovorin / Fluorouracil / Bevacizumab)         HISTORY OF PRESENT ILLNESS:  The patient is a 64 y.o. female, here for follow up on management of metastatic colon cancer.  Now on Avastin FOLFIRI due for course #3.  States that she actually felt a little better with the second course with increased tolerance.  She had some fatigue that lasted for several days however last week she reports having a good week, she got out and walked when the weather permitted.  She has had less abdominal pain.  She had some headache intermittently and mild nausea and one episode of vomiting a few days after her last treatment.    History reviewed. No pertinent past medical history.  Past Surgical History:   Procedure Laterality Date   • BREAST LUMPECTOMY Left 2003    [facility]   • COLON SURGERY  2019    [facility] Dr. Hammer       Allergies   Allergen Reactions   • Penicillins Nausea And Vomiting       Family History and Social History reviewed and changed as necessary      REVIEW OF SYSTEM:   Review of Systems   Constitutional: Negative for appetite change, chills, diaphoresis, fever and unexpected weight change.  Positive for fatigue.  HENT:   Negative for mouth sores, sore throat and trouble swallowing.    Eyes: Negative for icterus.   Respiratory: Negative for cough, hemoptysis and shortness of breath.    Cardiovascular: Negative for chest pain, leg swelling and palpitations.   Gastrointestinal: Negative for abdominal distention, abdominal pain, blood in stool, constipation, diarrhea.  Positive for occasional nausea and vomiting.   Endocrine: Negative for hot flashes.   Genitourinary: Negative for bladder incontinence,  "difficulty urinating, dysuria, frequency and hematuria.    Musculoskeletal: Negative for gait problem, neck pain and neck stiffness.   Skin: Negative for rash.   Neurological: Negative for dizziness, gait problem, headaches, light-headedness and numbness.   Hematological: Negative for adenopathy. Does not bruise/bleed easily.   Psychiatric/Behavioral: Negative for depression. The patient is not nervous/anxious.    All other systems reviewed and are negative.       PHYSICAL EXAM    Vitals:    03/17/20 1104   BP: (!) 181/83   Pulse: 93   Resp: 18   Temp: 98.4 °F (36.9 °C)   Weight: 64.4 kg (142 lb)   Height: 167.6 cm (66\")     Vitals:    03/17/20 1104   PainSc: 0-No pain            Constitutional: Appears well-developed and well-nourished. No distress.   ECOG: (1) Restricted in Physically Strenuous Activity, Ambulatory & Able to Do Work of Light Nature  HENT:   Head: Normocephalic.   Mouth/Throat: Oropharynx is clear and moist.   Eyes: Conjunctivae are normal. Pupils are equal, round, and reactive to light. No scleral icterus.   Neck: Neck supple. No JVD present.   Cardiovascular: Normal rate, regular rhythm and normal heart sounds.    Pulmonary/Chest: Breath sounds normal. No respiratory distress.   Abdominal: Soft. Exhibits no distension. There is no tenderness.    Musculoskeletal:Exhibits no edema, tenderness or deformity.   Neurological: Alert and oriented to person, place, and time. Exhibits normal muscle tone.   Skin: No ecchymosis, no petechiae and no rash noted. Not diaphoretic. No cyanosis. Nails show no clubbing.   Psychiatric: Normal mood and affect.   Vitals reviewed.      Lab Results   Component Value Date    HGB 8.9 (L) 03/12/2020    HCT 28.8 (L) 03/12/2020    MCV 83 03/12/2020     03/12/2020    WBC 2.3 (L) 03/12/2020    NEUTROABS 1.1 (L) 03/12/2020    LYMPHSABS 0.6 (L) 03/12/2020    MONOSABS 0.4 03/12/2020    EOSABS 0.1 03/12/2020    BASOSABS 0.0 03/12/2020       Lab Results   Component Value " Date    GLUCOSE 100 (H) 02/14/2020    BUN 12 03/12/2020    CREATININE 0.59 03/12/2020     03/12/2020    K 3.8 03/12/2020     03/12/2020    CO2 25 03/12/2020    CALCIUM 9.1 03/12/2020    PROTEINTOT 7.0 02/14/2020    ALBUMIN 3.8 03/12/2020    BILITOT <0.2 03/12/2020    ALKPHOS 249 (H) 03/12/2020    AST 43 (H) 03/12/2020    ALT 69 (H) 03/12/2020           ASSESSMENT & PLAN:    1. Metastatic moderately differentiated colon cancer  2. Peritoneal carcinomatosis  3. Hypertension  4. Chemotherapy related nausea and vomiting  5. Nasal dryness  6. Elevated liver enzymes  7.   History of breast cancer 2003 treated with chemotherapy and radiation with Dr. Rivers  8.   History of cervical cancer treated with radiation in 1993       Discussion: The patient states she tolerated the second cycle much better.  She did have some nausea and one episode of vomiting, she also had intermittent headache.  She takes Zofran for her nausea which does seem to help but I discussed with her that it could be adding to her headaches.  I have sent a prescription for Compazine and we will see if that is effective and may be does not cause a headache.  She is hypertensive today, blood pressure 181/83, I have asked her to get with her primary care provider as she may need an antihypertensive, 1 of the side effects of Avastin is hypertension.  She also has some nasal dryness and occasional blood on the tissue when she blows her nose but no raji nosebleeds.  She will try saline nasal spray and understands to notify us if she has any nosebleeds as I would want to stop the Avastin.  I will continue with therapy unchanged today.  She has had no proteinuria.  Labs reviewed and are stable.  We will press on with Avastin FOLFIRI every 2 weeks and rescan herEarly April and I have ordered those scans to be done prior to return in 1 month.        Itzel Curran, APRN    03/17/2020

## 2020-03-18 NOTE — TELEPHONE ENCOUNTER
Patient called triage line in regards to her WBC being 2.3 She would like to know what this means and what she can do about it. Patient given education on neutropenic precautions. Patient to call our office if she develops S/S of infection, validated that patient does in fact have a thermometer. Patient to call office if she develops a temp of 100.4 or higher. Patient verbalized understanding. Patient was encouraged to call clinic with any further questions or concerns.

## 2020-03-26 NOTE — PROGRESS NOTES
Patient started new anti-hypertensive on 3/23/2020. Calling office this week with new medication information.

## 2020-04-14 NOTE — PROGRESS NOTES
CHIEF COMPLAINT: Follow-up progressive metastatic colon cancer    Problem List:  Oncology/Hematology History    1.  Stage IIIB [id number] moderately differentiated colon cancer  2.   History of breast cancer 2003 treated with chemotherapy and radiation with Dr. Rivers  3.   History of cervical cancer treated with radiation in 1993    -3/25/2019 screening colonoscopy found 5.5 cm tumor at 50 cm near the splenic fracture which was tattooed.  This showed moderately differentiated adenocarcinoma and there was a separate hyperplastic polyp 5 mm at 15 cm.  -3/27/2019 CT chest abdomen pelvis showed tumor at the splenic flexure T3 with transmural invasion and no distant metastases.  Positive gallstones and hiatal hernia.  CEA 2.9 with normal CMP.  -4/3/2019 transverse colectomy to the splenic flexure with primary re-anastomosis revealed moderately differentiated adenocarcinoma extending to the serosa/mesenteric fat with 8 out of 12 lymph nodes involved the largest being 2 cm.  Margins negative.  T3N2B stage IIIb  -4/30/2019 initial medical oncology visit: I reviewed her history.  She has 3 first-degree relatives with breast cancer, thyroid cancer in her family, prostate cancer in her first-degree relatives, and has had 2 other malignancies prior to her colon cancer as outlined above.  I have spoken with Dr. Freddy Esquivel to get mismatch repair testing.  She has seen Dr. Adam Royal who, because of the extent of this next to the spleen feels that localized radiation may be reasonable while not standard.  -5/14/2019 office visit: PCR showed she was mismatch repair proficient; therefore, consider Xeloda radiation followed by 6 months of Xeloda oxaliplatin.  Coordinated this with Dr. Royal who will start radiation May 27.  Following that we will start Xeloda oxaliplatin.  She will take 3 tablets of Xeloda twice a day Monday through Friday with radiation.  She will get a chemotherapy education appointment with our pharmacy  doctorate's in [place].  Though mismatch repair proficient, we will still have her see genetic counseling    -6/5/2019 genetic counseling negative    -Began radiation along with Xeloda 6/4/19 and completed 7/15/19    -7/22/2019 CT chest abdomen and pelvis showed progression of disease with extensive peritoneal carcinomatosis    -7/30/2019 office visit: I reviewed the results and images of her CT chest abdomen pelvis showing peritoneal carcinomatosis.  July 9 CEA elevated at 8.4.  Discussed with patient the plan to continue with the Xeloda oxaliplatin but to add Avastin.  Spoken with surgical oncologist at [facility] during a meeting who performs HIPEC.  He suggested primary chemotherapy and if get a response then send to surgeon accordingly.  I plan to give her Avastin Xeloda oxaliplatin and send strata as this is acting like a BRAF mutated cancer.  If that turns out to be the case, I will switch her to cetuximab, Encorafenib, and Binimetinib.  If we get a peritoneal response without distant metastatic visceral disease of the liver or lung appearing, then I will get her to Dr. Sims at the [facility] for consideration of peritoneal debulking with PCI scoring and possible HI PEC.  She understands the palliative nature of our attempts at this junction.    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  Microsatellite stable.  TMB low.  PD-L1 low.  Potential relevant therapies include Binimetinib + Encorafenib, see Strata report for full list.     -10/28/2019CT chest abdomen pelvis shows previous granulomatous disease with some emphysematous changes, small left pleural effusion with atelectasis or pneumonitis with decrease in size compared to prior CT.  1.3 cm hepatic dome lesion likely related to serosal implant with mild perihepatic ascites.  Has perisplenic carcinomatosis with additional peritoneal carcinomatosis.  Compared to July the peritoneal carcinomatosis  has progressed diffusely including down into the deep pelvis with numerous implants demonstrated.    -10/29/2019 [facility] medical oncology follow-up visit: I reviewed the images and reports of the CT scan above compared to July where there is clear-cut progression.  CEA had come down from 14.5 at the peak end of August to 4.9 as of 10/9/2019, clearly there is peritoneal progression on CT and she has poorly tolerated the Xeloda oxaliplatin Avastin.  Hence we will, based on that B BRAF V600 E mutation, place her on cetuximab, Binimetinib, and Encorafenib which is the most active regimen for this particular mutation and colorectal cancers.  We will check her ejection fraction and have her see my nurse practitioner back in a week to make sure that is doing well and start her into on this regimen and will have my pharmacy staff educate her on this.  Went over the side effects including rashes and diarrhea.  She is currently having diarrhea from the Xeloda and I will is on Lomotil and Imodium which hopefully will subside over the next week.    -11/1/2019 echocardiogram with normal left ventricular systolic function calculated EF 59%.    -1/31/2020 CT chest abdomen pelvis with contrast at [facility] shows multiple new pleural-based densities left lung concerning for pleural metastatic disease as well as increasing size of subcentimeter left lower lobe nodule.  Mild progression of carcinomatosis predominantly in the omentum compared to 10/24/1999 with a new mild amount of abnormal soft tissue density in the gastrohepatic ligament.  Hemoglobin 10 with platelets 554,000 normal white count.  CMP unremarkable.  Having increasing abdominal pains.  We will switch to Avastin FOLFIRI.     -4/14/2020 [facility] medical oncology visit: CT chest abdomen and pelvis shows progression of hepatic and omental metastatic disease.  We will change therapy at this time to Regorafinib.  I will start with 3 tablets daily 21 out of 28 days  and if tolerated increase to full dose of 4 tablets daily 21 out of 28 days.  CT also showed multiple gallstones and mild biliary dilatation, she is having some nausea and vomiting after eating certain foods.  We will have to monitor closely, may need surgical evaluation for gallbladder disease.          Overlapping malignant neoplasm of colon (CMS/HCC)    4/30/2019 Initial Diagnosis     Overlapping malignant neoplasm of colon (CMS/HCC)      6/4/2019 - 7/9/2019 Chemotherapy/Radiation     OP COLON Capecitabine 825 mg/m2 M-F            6/5/2019 Genetic Testing      Genetic testing was negative for mutations in BRCA1/2 and all 32 additional genes on the CancerNext Panel.           7/22/2019 Progression     CT chest abdomen and pelvis revealed rather extensive diffuse peritoneal carcinomatosis, at least 15-20 peritoneal nodules measuring between 1 cm in many as large as 2-3 cm scattered throughout the peritoneal cavity, significantly progressed from earlier this year.  No metastasis in the chest.  Small left pleural effusion with associated mild left bibasilar atelectasis, otherwise clear lungs.      8/8/2019 - 10/21/2019 Chemotherapy     OP COLON CapeOX+ Bevacizumab (Capecitabine / OXALIplatin / Bevacizumab)        10/28/2019 Progression     Compared to July the peritoneal carcinomatosis has progressed diffusely including down into the deep pelvis with numerous implants demonstrated.        10/28/2019 Imaging     CT chest abdomen pelvis shows previous granulomatous disease with some emphysematous changes, small left pleural effusion with atelectasis or pneumonitis with decrease in size compared to prior CT.  1.3 cm hepatic dome lesion likely related to serosal implant with mild perihepatic ascites.  Has perisplenic carcinomatosis with additional peritoneal carcinomatosis.  Compared to July the peritoneal carcinomatosis has progressed diffusely including down into the deep pelvis with numerous implants demonstrated.       11/1/2019 -  Other Event     Echocardiogram  · Left ventricular systolic function is normal.  · Calculated EF = 59.0%. Normal Global Longitudinal LV strain = -20.5%.  · Left ventricular diastolic dysfunction (grade II) consistent with pseudonormalization.  · Left atrial cavity size is mildly dilated.      11/14/2019 - 2/5/2020 Chemotherapy     OP COLORECTAL Cetuximab / Binimetinib / Encorafenib         12/11/2019 -  Other Event     Echocardiogram  · Left ventricular systolic function is normal.  · Calculated EF = 72.0%  · Normal global longitudinal strain.      1/31/2020 Progression     CT chest abdomen pelvis shows new pleural metastases, increasing left lower lobe lung nodule, increasing carcinomatosis, and a new gastrohepatic ligament lymph node.        2/18/2020 - 4/13/2020 Chemotherapy     OP COLORECTAL FOLFIRI + Bevacizumab-awwb (Irinotecan / Leucovorin / Fluorouracil / Bevacizumab)      4/8/2020 Progression     CT chest, abdomen and pelvis show progression of hepatic and omental metastatic disease.  There is a new 6 mm low-attenuation lesion along the posterior segment of the right hepatic lobe.  Previously described subcentimeter lesion along the posterior segment of the right hepatic lobe now measures 9 mm.  Multiple gallstones, development of mild biliary dilatation primarily along the left hepatic lobe.  Diffuse omental metastasis appears worse compared to prior study with approximate thickness of 1.8 cm previously 1.2 cm.      4/28/2020 -  Chemotherapy     OP COLON Regorafenib         HISTORY OF PRESENT ILLNESS:  The patient is a 64 y.o. female, here for follow up on management of metastatic colon cancer.  On Avastin FOLFIRI she unfortunately now has progression of disease shown on current CT scans with progression of hepatic and omental metastasis.  Overall, she states that she felt she was tolerating therapy fairly well.  She did have some increase mucosal drainage, some nosebleeds that improved  with saline nasal spray.  States her abdominal pain is better however she is having nausea and vomiting after eating certain foods.      History reviewed. No pertinent past medical history.  Past Surgical History:   Procedure Laterality Date   • BREAST LUMPECTOMY Left 2003    [facility]   • [place]  [place]    [facility] [place] [place]       Allergies   Allergen Reactions   • Penicillins Nausea And Vomiting       Family History and Social History reviewed and changed as necessary      REVIEW OF SYSTEM:   Review of Systems   Constitutional: Negative for appetite change, chills, diaphoresis, fever and unexpected weight change.  Positive for fatigue.  HENT:   Negative for mouth sores, sore throat and trouble swallowing.  Positive for sinus drainage.  Eyes: Negative for icterus.   Respiratory: Negative for cough, hemoptysis and shortness of breath.    Cardiovascular: Negative for chest pain, leg swelling and palpitations.   Gastrointestinal: Negative for abdominal distention, abdominal pain, blood in stool, constipation, diarrhea.  Positive for intermittent nausea and vomiting, particularly after certain foods such as spaghetti or tomato based foods.   Endocrine: Negative for hot flashes.   Genitourinary: Negative for bladder incontinence, difficulty urinating, dysuria, frequency and hematuria.    Musculoskeletal: Negative for gait problem, neck pain and neck stiffness.   Skin: Negative for rash.  Positive for nail changes with some splitting of nailbeds and dry skin.  Neurological: Negative for dizziness, gait problem, headaches, light-headedness and numbness.   Hematological: Negative for adenopathy. Does not bruise/bleed easily.   Psychiatric/Behavioral: Negative for depression. The patient is not nervous/anxious.    All other systems reviewed and are negative.       PHYSICAL EXAM    Vitals:    04/14/20 0820   BP: 154/89   Pulse: 85   Resp: 18   Temp: 98.1 °F (36.7 °C)   SpO2: 97%   Weight: 64 kg (141 lb)     Vitals:     04/14/20 0820   PainSc: 0-No pain            Constitutional: Petite female, appears well-developed and well-nourished. No distress.   ECOG: (1) Restricted in Physically Strenuous Activity, Ambulatory & Able to Do Work of Light Nature  HENT:   Head: Normocephalic.   Mouth/Throat: Oropharynx is clear and moist.   Eyes: Conjunctivae are normal. Pupils are equal, round, and reactive to light. No scleral icterus.   Neck: Neck supple. No JVD present.   Cardiovascular: Normal rate, regular rhythm and normal heart sounds.    Pulmonary/Chest: Breath sounds normal. No respiratory distress.   Abdominal: Soft. Exhibits no distension. There is no tenderness.    Musculoskeletal:Exhibits no edema, tenderness or deformity.   Neurological: Alert and oriented to person, place, and time. Exhibits normal muscle tone.   Skin: No ecchymosis, no petechiae and no rash noted. Not diaphoretic. No cyanosis. Nails show some splitting at the ends.   Psychiatric: Normal mood and affect.   Vitals reviewed.  Labs reviewed.    Lab Results   Component Value Date    HGB 9.5 (L) 04/09/2020    HCT 30.5 (L) 04/09/2020    MCV 83.6 04/09/2020     04/09/2020    WBC 2.19 (L) 04/09/2020    NEUTROABS 1.06 (L) 04/09/2020    LYMPHSABS 0.63 (L) 04/09/2020    MONOSABS 0.32 04/09/2020    EOSABS 0.14 04/09/2020    BASOSABS 0.04 04/09/2020       Lab Results   Component Value Date    GLUCOSE 100 (H) 02/14/2020    BUN 15 04/09/2020    CREATININE 0.61 04/09/2020     04/09/2020    K 3.6 04/09/2020     04/09/2020    CO2 26.7 04/09/2020    CALCIUM 9.0 04/09/2020    PROTEINTOT 7.0 02/14/2020    ALBUMIN 4.20 04/09/2020    BILITOT 0.2 04/09/2020    ALKPHOS 215 (H) 04/09/2020    AST 44 (H) 04/09/2020    ALT 53 (H) 04/09/2020           ASSESSMENT & PLAN:    1. Metastatic moderately differentiated colon cancer  2. Peritoneal carcinomatosis  3. Hypertension  4. Nausea and vomiting  5. Elevated liver enzymes  7.   History of breast cancer 2003 treated with  chemotherapy and radiation with Dr. Rivers  8.   History of cervical cancer treated with radiation in 1993       Discussion: Unfortunately current CT chest abdomen and pelvis shows progression of hepatic and omental metastatic disease.  We will change therapy at this time to Regorafinib.  I will start with 3 tablets daily 21 out of 28 days and if tolerated increase to full dose of 4 tablets daily 21 out of 28 days.  We discussed potential side effects including but not limited to decreased blood counts, increased liver enzymes, increased amylase and lipase, hand-foot syndrome, diarrhea.  She understands to take the medication with a low-fat breakfast daily 21 out of 28 days.  She will also receive further education from our pharmacist.  CT also showed multiple gallstones and mild biliary dilatation, she is having some nausea and vomiting after eating certain foods.  We will have to monitor closely, may need surgical evaluation for gallbladder disease.    Labs reviewed and are stable, she does have mild neutropenia but we are holding treatment today as we are switching therapy.  She understands to use good precautions, she is staying home and sheltering in place in light of COVID 19 pandemic.      We will see her back in 2 weeks for follow-up with Dr. Menchaca.    I spent a total of 25 minutes in direct patient care, greater than 16  minutes face to face were spent in coordination of care, and counseling the patient regarding reviewing current CT scans showing progression of disease, current symptom management, discussing new treatment plan. Answered any questions patient had regarding medications and plan of care.     Itzel Curran, APRN    04/14/2020

## 2020-04-15 NOTE — PROGRESS NOTES
Oral Chemotherapy Teaching      Patient Name/:  Naz Singh   1955  Oral Chemotherapy Regimen:  Regorafenib 120mg PO daily Days 1-21 then off 7 days  Date Started Medication: As soon as medication is available    Additional Notes:  Oral chemotherapy clinic received referral from GATITO Flanagan regarding the initiation of regorafenib. Reviewed patient chart. Released script for regorafenib 120mg (3 tablets) PO daily Days 1-21 then off 7 days, #63 with 3 RF to [facility] to begin processing.  If patient tolerates this dosage, goal is to increase regorafenib to 160mg daily Days 1-21 then off 7 days.  Pt will be educated on the telephone for oral chemotherapy education and financial navigation appt. Education provided via telephone in an effort to practice spatial distancing and reduce transmission risk of COVID-19 for both patients and employees during the COVID-19 pandemic.   Patient has a CCA on file.

## 2020-04-15 NOTE — PROGRESS NOTES
Drug: Stivarga  Strength: 40mg tablet  Directions: Take 3 tablets (120mg) PO once daily x 21 days, followed by 7 days off   QTY: 63  RF:3    Released to pharmacy: [facility]    Completed independent double check on medication order/RX.

## 2020-04-16 NOTE — PROGRESS NOTES
Oral Chemotherapy Teaching      Patient Name/:  Naz Singh   1955  Oral Chemotherapy Regimen:  Regorafenib 120mg PO daily Days 1-21 then off 7 days  Date Started Medication: As soon as medication is available    Initial Teaching Follow Up Comments     Safety     Storage instructions (away from children; away from heat/cold, sunlight, or moisture), handling - use of gloves (caregivers), washing hands after touching pills, managing waste     “How are you storing your medications?”, reminders on storage, proper handling (caregivers using gloves, washing hands, away from children, managing waste, etc.), disposal of medication with D/C or dosage change    Store medication safe away from children and pets, at room temp away from light. If you use a pill box, use a separate pill box from other medication. Counseled patient on safe handling of soiled linen and proper flush technique.  Discussed if the patient is handling their own medications, then they need to wash their hands properly after touching the medication.  If a caregiver is assisting with handling medication, need to wear disposal gloves and wash hands properly afterwards     Adherence      patient and/or caregiver on how to take medication, take with/without food, assess their adherence potential, stress importance of adherence, ways to manage adherence (pill boxes, phone reminders, calendars), what to do if miss a dose   “How are you taking your medication?” “How are you remembering to take your medication?”, “How many doses have you missed?”, determine reasons for non-adherence (not remembering, side effects, etc), ways to improve, overadherence? Remind patient of ways to improve/maintain adherence   Provided calendar to help improve adherence. Take medication once a day with a full glass of water for 21 days, followed by 7 days off.  Discussed taking medication with a low fat and low calorie meal.  Dory Cuevas RN provided some written  materials regarding low fat meals that I have mailed to the patient. Discussed patient can take within 12 hours if a dose is forgotten, but don't double up on doses. Provided patient with written materials and packet on medication.      Side Effects/Adverse Reactions      patient on potential side effects, s/s, ways to manage, when to call MD/seek help     Determine if patient experiencing side effects, ways to manage  Counseled on home management and when to call MD on ADRs.  Discussed most common ADRs including but not limited to edema, HFS, diarrhea, fatigue, mucositis, changes in liver function, HTN, and nausea/vomiting. Reviewed PRN use of ondansetron for nausea/vomiting. Discussed application of moisturizing cream daily on hands and feet to reduce risk of HFS.  Patient recently started BP medications - discussed monitoring BP.     Miscellaneous     Food interactions, DDIs, financial issues Determine if patient started any new medications since being placed on oral chemo (analyze for DDI) No DDI identified to discuss with patient.  Advised patient on the process of obtaining the medication from a specialty pharmacy.     Additional Notes:  Discussed aforementioned material with patient over the phone. Education provided via telephone in an effort to practice spatial distancing and reduce transmission risk of COVID-19 for both patients and employees during the COVID-19 pandemic.  All questions and concerns addressed. Provided patient with my contact information and instructions to call should additional questions arise. Mailed patient a personalized treatment calendar and written educational material. Patient has CCA on file.

## 2020-04-30 NOTE — TELEPHONE ENCOUNTER
I read all of the [employer] messages from Mrs. Singh and called her.  She states for the past few days she has had vomiting after eating small amounts, since last night she has not been able to take anything by mouth without throwing it back up.  States that she has been throwing up a lot of bile along with whatever food she takes in.  Was able to take a Zofran tablet earlier today but threw up the water she drank with it.  Was able to keep down a popsicle.  Reports having had bowel movement yesterday and the day before that appeared normal.  Some abdominal pain and discomfort.  Occasional low-grade temperature up to 100, no fever today.  No respiratory concerns, no cough or shortness of breath.  Has been home sheltering in place with her .  I discussed with her that I am concerned for possible bowel obstruction and [facility] that [facility].  She states agreement and understanding and will go today.

## 2020-04-30 NOTE — TELEPHONE ENCOUNTER
----- Message from Erin Austin RN sent at 4/30/2020 11:57 AM EDT -----  Regarding: FW: Non-Urgent Medical Question  Contact: [phone]      ----- Message -----  From: Naz Singh  Sent: 4/30/2020  11:42 AM EDT  To: e [facility]  Subject: Non-Urgent Medical Question                      Itzel, I just took a pill(nausea) with water. The pill stayed down but water came back up and it feels like in my throat it was gonna close up.

## 2020-05-04 NOTE — TELEPHONE ENCOUNTER
Discussed with Dr. Menchaca.  He wants patient to go to her PCP office today and have them check her BP.  Patient notified and verbalized understanding

## 2020-05-04 NOTE — TELEPHONE ENCOUNTER
----- Message from Naz Singh sent at 5/4/2020 10:05 AM EDT -----  Regarding: RE: Non-Urgent Medical Question  Contact: [phone]  I do have a blood pressure monitor. On 5/3 my reading was 192/122/83 and on 5/4 my reading was 184/122/79. Also, let Dr Menchaca office know I got message from Carlos Eduardo that I will see Dr Menchaca tomorrow 5/5/20 @ 11:30.  ----- Message -----  From: AURA REYES  Sent: 5/4/2020  9:46 AM EDT  To: Naz Singh  Subject: RE: Non-Urgent Medical Question  Hi Mrs. Singh,  My name is Meliza, I am the triage nurse today helping with symptom management. Unfortunately, the hand and feet symptoms you are describing are a fairly common side effect of Regorafenib. Avoid using any alcohol-containing products that could potentially dry out your skin. I suggest you try a thick lotion, such as Eucerin or utterly smooth brand, apply a thick later on your hands and feet at night then cover with gloves/ socks. If no improvement or symptoms get worse be sure to let Dr Menchaca/Itzel know because decreasing your dose may be the only thing to help improve this.   How high has your blood pressure been? Do you have an automated cuff at home you can check with? Do you mind to send me back a few readings?    Meliza         ----- Message -----     From: Naz Singh     Sent: 5/3/2020  2:55 AM EDT       To: GATITO Griffin  Subject: Non-Urgent Medical Question    Dayne Robbins. Want to let you know that I'm experiencing some side affects(from chemo. pill) which are hand and foot syndrome and high blood pressure.

## 2020-05-05 NOTE — PROGRESS NOTES
CHIEF COMPLAINT: BRAF mutated metastatic colon cancer    Problem List:  Oncology/Hematology History    1.  Stage IIIB [id number] moderately differentiated colon cancer  2.   History of breast cancer 2003 treated with chemotherapy and radiation with Dr. Rivers  3.   History of cervical cancer treated with radiation in 1993    -3/25/2019 screening colonoscopy found 5.5 cm tumor at 50 cm near the splenic fracture which was tattooed.  This showed moderately differentiated adenocarcinoma and there was a separate hyperplastic polyp 5 mm at 15 cm.  -3/27/2019 CT chest abdomen pelvis showed tumor at the splenic flexure T3 with transmural invasion and no distant metastases.  Positive gallstones and hiatal hernia.  CEA 2.9 with normal CMP.  -4/3/2019 transverse colectomy to the splenic flexure with primary re-anastomosis revealed moderately differentiated adenocarcinoma extending to the serosa/mesenteric fat with 8 out of 12 lymph nodes involved the largest being 2 cm.  Margins negative.  T3N2B stage IIIb  -4/30/2019 initial medical oncology visit: I reviewed her history.  She has 3 first-degree relatives with breast cancer, thyroid cancer in her family, prostate cancer in her first-degree relatives, and has had 2 other malignancies prior to her colon cancer as outlined above.  I have spoken with Dr. Freddy Esquivel to get mismatch repair testing.  She has seen Dr. Adam Royal who, because of the extent of this next to the spleen feels that localized radiation may be reasonable while not standard.  -5/14/2019 office visit: PCR showed she was mismatch repair proficient; therefore, consider Xeloda radiation followed by 6 months of Xeloda oxaliplatin.  Coordinated this with Dr. Royal who will start radiation May 27.  Following that we will start Xeloda oxaliplatin.  She will take 3 tablets of Xeloda twice a day Monday through Friday with radiation.  She will get a chemotherapy education appointment with our pharmacy doctorate's  in [place].  Though mismatch repair proficient, we will still have her see genetic counseling    -6/5/2019 genetic counseling negative    -Began radiation along with Xeloda 6/4/19 and completed 7/15/19    -7/22/2019 CT chest abdomen and pelvis showed progression of disease with extensive peritoneal carcinomatosis    -7/30/2019 office visit: I reviewed the results and images of her CT chest abdomen pelvis showing peritoneal carcinomatosis.  July 9 CEA elevated at 8.4.  Discussed with patient the plan to continue with the Xeloda oxaliplatin but to add Avastin.  Spoken with surgical oncologist at [facility] during a meeting who performs HIPEC.  He suggested primary chemotherapy and if get a response then send to surgeon accordingly.  I plan to give her Avastin Xeloda oxaliplatin and send strata as this is acting like a BRAF mutated cancer.  If that turns out to be the case, I will switch her to cetuximab, Encorafenib, and Binimetinib.  If we get a peritoneal response without distant metastatic visceral disease of the liver or lung appearing, then I will get her to Dr. Sims at the [facility] for consideration of peritoneal debulking with PCI scoring and possible HI PEC.  She understands the palliative nature of our attempts at this junction.    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  Microsatellite stable.  TMB low.  PD-L1 low.  Potential relevant therapies include Binimetinib + Encorafenib, see Strata report for full list.     -10/28/2019CT chest abdomen pelvis shows previous granulomatous disease with some emphysematous changes, small left pleural effusion with atelectasis or pneumonitis with decrease in size compared to prior CT.  1.3 cm hepatic dome lesion likely related to serosal implant with mild perihepatic ascites.  Has perisplenic carcinomatosis with additional peritoneal carcinomatosis.  Compared to July the peritoneal carcinomatosis has  progressed diffusely including down into the deep pelvis with numerous implants demonstrated.    -10/29/2019 [facility] medical oncology follow-up visit: I reviewed the images and reports of the CT scan above compared to July where there is clear-cut progression.  CEA had come down from 14.5 at the peak end of August to 4.9 as of 10/9/2019, clearly there is peritoneal progression on CT and she has poorly tolerated the Xeloda oxaliplatin Avastin.  Hence we will, based on that B BRAF V600 E mutation, place her on cetuximab, Binimetinib, and Encorafenib which is the most active regimen for this particular mutation and colorectal cancers.  We will check her ejection fraction and have her see my nurse practitioner back in a week to make sure that is doing well and start her into on this regimen and will have my pharmacy staff educate her on this.  Went over the side effects including rashes and diarrhea.  She is currently having diarrhea from the Xeloda and I will is on Lomotil and Imodium which hopefully will subside over the next week.    -11/1/2019 echocardiogram with normal left ventricular systolic function calculated EF 59%.    -1/31/2020 CT chest abdomen pelvis with contrast at [facility] shows multiple new pleural-based densities left lung concerning for pleural metastatic disease as well as increasing size of subcentimeter left lower lobe nodule.  Mild progression of carcinomatosis predominantly in the omentum compared to 10/24/1999 with a new mild amount of abnormal soft tissue density in the gastrohepatic ligament.  Hemoglobin 10 with platelets 554,000 normal white count.  CMP unremarkable.  Having increasing abdominal pains.  We will switch to Avastin FOLFIRI.     -4/14/2020 [facility] medical oncology visit: CT chest abdomen and pelvis shows progression of hepatic and omental metastatic disease.  We will change therapy at this time to Regorafinib.  I will start with 3 tablets daily 21 out of 28 days and if  tolerated increase to full dose of 4 tablets daily 21 out of 28 days.  CT also showed multiple gallstones and mild biliary dilatation, she is having some nausea and vomiting after eating certain foods.  We will have to monitor closely, may need surgical evaluation for gallbladder disease.    -4/30/2020 emergency room visit for intractable nausea and vomiting.  CT showed large hiatal hernia without gastric outlet obstruction or small bowel obstruction.  Metastatic lesion in the liver with gallstones and mild intrahepatic biliary ductal dilation without bowel obstruction but multiple peritoneal implants.          Overlapping malignant neoplasm of colon (CMS/HCC)    4/30/2019 Initial Diagnosis     Overlapping malignant neoplasm of colon (CMS/HCC)      6/4/2019 - 7/9/2019 Chemotherapy/Radiation     OP COLON Capecitabine 825 mg/m2 M-F            6/5/2019 Genetic Testing      Genetic testing was negative for mutations in BRCA1/2 and all 32 additional genes on the CancerNext Panel.           7/22/2019 Progression     CT chest abdomen and pelvis revealed rather extensive diffuse peritoneal carcinomatosis, at least 15-20 peritoneal nodules measuring between 1 cm in many as large as 2-3 cm scattered throughout the peritoneal cavity, significantly progressed from earlier this year.  No metastasis in the chest.  Small left pleural effusion with associated mild left bibasilar atelectasis, otherwise clear lungs.      8/8/2019 - 10/21/2019 Chemotherapy     OP COLON CapeOX+ Bevacizumab (Capecitabine / OXALIplatin / Bevacizumab)        10/28/2019 Progression     Compared to July the peritoneal carcinomatosis has progressed diffusely including down into the deep pelvis with numerous implants demonstrated.        10/28/2019 Imaging     CT chest abdomen pelvis shows previous granulomatous disease with some emphysematous changes, small left pleural effusion with atelectasis or pneumonitis with decrease in size compared to prior CT.  1.3  cm hepatic dome lesion likely related to serosal implant with mild perihepatic ascites.  Has perisplenic carcinomatosis with additional peritoneal carcinomatosis.  Compared to July the peritoneal carcinomatosis has progressed diffusely including down into the deep pelvis with numerous implants demonstrated.      11/1/2019 -  Other Event     Echocardiogram  · Left ventricular systolic function is normal.  · Calculated EF = 59.0%. Normal Global Longitudinal LV strain = -20.5%.  · Left ventricular diastolic dysfunction (grade II) consistent with pseudonormalization.  · Left atrial cavity size is mildly dilated.      11/14/2019 - 2/5/2020 Chemotherapy     OP COLORECTAL Cetuximab / Binimetinib / Encorafenib         12/11/2019 -  Other Event     Echocardiogram  · Left ventricular systolic function is normal.  · Calculated EF = 72.0%  · Normal global longitudinal strain.      1/31/2020 Progression     CT chest abdomen pelvis shows new pleural metastases, increasing left lower lobe lung nodule, increasing carcinomatosis, and a new gastrohepatic ligament lymph node.        2/18/2020 - 4/13/2020 Chemotherapy     OP COLORECTAL FOLFIRI + Bevacizumab-awwb (Irinotecan / Leucovorin / Fluorouracil / Bevacizumab)      4/8/2020 Progression     CT chest, abdomen and pelvis show progression of hepatic and omental metastatic disease.  There is a new 6 mm low-attenuation lesion along the posterior segment of the right hepatic lobe.  Previously described subcentimeter lesion along the posterior segment of the right hepatic lobe now measures 9 mm.  Multiple gallstones, development of mild biliary dilatation primarily along the left hepatic lobe.  Diffuse omental metastasis appears worse compared to prior study with approximate thickness of 1.8 cm previously 1.2 cm.      4/28/2020 - 4/28/2020 Chemotherapy     OP COLON Regorafenib      5/5/2020 Adverse Reaction     Hypertension and general debilitation on Regorafinib.      5/14/2020 -   Chemotherapy     OP COLON Trifluidine / Tipiracil         HISTORY OF PRESENT ILLNESS:  The patient is a 64 y.o. female, here for follow up on management of B bunny mutated colon cancer metastatic to peritoneum.  On just a few days of Regorafinib her hypertension has been out of control and quickly developed severe dysesthesias of her feet and hands.  Went to the emergency room the end of April with symptoms that suggested obstruction but none was found but does have peritoneal carcinomatosis which we knew about causing this symptom.      Past Medical History:   Diagnosis Date   • Anemia    • Cancer (CMS/HCC)    • Disease of thyroid gland    • GERD (gastroesophageal reflux disease)    • Hypertension      Past Surgical History:   Procedure Laterality Date   • BREAST L[facility]OMY [facility] 2003    [facility]   • [place]  [place]    [facility] [place]       Allergies   Allergen Reactions   • Penicillins Nausea And Vomiting       Family History and Social History reviewed and changed as necessary      REVIEW OF SYSTEM:   Review of Systems   Constitutional: Negative for appetite change, chills, diaphoresis, fatigue, fever and unexpected weight change.   HENT:   Negative for mouth sores, sore throat and trouble swallowing.    Eyes: Negative for icterus.   Respiratory: Negative for cough, hemoptysis and shortness of breath.    Cardiovascular: Negative for chest pain, leg swelling and palpitations.   Gastrointestinal: Negative for abdominal distention, abdominal pain, blood in stool, constipation, diarrhea, nausea and vomiting.   Endocrine: Negative for hot flashes.   Genitourinary: Negative for bladder incontinence, difficulty urinating, dysuria, frequency and hematuria.    Musculoskeletal: Negative for gait problem, neck pain and neck stiffness.   Skin: Negative for rash.   Neurological: Negative for dizziness, gait problem, headaches, light-headedness and numbness.   Hematological: Negative for adenopathy. Does not  bruise/bleed easily.   Psychiatric/Behavioral: Negative for depression. The patient is not nervous/anxious.    All other systems reviewed and are negative.       PHYSICAL EXAM    Vitals:    05/05/20 1142   BP: 156/92   Pulse: 114   Resp: 18   Temp: 97.5 °F (36.4 °C)   SpO2: 97%   Weight: 61.7 kg (136 lb)     Vitals:    05/05/20 1142   PainSc:   9   PainLoc: Foot        Constitutional: Appears well-developed and well-nourished. No distress.   ECOG: (2) Ambulatory & Capable of Self Care, Unable to Carry Out Work Activity, Up & About Greater Than 50% of Waking Hours  HENT:   Head: Normocephalic.   Mouth/Throat: Oropharynx is clear and moist.   Eyes: Conjunctivae are normal. Pupils are equal, round, and reactive to light. No scleral icterus.   Neck: Neck supple. No JVD present. No thyromegaly present.   Cardiovascular: Normal rate, regular rhythm and normal heart sounds.    Pulmonary/Chest: Breath sounds normal. No respiratory distress.   Abdominal: Soft. Exhibits no distension and no mass. There is no hepatosplenomegaly. There is no tenderness. There is no rebound and no guarding.   Musculoskeletal:Exhibits no edema, tenderness or deformity.   Neurological: Alert and oriented to person, place, and time. Exhibits normal muscle tone.   Skin: No ecchymosis, no petechiae and no rash noted. Not diaphoretic. No cyanosis. Nails show no clubbing.   Psychiatric: Normal mood and affect.   Vitals reviewed.      Lab Results   Component Value Date    HGB 11.8 (L) 04/30/2020    HCT 38.4 04/30/2020    MCV 83.7 04/30/2020     04/30/2020    WBC 9.08 04/30/2020    NEUTROABS 6.72 04/30/2020    LYMPHSABS 1.09 04/30/2020    MONOSABS 0.92 (H) 04/30/2020    EOSABS 0.23 04/30/2020    BASOSABS 0.10 04/30/2020       Lab Results   Component Value Date    GLUCOSE 81 04/30/2020    BUN 19 04/30/2020    CREATININE 0.63 04/30/2020     04/30/2020    K 3.2 (L) 04/30/2020     04/30/2020    CO2 26.0 04/30/2020    CALCIUM 9.3 04/30/2020  "   PROTEINTOT 7.9 04/30/2020    ALBUMIN 4.40 04/30/2020    BILITOT 0.4 04/30/2020    ALKPHOS 322 (H) 04/30/2020    AST 32 04/30/2020    ALT 29 04/30/2020                   ASSESSMENT & PLAN:    1. Stage IIIB [id number] moderately differentiated colon cancer  2.   History of breast cancer 2003 treated with chemotherapy and radiation with Dr. Rivers  3.   History of cervical cancer treated with radiation in 1993  4.   elevated liver enzymes    Discussion: She is not going to tolerate Regorafinib.  She has significant peritoneal carcinomatosis causing most of her early satiety and nausea.  We talked about hospice versus trial of Lonsurf.  We will try her on Lonsurf which may take about a week to get supplied and then we will see her back for course #2 of therapy with my nurse practitioner but if that does not work or is intolerable then I would go with hospice care.  Determining what is \"working\" will be difficult as this peritoneal carcinomatosis is hard to quantify and it will mostly boiled down to how she is feeling.  I encouraged her to communicate with us by phone rather than multiple patient in baskets.  Discussed with patient face-to-face 40 minutes greater than 50% spent counseling regarding this very difficult decision tree.      Zhang Menchaca MD    05/05/2020      "

## 2020-05-05 NOTE — PROGRESS NOTES
Drug: Lonsurf  Strength: 20-8.12mg tablet  Directions: Tale 3 tablets (60mg) PO BID on Days 1-5 and 8-12 of a 28 day cycle  QTY:60  RF:3    Released to pharmacy: [facility]     Completed independent double check on medication order/RX.

## 2020-05-05 NOTE — PROGRESS NOTES
Oral Chemotherapy Teaching      Patient Name/:  Naz Singh   1955  Oral Chemotherapy Regimen:  Lonsurf 3 tablets PO BID Days 1-5, 8-12 every 28 days  Date Started Medication: As soon as medication is available    Additional Notes:  Oral chemotherapy clinic received referral from Dr. Menchaca regarding the initiation of Lonsurf. Reviewed patient chart. Released script for Lonsurf 3 tablets PO BID Days 1-5 and 8-12 every 28 days, #60 with 3 RF to [facility] to begin processing. Pt will be educated on the phone for oral chemotherapy education and financial navigation. Education provided via telephone in an effort to practice spatial distancing and reduce transmission risk of COVID-19 for both patients and employees during the COVID-19 pandemic.  Patient has a CCA on file.

## 2020-05-06 NOTE — PROGRESS NOTES
Patient Name/:  Naz Singh; 51  Oral Chemotherapy Regimen:  Lonsurf 20-8.19mg take 3 tablets every 12 hours on days 1-5 and 8-12 every 28 days  Date Started Medication: Pending aquisition    Initial Teaching Follow Up Comments     Safety     Storage instructions (away from children; away from heat/cold, sunlight, or moisture), handling - use of gloves (caregivers), washing hands after touching pills, managing waste     “How are you storing your medications?”, reminders on storage, proper handling (caregivers using gloves, washing hands, away from children, managing waste, etc.), disposal of medication with D/C or dosage change    Discussed the handling of the drug with the patient including use of gloves, hand hygiene, and administration. Also discussed storing the drug in a cool, dry place out of reach from children. The patient expressed understanding.     Adherence      patient and/or caregiver on how to take medication, take with/without food, assess their adherence potential, stress importance of adherence, ways to manage adherence (pill boxes, phone reminders, calendars), what to do if miss a dose   “How are you taking your medication?” “How are you remembering to take your medication?”, “How many doses have you missed?”, determine reasons for non-adherence (not remembering, side effects, etc), ways to improve, overadherence? Remind patient of ways to improve/maintain adherence   Discussed the planned regimen of 3 tablets twice a day on days 1-5 and 8-12 every 28 days. She was instructed to take it with food (preferably breakfast and dinner) and to drink plenty of water. She was told that if she forgot to take a dose and it had been less than 1-2 hours that it was okay to take. Otherwise, she was advised to wait until the next dose was due and never double up on doses. She expressed some confusion on how many weeks she would be taking this twice daily regimen and was told the 5-day intervals  would only be for the first two weeks of the 28-day cycle. She was informed a calendar would be provided.      Side Effects/Adverse Reactions      patient on potential side effects, s/s, ways to manage, when to call MD/seek help     Determine if patient experiencing side effects, ways to manage  The patient was educated on potential side effects of the medication as well as proper management and when to contact her physician. She was informed that fatigue and weakness were likely. She was also told there was a chance the medication could cause a loss of appetite, an issue the patient is already having trouble controlling. She was told the risk of vomiting was low to moderate and the patient expressed she had antiemetics on hand. She also was educated on the risk of diarrhea and advised to stay hydrated given her history of diarrhea with previous regimens. She expressed understanding.      Miscellaneous     Food interactions, DDIs, financial issues Determine if patient started any new medications since being placed on oral chemo (analyze for DDI) A medication utilization review was preformed and found free of major drug interactions. The patient was asked to call and inform [facility] if she was started on any new medications by prescription or over-the-counter. She said she was recently started on levothyroxine and iron supplements but otherwise was taking the same medications as shown in her records. She also was instructed on safe sex practices while taking the medication.      Additional Notes:  Discussed aforementioned material with patient over the phone and all pertinent questions answered. Education provided via telephone in an effort to practice spatial distancing and reduce transmission risk of COVID-19 for both patients and employees during the COVID-19 pandemic.  All questions and concerns addressed. Provided patient with contact information for Oncology Pharmacist and instructions to call should  additional questions arise. A consent form will be mailed to the patient for signing along with a treatment calendar, and education sheet. Verified the patient's mailing address.

## 2020-05-07 NOTE — TELEPHONE ENCOUNTER
Patient said she was needing to speak to Erin(RN) about the drugs she was on. She was advised to call us. She can be reached at [phone]

## 2020-05-08 NOTE — TELEPHONE ENCOUNTER
Discussed with Dr. Menchaca.  Patient can try physical therapy if she wants, but he did not feel it would be helpful for her pain.  He would recommend pain medication to treat.  Left  for patient and asked her to call back if she would like us to send something in.

## 2020-05-08 NOTE — TELEPHONE ENCOUNTER
Zhang Menchaca MD     Pt called wanting to speak to Erin about her medication list.     Please call pt     Thanks

## 2020-05-08 NOTE — TELEPHONE ENCOUNTER
Patient called and stated that she was having some neck and shoulder pain. She is not sure what she has done to cause the pain but it is causing her discomfort. She cannot sleep or sit comfortably. She has been taking OTC meds to ease the pain. She was wanting to know if it would be okay if she saw a physical therapist to evaluate her pain and see if she could get some relief. Wanting a call back to let her know.

## 2020-05-08 NOTE — TELEPHONE ENCOUNTER
Patient called stating she could not find any info on her prescription for norvasc.  She had a new prescription for cozaar, but had stopped her norvasc.  Advised patient to contact PCP regarding which her BP meds she should be taking.  Verbalized understanding.

## 2020-05-14 NOTE — TELEPHONE ENCOUNTER
Reviewed CT scans from the last year with GATITO Flanagan.  Gallstones have been present since cancer diagnosis.  Spoke with patient at length regarding pain.  Advised pain was more likely related to her metastatic colon cancer and her liver mets.  She is only taking advil PM for pain.  She has ultram and norco at home for pain, but has not been taking it.  She does not like that the pain medication make her sleepy.  She is having difficulty sleeping at night due to the pain and has starting taking a sleeping pill prescribed by PCP and it is causing her to be sleepy during the day.  We had a raji conversation regarding the status of her disease and need for pain medication.  Instructed patient to start with the ultram during the day.  Advised to stop the sleeping pill and try the norco at night to help with both pain and insomnia.  Patient verbalized understanding.

## 2020-05-14 NOTE — TELEPHONE ENCOUNTER
PT IS HAVING SOME PAIN FROM GALLSTONES WANTED TO SPEAK TO DR. HYMAN TO SEE WHAT SHE CAN DO TO TREAT THIS PAIN     PLEASE ADVISE AND GIVE CALL [phone] (H)

## 2020-05-15 NOTE — TELEPHONE ENCOUNTER
PT JUST WANTED DR HYMAN TO KNOW THAT SHE HAS BEEN NAUSEOUS AND VOMITING TODAY SO FAR 3 TIMES    PT CONTACT # [phone]

## 2020-05-15 NOTE — TELEPHONE ENCOUNTER
Spoke with patient. She has had one dose of zofran.  Rx for compazine was sent in March with 2 refills.  Patient does not think she ever picked it up.  Advised her to contact her pharmacy for them to get a fill ready for her to  and have on hand in case zofran was not effective.  Advised patient to let us know if compazine does not help and Itzel would  try dexamethasone.  Verbalized understanding

## 2020-05-19 NOTE — TELEPHONE ENCOUNTER
Pt is calling states her urine is turning darker in color started 4 days ago. She states there is no pain but wanted Dr. Menchaca to know the color has changed. States she drink 4-5 cups of water daily     Please advise and give call [phone] (H)

## 2020-05-19 NOTE — TELEPHONE ENCOUNTER
Discussed with Dr. Menchaca.  He wants patient to have CBC, CMP, and UA tomorrow.  Patient notified and verbalized understanding.  Advised I will have Carlos Eduardo call her in the morning with an appointment time for labs.

## 2020-05-26 NOTE — TELEPHONE ENCOUNTER
PT HAS 1 DAY LEFT OF MEDICATION.    PLEASE GIVE PT'S  A CALL ONCE SENT TO PHARMACY.    CHRISTOPHER   [phone]

## 2020-05-28 NOTE — PROGRESS NOTES
CHIEF COMPLAINT: 1.  Nausea  2.  Fatigue  3.  BRAF mutated metastatic colon cancer    Problem List:  Oncology/Hematology History    1.  Stage IIIB [id number] moderately differentiated colon cancer  2.   History of breast cancer 2003 treated with chemotherapy and radiation with Dr. Rivers  3.   History of cervical cancer treated with radiation in 1993    -3/25/2019 screening colonoscopy found 5.5 cm tumor at 50 cm near the splenic fracture which was tattooed.  This showed moderately differentiated adenocarcinoma and there was a separate hyperplastic polyp 5 mm at 15 cm.  -3/27/2019 CT chest abdomen pelvis showed tumor at the splenic flexure T3 with transmural invasion and no distant metastases.  Positive gallstones and hiatal hernia.  CEA 2.9 with normal CMP.  -4/3/2019 transverse colectomy to the splenic flexure with primary re-anastomosis revealed moderately differentiated adenocarcinoma extending to the serosa/mesenteric fat with 8 out of 12 lymph nodes involved the largest being 2 cm.  Margins negative.  T3N2B stage IIIb  -4/30/2019 initial medical oncology visit: I reviewed her history.  She has 3 first-degree relatives with breast cancer, thyroid cancer in her family, prostate cancer in her first-degree relatives, and has had 2 other malignancies prior to her colon cancer as outlined above.  I have spoken with Dr. Freddy Esquivel to get mismatch repair testing.  She has seen Dr. Adam Royal who, because of the extent of this next to the spleen feels that localized radiation may be reasonable while not standard.  -5/14/2019 office visit: PCR showed she was mismatch repair proficient; therefore, consider Xeloda radiation followed by 6 months of Xeloda oxaliplatin.  Coordinated this with Dr. Royal who will start radiation May 27.  Following that we will start Xeloda oxaliplatin.  She will take 3 tablets of Xeloda twice a day Monday through Friday with radiation.  She will get a chemotherapy education appointment  with our pharmacy doctorate's in [place].  Though mismatch repair proficient, we will still have her see genetic counseling    -6/5/2019 genetic counseling negative    -Began radiation along with Xeloda 6/4/19 and completed 7/15/19    -7/22/2019 CT chest abdomen and pelvis showed progression of disease with extensive peritoneal carcinomatosis    -7/30/2019 office visit: I reviewed the results and images of her CT chest abdomen pelvis showing peritoneal carcinomatosis.  July 9 CEA elevated at 8.4.  Discussed with patient the plan to continue with the Xeloda oxaliplatin but to add Avastin.  Spoken with surgical oncologist at [facility] during a meeting who performs HIPEC.  He suggested primary chemotherapy and if get a response then send to surgeon accordingly.  I plan to give her Avastin Xeloda oxaliplatin and send strata as this is acting like a BRAF mutated cancer.  If that turns out to be the case, I will switch her to cetuximab, Encorafenib, and Binimetinib.  If we get a peritoneal response without distant metastatic visceral disease of the liver or lung appearing, then I will get her to Dr. Sims at the [facility] for consideration of peritoneal debulking with PCI scoring and possible HI PEC.  She understands the palliative nature of our attempts at this junction.    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  Microsatellite stable.  TMB low.  PD-L1 low.  Potential relevant therapies include Binimetinib + Encorafenib, see Strata report for full list.     -10/28/2019CT chest abdomen pelvis shows previous granulomatous disease with some emphysematous changes, small left pleural effusion with atelectasis or pneumonitis with decrease in size compared to prior CT.  1.3 cm hepatic dome lesion likely related to serosal implant with mild perihepatic ascites.  Has perisplenic carcinomatosis with additional peritoneal carcinomatosis.  Compared to July the  peritoneal carcinomatosis has progressed diffusely including down into the deep pelvis with numerous implants demonstrated.    -10/29/2019 [facility] medical oncology follow-up visit: I reviewed the images and reports of the CT scan above compared to July where there is clear-cut progression.  CEA had come down from 14.5 at the peak end of August to 4.9 as of 10/9/2019, clearly there is peritoneal progression on CT and she has poorly tolerated the Xeloda oxaliplatin Avastin.  Hence we will, based on that B BRAF V600 E mutation, place her on cetuximab, Binimetinib, and Encorafenib which is the most active regimen for this particular mutation and colorectal cancers.  We will check her ejection fraction and have her see my nurse practitioner back in a week to make sure that is doing well and start her into on this regimen and will have my pharmacy staff educate her on this.  Went over the side effects including rashes and diarrhea.  She is currently having diarrhea from the Xeloda and I will is on Lomotil and Imodium which hopefully will subside over the next week.    -11/1/2019 echocardiogram with normal left ventricular systolic function calculated EF 59%.    -1/31/2020 CT chest abdomen pelvis with contrast at [facility] shows multiple new pleural-based densities left lung concerning for pleural metastatic disease as well as increasing size of subcentimeter left lower lobe nodule.  Mild progression of carcinomatosis predominantly in the omentum compared to 10/24/1999 with a new mild amount of abnormal soft tissue density in the gastrohepatic ligament.  Hemoglobin 10 with platelets 554,000 normal white count.  CMP unremarkable.  Having increasing abdominal pains.  We will switch to Avastin FOLFIRI.     -4/14/2020 [facility] medical oncology visit: CT chest abdomen and pelvis shows progression of hepatic and omental metastatic disease.  We will change therapy at this time to Regorafinib.  I will start with 3 tablets  daily 21 out of 28 days and if tolerated increase to full dose of 4 tablets daily 21 out of 28 days.  CT also showed multiple gallstones and mild biliary dilatation, she is having some nausea and vomiting after eating certain foods.  We will have to monitor closely, may need surgical evaluation for gallbladder disease.    -4/30/2020 emergency room visit for intractable nausea and vomiting.  CT showed large hiatal hernia without gastric outlet obstruction or small bowel obstruction.  Metastatic lesion in the liver with gallstones and mild intrahepatic biliary ductal dilation without bowel obstruction but multiple peritoneal implants.          Overlapping malignant neoplasm of colon (CMS/HCC)    4/30/2019 Initial Diagnosis     Overlapping malignant neoplasm of colon (CMS/HCC)      6/4/2019 - 7/9/2019 Chemotherapy/Radiation     OP COLON Capecitabine 825 mg/m2 M-F            6/5/2019 Genetic Testing      Genetic testing was negative for mutations in BRCA1/2 and all 32 additional genes on the CancerNext Panel.           7/22/2019 Progression     CT chest abdomen and pelvis revealed rather extensive diffuse peritoneal carcinomatosis, at least 15-20 peritoneal nodules measuring between 1 cm in many as large as 2-3 cm scattered throughout the peritoneal cavity, significantly progressed from earlier this year.  No metastasis in the chest.  Small left pleural effusion with associated mild left bibasilar atelectasis, otherwise clear lungs.      8/8/2019 - 10/21/2019 Chemotherapy     OP COLON CapeOX+ Bevacizumab (Capecitabine / OXALIplatin / Bevacizumab)        10/28/2019 Progression     Compared to July the peritoneal carcinomatosis has progressed diffusely including down into the deep pelvis with numerous implants demonstrated.        10/28/2019 Imaging     CT chest abdomen pelvis shows previous granulomatous disease with some emphysematous changes, small left pleural effusion with atelectasis or pneumonitis with decrease in  size compared to prior CT.  1.3 cm hepatic dome lesion likely related to serosal implant with mild perihepatic ascites.  Has perisplenic carcinomatosis with additional peritoneal carcinomatosis.  Compared to July the peritoneal carcinomatosis has progressed diffusely including down into the deep pelvis with numerous implants demonstrated.      11/1/2019 -  Other Event     Echocardiogram  · Left ventricular systolic function is normal.  · Calculated EF = 59.0%. Normal Global Longitudinal LV strain = -20.5%.  · Left ventricular diastolic dysfunction (grade II) consistent with pseudonormalization.  · Left atrial cavity size is mildly dilated.      11/14/2019 - 2/5/2020 Chemotherapy     OP COLORECTAL Cetuximab / Binimetinib / Encorafenib         12/11/2019 -  Other Event     Echocardiogram  · Left ventricular systolic function is normal.  · Calculated EF = 72.0%  · Normal global longitudinal strain.      1/31/2020 Progression     CT chest abdomen pelvis shows new pleural metastases, increasing left lower lobe lung nodule, increasing carcinomatosis, and a new gastrohepatic ligament lymph node.        2/18/2020 - 4/13/2020 Chemotherapy     OP COLORECTAL FOLFIRI + Bevacizumab-awwb (Irinotecan / Leucovorin / Fluorouracil / Bevacizumab)      4/8/2020 Progression     CT chest, abdomen and pelvis show progression of hepatic and omental metastatic disease.  There is a new 6 mm low-attenuation lesion along the posterior segment of the right hepatic lobe.  Previously described subcentimeter lesion along the posterior segment of the right hepatic lobe now measures 9 mm.  Multiple gallstones, development of mild biliary dilatation primarily along the left hepatic lobe.  Diffuse omental metastasis appears worse compared to prior study with approximate thickness of 1.8 cm previously 1.2 cm.      4/28/2020 - 4/28/2020 Chemotherapy     OP COLON Regorafenib      5/5/2020 Adverse Reaction     Hypertension and general debilitation on  Regorafinib.      5/14/2020 -  Chemotherapy     OP COLON Trifluidine / Tipiracil         HISTORY OF PRESENT ILLNESS:  The patient is a 64 y.o. female, here earlier than her previously scheduled follow-up appointment due to worsening nausea and fatigue.  She has BRAF mutated colon cancer metastatic to peritoneum.  Is currently on lonsurf and states she has 2 days left in the cycle.  States that she is not able to sleep well, when she lies down she is uncomfortable.  Feels that she is eating a fair amount, has diarrhea.  She feels nauseated most of the time, occasionally has vomiting with bile.  Since we saw her last, on labs drawn 5/20/2020 bilirubin was noted to be up from previous normal to 4.8, alkaline phosphatase was up at 1123, liver function studies also elevated with AST of 198 and ALT of 304, these were normal on 4/30/2020.  She is taking pain medication regularly every 6 hours and feels her pain is fairly well controlled.    Past Medical History:   Diagnosis Date   • Anemia    • Cancer (CMS/HCC)    • Disease of thyroid gland    • GERD (gastroesophageal reflux disease)    • Hypertension      Past Surgical History:   Procedure Laterality Date   • BREAST LUMPECTOMY Left 2003     [place]   • COLON SURGERY  2019    [facility] Dr. [facility]       Allergies   Allergen Reactions   • Penicillins Nausea And Vomiting       Family History and Social History reviewed and changed as necessary      REVIEW OF SYSTEM:   Review of Systems   Constitutional: Positive for fatigue  HENT:   Negative for mouth sores, sore throat and trouble swallowing.    Eyes: Positive for icterus.   Respiratory: Negative for cough, hemoptysis and shortness of breath.    Cardiovascular: Negative for chest pain, leg swelling and palpitations.   Gastrointestinal: Positive for diarrhea, positive for intermittent abdominal pain, positive for nausea and occasional vomiting.   Endocrine: Negative for hot flashes.   Genitourinary: Negative for bladder  "incontinence, difficulty urinating, dysuria, frequency and hematuria.    Musculoskeletal: Negative for gait problem, neck pain and neck stiffness.   Skin: Negative for rash.   Neurological: Negative for dizziness, gait problem, headaches, light-headedness and numbness.   Hematological: Negative for adenopathy. Does not bruise/bleed easily.   Psychiatric/Behavioral: Negative for depression. The patient is not nervous/anxious.    All other systems reviewed and are negative.       PHYSICAL EXAM    Vitals:    05/28/20 1138   BP: 137/71   Pulse: 102   Resp: 22   Temp: 99 °F (37.2 °C)   SpO2: 97%   Weight: 62.6 kg (138 lb)   Height: 167.6 cm (66\")     Vitals:    05/28/20 1138   PainSc:   8   PainLoc: Back  Comment: LOWER BACK        Constitutional: Frail-appearing, jaundiced female here with her , fatigued but no distress.  ECOG: 3  HENT:   Head: Normocephalic.   Mouth/Throat: Oropharynx is clear and moist.   Eyes: Positive scleral icterus.   Neck: Neck supple. No JVD present.   Pulmonary/Chest: Breath sounds normal. No respiratory distress.   Abdominal: Soft. Exhibits no distension. There is no tenderness.   Musculoskeletal:Exhibits no edema, tenderness or deformity.   Neurological: Alert and oriented to person, place, and time. Exhibits normal muscle tone.   Skin: No ecchymosis, no petechiae and no rash noted. Not diaphoretic. No cyanosis.  Positive jaundiced appearing.   Psychiatric: Normal mood and affect.   Vitals reviewed.  Labs reviewed.    Lab Results   Component Value Date    HGB 10.5 (L) 05/20/2020    HCT 33.4 (L) 05/20/2020    MCV 85 05/20/2020     (H) 05/20/2020    WBC 7.3 05/20/2020    NEUTROABS 5.1 05/20/2020    LYMPHSABS 0.7 05/20/2020    MONOSABS 0.8 05/20/2020    EOSABS 0.7 (H) 05/20/2020    BASOSABS 0.1 05/20/2020       Lab Results   Component Value Date    GLUCOSE 81 04/30/2020    BUN 10 05/20/2020    CREATININE 0.49 (L) 05/20/2020     05/20/2020    K 3.8 05/20/2020    CL 93 (L) " 05/20/2020    CO2 22 05/20/2020    CALCIUM 9.0 05/20/2020    PROTEINTOT 7.9 04/30/2020    ALBUMIN 3.4 (L) 05/20/2020    BILITOT 4.8 (H) 05/20/2020    ALKPHOS 1,123 (H) 05/20/2020     (H) 05/20/2020     (H) 05/20/2020           ASSESSMENT & PLAN:    1. Metastatic colon cancer with peritoneal and liver metastasis  2.   History of breast cancer 2003 treated with chemotherapy and radiation with Dr. Rivers  3.   History of cervical cancer treated with radiation in 1993  4.   Hyperbilirubinemia with jaundice  5.   Elevated liver enzymes    Discussion: She is not going to tolerate lonsurf, and it does not seem to be keeping her disease in check as clinically she is deteriorating and labs from 5/20/2020 as shown above show worsening liver function and now jaundiced with hyperbilirubinemia.  Unfortunately we are out of treatment options, she has significant peritoneal carcinomatosis causing most of her early satiety and nausea.  I discussed with the patient and her  that I recommend stopping lonsurf at this time.  I discussed the terminal nature of her disease and recommended hospice referral for care at home.  Hopefully they can get her symptoms under better control, if not then would consider admission to the hospice unit at [facility] for symptom management.  The patient and her  state understanding and agreement with hospice referral.  We will hopefully be able to get them to come and visit her today.  I also notified Dr. Zhang Menchaca via telephone of patient's condition and hospice referral.  I will make her follow-up as needed but she does understand we are available for ongoing care but if she is comfortable at home I would not want her having to come out for appointments.    I spent a total of 25 minutes in direct patient care, greater than 16  minutes face to face, time was spent in coordination of care, and counseling the patient regarding terminal nature of her metastatic cancer,  symptom management and plan for hospice care.  Answered any questions patient had regarding medications and plan of care.     Itzel Curran, APRN    05/28/2020

## 2020-07-29 ENCOUNTER — TELEPHONE (OUTPATIENT)
Dept: ONCOLOGY | Facility: CLINIC | Age: 65
End: 2020-07-29